# Patient Record
Sex: MALE | Race: WHITE | NOT HISPANIC OR LATINO | Employment: UNEMPLOYED | URBAN - METROPOLITAN AREA
[De-identification: names, ages, dates, MRNs, and addresses within clinical notes are randomized per-mention and may not be internally consistent; named-entity substitution may affect disease eponyms.]

---

## 2018-10-16 ENCOUNTER — HOSPITAL ENCOUNTER (EMERGENCY)
Facility: CLINIC | Age: 38
Discharge: HOME | End: 2018-10-16
Attending: EMERGENCY MEDICINE | Admitting: EMERGENCY MEDICINE
Payer: MEDICAID

## 2018-10-16 VITALS
SYSTOLIC BLOOD PRESSURE: 124 MMHG | BODY MASS INDEX: 27.01 KG/M2 | RESPIRATION RATE: 16 BRPM | HEART RATE: 90 BPM | TEMPERATURE: 97.6 F | WEIGHT: 195 LBS | DIASTOLIC BLOOD PRESSURE: 80 MMHG | OXYGEN SATURATION: 99 %

## 2018-10-16 DIAGNOSIS — F41.9 ANXIETY: ICD-10-CM

## 2018-10-16 PROCEDURE — 93005 ELECTROCARDIOGRAM TRACING: CPT

## 2018-10-16 PROCEDURE — 90791 PSYCH DIAGNOSTIC EVALUATION: CPT

## 2018-10-16 PROCEDURE — 99284 EMERGENCY DEPT VISIT MOD MDM: CPT | Mod: 25 | Performed by: EMERGENCY MEDICINE

## 2018-10-16 PROCEDURE — 93010 ELECTROCARDIOGRAM REPORT: CPT | Mod: Z6 | Performed by: EMERGENCY MEDICINE

## 2018-10-16 PROCEDURE — 99285 EMERGENCY DEPT VISIT HI MDM: CPT | Mod: 25

## 2018-10-16 RX ORDER — ZOLPIDEM TARTRATE 10 MG/1
10 TABLET ORAL
COMMUNITY
Start: 2018-08-28 | End: 2019-04-23

## 2018-10-16 RX ORDER — DULOXETIN HYDROCHLORIDE 20 MG/1
20 CAPSULE, DELAYED RELEASE ORAL 2 TIMES DAILY
COMMUNITY
Start: 2018-06-15 | End: 2019-04-23

## 2018-10-16 RX ORDER — QUETIAPINE FUMARATE 100 MG/1
100 TABLET, FILM COATED ORAL AT BEDTIME
COMMUNITY
Start: 2018-06-15 | End: 2019-04-23

## 2018-10-16 NOTE — ED TRIAGE NOTES
Pt states he's homeless. He's been staying with a friend and they had a falling out now pt states he doesn't have any place to stay. Pt states if he lies still and closes his eyes, his dizziness is gone.

## 2018-10-16 NOTE — ED PROVIDER NOTES
History     Chief Complaint   Patient presents with     Dizziness     headache, dizziness, anxiety. BG 82, NSR,      HPI  Edgar Williamson is a 38 year old male who presents for anxiety and dizziness.  History obtained through the patient, EMS, and review of the chart.  The patient says that he is very anxious, he was staying with a woman he met through Alcoholics Anonymous but she kicked him out today.  He says he took his sleeping medications while he was watching the friend's son and the friend's son could not wake him up and so EMS was called and he was brought to the hospital where he was cleared and discharged.  He says he does not know what to do and that she has some of his things such as his medication.  He says he feels very anxious and this is manifested by racing thoughts and dizziness.  He says he has not taken anything for this today.  He denies any chest pain, difficulty breathing, abdominal pain, fever, chills, headache (he did have a headache earlier today that he describes as a mild but says it is gone now, currently rates his pain is 0 out of 10), nausea, vomiting, diarrhea, dysuria, rash.  EMS report his blood glucose was 82.    Review of his outside records shows he was treated in the emergency department at [facility] last night and stayed all night there for presumed overdose of diazepam and gabapentin.  Per the records from [facility] the patient had a presumed overdose of diazepam and gabapentin as there were allegedly missing bottles of this from this other person's house.  He was watched in the [facility] emergency department for multiple hours and was very sleepy and hard to arouse but did not require any further intervention.. He was discharged this morning in good health.  He denies any of this and says he just took his normal sleeping medication of zolpidem.  At that facility had a normal electrolyte panel, normal LFTs, negative salicylate level, and negative  acetaminophen level.    Problem List:    Patient Active Problem List    Diagnosis Date Noted     Generalized anxiety disorder 09/28/2015     Priority: Medium     Diagnosis updated by automated process. Provider to review and confirm.       Moderate major depression (H) 01/17/2011     Priority: Medium     CARDIOVASCULAR SCREENING; LDL GOAL LESS THAN 160 10/31/2010     Priority: Medium        Past Medical History:    No past medical history on file.    Past Surgical History:    No past surgical history on file.    Family History:    Family History   Problem Relation Age of Onset     Cancer Mother      Alcohol/Drug Father      Psychotic Disorder Father      anxiety     Asthma Maternal Grandmother      Psychotic Disorder Maternal Grandmother      anxiety     HEART DISEASE Maternal Grandfather      Circulatory Maternal Grandfather      Psychotic Disorder Maternal Grandfather      anxiety, depression       Social History:  Marital Status:  Single [1]  Social History   Substance Use Topics     Smoking status: Current Every Day Smoker     Packs/day: 1.00     Types: Cigarettes     Smokeless tobacco: Current User     Types: Chew     Alcohol use No        Medications:      ALPRAZolam (XANAX PO)   DULoxetine (CYMBALTA) 20 MG EC capsule   QUEtiapine (SEROQUEL) 100 MG tablet   zolpidem (AMBIEN) 10 MG tablet         Review of Systems  Pertinent positives and negatives listed in the HPI, all other systems reviewed and are negative.    Physical Exam   BP: 117/80  Pulse: 71  Temp: 97.6  F (36.4  C)  Resp: 16  Weight: 88.5 kg (195 lb)  SpO2: 100 %      Physical Exam   Constitutional: He is oriented to person, place, and time. He appears well-developed and well-nourished. No distress.   HENT:   Head: Normocephalic and atraumatic.   Right Ear: External ear normal.   Left Ear: External ear normal.   Nose: Nose normal.   Eyes: Conjunctivae are normal. No scleral icterus.   Neck: Normal range of motion.   Cardiovascular: Normal rate and  regular rhythm.    Pulmonary/Chest: Effort normal. No stridor. No respiratory distress.   Abdominal: Soft. He exhibits no distension. There is no tenderness. There is no rebound and no guarding.   Neurological: He is alert and oriented to person, place, and time. No cranial nerve deficit. He exhibits normal muscle tone. Coordination and gait normal. GCS eye subscore is 4. GCS verbal subscore is 5. GCS motor subscore is 6.   No dysmetria.  No dysdiadochokinesis.   Skin: Skin is warm and dry. He is not diaphoretic.   Psychiatric: He has a normal mood and affect. His behavior is normal.   Nursing note and vitals reviewed.      ED Course     ED Course     Procedures               EKG Interpretation:      Interpreted by Jose Bernard  Time reviewed:1630   Symptoms at time of EKG: Sleepy   Rhythm: normal sinus   Rate: normal  Axis: NORMAL  Ectopy: none  Conduction: normal  ST Segments/ T Waves: No ST-T wave changes  Q Waves: none  Comparison to prior: No old EKG available    Clinical Impression: normal EKG     Critical Care time:  none               No results found for this or any previous visit (from the past 24 hour(s)).    Medications - No data to display    Assessments & Plan (with Medical Decision Making)   38-year-old male presents for anxiety.  He is very sleepy on examination but is able to answer questions appropriately and stay awake and be conversant.  Temperature is 36.4 C, blood pressure 117/80, heart 71, SPO2 is 100% on room air.  He is complaining of dizziness but he has no subjective findings on examination.  He is able to ambulate without difficulty and has no findings to suggest a central cause of vertigo.  Given the recent workup at [facility], unlikely acetaminophen or illicit overdose here.  No indication for repeat blood work.  EKG is sinus rhythm without prolonged QT, widened QRS, or tall R wave in aVR.  He is watched in the emergency department for several hours and a direct consult was  obtained.  They have helped arrange follow-up with the patient in psychology and with a counselor in 2 days.  On recheck the patient is comfortable and tolerating oral intake.  He has eaten a meal here.  He is asking for refills of his medications and I have offered to refill his duloxetine and quetiapine but he became upset with this asking for the alprazolam and zolpidem which I discussed my concerns with and encouraged him to seek chemical dependency.  He did not agree with this plan.  He was discharged from the hospital, however unfortunately he did not have any place to go or any way to get there.  We offered resources for homeless shelters and the charge nurse spoke on the phone with multiple different shelters in [place] and [place] concerning the patient in trying to arrange for him to have a place to stay tonight.  She was able to arrange for this and transfer of the patient down by taxi.    I have reviewed the nursing notes.    I have reviewed the findings, diagnosis, plan and need for follow up with the patient.       Discharge Medication List as of 10/16/2018  6:41 PM          Final diagnoses:   Anxiety       10/16/2018   [facility] EMERGENCY DEPARTMENT     Jose Bernard MD  10/16/18 8226

## 2018-10-16 NOTE — DISCHARGE INSTRUCTIONS
Follow-up with the psychologist as arranged.  Return to the emergency department if you have fevers, repeated vomiting, or other concerns.  Otherwise follow-up in clinic.

## 2018-10-16 NOTE — ED AVS SNAPSHOT
[facility] Emergency Department    [address] [address]    Phone:  [phone]    Fax:  [phone]                                       Edgar Williamson   MRN: [id number]    Department:  [facility] Emergency Department   Date of Visit:  10/16/2018           Patient Information     Date Of Birth          1980        Your diagnoses for this visit were:     Anxiety        You were seen by Jose Bernard MD.        Discharge Instructions       Follow-up with the psychologist as arranged.  Return to the emergency department if you have fevers, repeated vomiting, or other concerns.  Otherwise follow-up in clinic.    24 Hour Appointment Hotline       To make an appointment at any [facility] call [phone] ([phone]). If you don't have a family doctor or clinic, we will help you find one. [facility] are conveniently located to serve the needs of you and your family.             Review of your medicines      Our records show that you are taking the medicines listed below. If these are incorrect, please call your family doctor or clinic.        Dose / Directions Last dose taken    DULoxetine 20 MG EC capsule   Commonly known as:  CYMBALTA   Dose:  20 mg        Take 20 mg by mouth 2 times daily   Refills:  0        QUEtiapine 100 MG tablet   Commonly known as:  SEROquel   Dose:  100 mg        Take 100 mg by mouth At Bedtime   Refills:  0        XANAX PO   Dose:  0.5 mg        Take 0.5 mg by mouth 3 times daily   Refills:  0        zolpidem 10 MG tablet   Commonly known as:  AMBIEN   Dose:  10 mg        Take 10 mg by mouth nightly as needed   Refills:  0                Procedures and tests performed during your visit     EKG 12 lead      Orders Needing Specimen Collection     None      Pending Results     No orders found from 10/14/2018 to 10/17/2018.            Pending Culture Results     No orders found from 10/14/2018 to 10/17/2018.            Pending  "Results Instructions     If you had any lab results that were not finalized at the time of your Discharge, you can call the ED Lab Result RN at [phone]. You will be contacted by this team for any positive Lab results or changes in treatment. The nurses are available 7 days a week from 10A to 6:30P.  You can leave a message 24 hours per day and they will return your call.        Test Results From Your Hospital Stay               Thank you for choosing [facility]       Thank you for choosing [facility] for your care. Our goal is always to provide you with excellent care. Hearing back from our patients is one way we can continue to improve our services. Please take a few minutes to complete the written survey that you may receive in the mail after you visit with us. Thank you!        [employer] Information     [employer] lets you send messages to your doctor, view your test results, renew your prescriptions, schedule appointments and more. To sign up, go to [email or web address] . Click on \"Log in\" on the left side of the screen, which will take you to the Welcome page. Then click on \"Sign up Now\" on the right side of the page.     You will be asked to enter the access code listed below, as well as some personal information. Please follow the directions to create your username and password.     Your access code is: [email or web address]  Expires: 2019  6:41 PM     Your access code will  in 90 days. If you need help or a new code, please call your [facility] clinic or [phone].        Care EveryWhere ID     This is your Care EveryWhere ID. This could be used by other organizations to access your [facility] medical records  [id number]        Equal Access to Services     PIPE JANG : August Sanches, dennis sadler, padmini hopper. So [facility] [phone].    ATENCIÓN: Si habla español, tiene a martin disposición servicios gratuitos de asistencia " júnior Alvarezdenise al [phone].    We comply with applicable federal civil rights laws and [place] laws. We do not discriminate on the basis of race, color, national origin, age, disability, sex, sexual orientation, or gender identity.            After Visit Summary       This is your record. Keep this with you and show to your community pharmacist(s) and doctor(s) at your next visit.

## 2018-10-16 NOTE — ED AVS SNAPSHOT
[facility] Emergency Department    [address] [address]    Phone:  [phone]    Fax:  [phone]                                       Edgar Williamson   MRN: [id number]    Department:  [facility] Emergency Department   Date of Visit:  10/16/2018           After Visit Summary Signature Page     I have received my discharge instructions, and my questions have been answered. I have discussed any challenges I see with this plan with the nurse or doctor.    ..........................................................................................................................................  Patient/Patient Representative Signature      ..........................................................................................................................................  Patient Representative Print Name and Relationship to Patient    ..................................................               ................................................  Date                                   Time    ..........................................................................................................................................  Reviewed by Signature/Title    ...................................................              ..............................................  Date                                               Time          22EPIC Rev 08/18

## 2018-10-17 ENCOUNTER — HOSPITAL ENCOUNTER (EMERGENCY)
Facility: CLINIC | Age: 38
Discharge: STILL ADMITTED OR ADMINISTRATIVE | End: 2018-10-17
Payer: MEDICAID

## 2018-10-17 NOTE — ED NOTES
Attempted to discharge pt with information to homeless shelters and  chemical dependency info.  Pt refused to sign discharge paperwork and began to yell at staff and pace in room.  Directed pt to waiting room where he cont. To yell and move bags of his belongings to an area of the waiting room.  Pt pacing and agitated.  MD aware of situation.  Security present.  Police called for further assistance.

## 2019-04-23 ENCOUNTER — OFFICE VISIT (OUTPATIENT)
Dept: FAMILY MEDICINE | Facility: CLINIC | Age: 39
End: 2019-04-23
Payer: COMMERCIAL

## 2019-04-23 ENCOUNTER — ANCILLARY PROCEDURE (OUTPATIENT)
Dept: GENERAL RADIOLOGY | Facility: CLINIC | Age: 39
End: 2019-04-23
Attending: FAMILY MEDICINE
Payer: COMMERCIAL

## 2019-04-23 VITALS
HEIGHT: 72 IN | SYSTOLIC BLOOD PRESSURE: 107 MMHG | HEART RATE: 62 BPM | OXYGEN SATURATION: 99 % | WEIGHT: 198 LBS | TEMPERATURE: 97.2 F | BODY MASS INDEX: 26.82 KG/M2 | DIASTOLIC BLOOD PRESSURE: 72 MMHG

## 2019-04-23 DIAGNOSIS — M47.816 PRIMARY OSTEOARTHRITIS OF LUMBAR SPINE: ICD-10-CM

## 2019-04-23 DIAGNOSIS — D32.0 BENIGN MENINGIOMA OF BRAIN (H): ICD-10-CM

## 2019-04-23 DIAGNOSIS — S39.012A STRAIN OF LUMBAR REGION, INITIAL ENCOUNTER: ICD-10-CM

## 2019-04-23 DIAGNOSIS — F41.1 GENERALIZED ANXIETY DISORDER: ICD-10-CM

## 2019-04-23 DIAGNOSIS — Z72.0 TOBACCO ABUSE: ICD-10-CM

## 2019-04-23 DIAGNOSIS — S39.012A STRAIN OF LUMBAR REGION, INITIAL ENCOUNTER: Primary | ICD-10-CM

## 2019-04-23 PROCEDURE — 72100 X-RAY EXAM L-S SPINE 2/3 VWS: CPT

## 2019-04-23 PROCEDURE — 99214 OFFICE O/P EST MOD 30 MIN: CPT | Performed by: FAMILY MEDICINE

## 2019-04-23 RX ORDER — GABAPENTIN 100 MG/1
CAPSULE ORAL
Qty: 60 CAPSULE | Refills: 6 | Status: SHIPPED | OUTPATIENT
Start: 2019-04-23 | End: 2019-05-16 | Stop reason: ALTCHOICE

## 2019-04-23 RX ORDER — DICLOFENAC SODIUM 75 MG/1
75 TABLET, DELAYED RELEASE ORAL 2 TIMES DAILY PRN
Qty: 60 TABLET | Refills: 3 | Status: SHIPPED | OUTPATIENT
Start: 2019-04-23 | End: 2019-11-04 | Stop reason: SINTOL

## 2019-04-23 RX ORDER — ALPRAZOLAM 0.5 MG
1 TABLET ORAL 3 TIMES DAILY PRN
Qty: 30 TABLET | Refills: 0 | COMMUNITY
Start: 2019-04-23 | End: 2020-03-26 | Stop reason: DRUGHIGH

## 2019-04-23 RX ORDER — ZOLPIDEM TARTRATE 10 MG/1
10 TABLET ORAL
Qty: 30 TABLET | Refills: 0 | Status: SHIPPED | OUTPATIENT
Start: 2019-04-23 | End: 2020-03-26

## 2019-04-23 ASSESSMENT — PAIN SCALES - GENERAL: PAINLEVEL: MODERATE PAIN (4)

## 2019-04-23 ASSESSMENT — MIFFLIN-ST. JEOR: SCORE: 1841.12

## 2019-04-23 NOTE — PROGRESS NOTES
SUBJECTIVE:   Edgar Williamson is a 39 year old male who presents to clinic today for the following   health issues:  Patient with history of generalized anxeity disorder, history of insomnia.  He does see psychiatry when he obtained Ambien and Xanax.    He does come with complaints of low back pain which been on and off for years.  He reports he does see a chiropractor  twice a month.  He reported the pain mostly in the lower lumbar region more stiffness.    He denies lower extremity numbness or tingling, denies any weakness.  Denies loss of urine or stool.    Patient reports he does work as a  where he stands and leans forward most of the days.    He reports that over 20 years ago he had x-rays and he been told he had an early sign of arthritis   patient is a smoker he reports he is in the process of quitting.    Back Pain       Duration: Ongoing ( 20 years )        Specific cause: work-related    Description:   Location of pain: low back center  Character of pain: dull ache, stabbing and constant  Pain radiation:none  New numbness or weakness in legs, not attributed to pain:  no     Intensity: Currently 4/10, At its worst 7/10    History:   Pain interferes with job: YES  History of back problems: yes  Any previous MRI or X-rays: None  Sees a specialist for back pain:  chiropractor   Therapies tried without relief: Ibuprofen, cold and heat    Alleviating factors:   Improved by: muscle relaxants      Precipitating factors:  Worsened by: Lying Flat, Coughing and sneezing          Accompanying Signs & Symptoms:  Risk of Fracture:  None  Risk of Cauda Equina:  None  Risk of Infection:  None  Risk of Cancer:  None  Risk of Ankylosing Spondylitis:  Onset at age <35, male, AND morning back stiffness. no       Additional history: as documented    Reviewed  and updated as needed this visit by clinical staff  Tobacco  Allergies  Meds  Med Hx  Surg Hx  Fam Hx  Soc Hx        Reviewed and updated as needed  this visit by Provider         Patient Active Problem List   Diagnosis     CARDIOVASCULAR SCREENING; LDL GOAL LESS THAN 160     Moderate major depression (H)     Generalized anxiety disorder     Benign meningioma of brain (H)     Primary osteoarthritis of lumbar spine     Strain of lumbar region, initial encounter     History reviewed. No pertinent surgical history.    Social History     Tobacco Use     Smoking status: Current Every Day Smoker     Packs/day: 1.00     Types: Cigarettes     Smokeless tobacco: Current User     Types: Chew   Substance Use Topics     Alcohol use: No     Family History   Problem Relation Age of Onset     Cancer Mother      Alcohol/Drug Father      Psychotic Disorder Father         anxiety     Asthma Maternal Grandmother      Psychotic Disorder Maternal Grandmother         anxiety     Heart Disease Maternal Grandfather      Circulatory Maternal Grandfather      Psychotic Disorder Maternal Grandfather         anxiety, depression         Current Outpatient Medications   Medication Sig Dispense Refill     ALPRAZolam (XANAX) 0.5 MG tablet Take 1 tablet (0.5 mg) by mouth 3 times daily 30 tablet 0     diclofenac (VOLTAREN) 75 MG EC tablet Take 1 tablet (75 mg) by mouth 2 times daily as needed for moderate pain 60 tablet 3     gabapentin (NEURONTIN) 100 MG capsule One tab bid as needed 60 capsule 6     zolpidem (AMBIEN) 10 MG tablet Take 1 tablet (10 mg) by mouth nightly as needed 30 tablet 0     Allergies   Allergen Reactions     Diphenhydramine Other (See Comments)     Restless Legs/Feet  Other reaction(s): Restless Legs/Feet  Restless legs  Other reaction(s): Restless Legs/Feet       Methadone Other (See Comments)     Had terrible itching and redness and was pulled off methadone     Mirtazapine Other (See Comments)     Restless Legs/Feet  Other reaction(s): Restless Legs/Feet  Restless legs.  Other reaction(s): Restless Legs/Feet       Seasonal Allergies      Trazodone Other (See Comments) and  Unknown     Behavioral Disturbances  Other reaction(s): Behavioral Disturbances  Behavorial disturbance.  Other reaction(s): Behavioral Disturbances       No lab results found.     ROS:  Constitutional, HEENT, cardiovascular, pulmonary, gi and gu systems are negative, except as otherwise noted.    OBJECTIVE:     /72 (BP Location: Left arm, Patient Position: Chair, Cuff Size: Adult Large)   Pulse 62   Temp 97.2  F (36.2  C) (Oral)   Ht 1.829 m (6')   Wt 89.8 kg (198 lb)   SpO2 99%   BMI 26.85 kg/m    Body mass index is 26.85 kg/m .  GENERAL APPEARANCE: healthy, alert and no distress  RESP: lungs clear to auscultation - no rales, rhonchi or wheezes  CV: regular rates and rhythm  ABDOMEN: soft, nontender, without hepatosplenomegaly or masses and bowel sounds normal  SKIN: no suspicious lesions or rashes  NEURO: Normal strength and tone, mentation intact, DTR symmetrically normal in lower extremities and gait normal including heel/toe/tandem walking  Comprehensive back pain exam:  Tenderness of mid , lower lumbar region, Range of motion not limited by pain, Lower extremity strength functional and equal on both sides, Lower extremity reflexes within normal limits bilaterally, Lower extremity sensation normal and equal on both sides and Straight leg raise negative bilaterally  PSYCH: mentation appears normal    Diagnostic Test Results:  Xray - of Lumbar    ASSESSMENT/PLAN:     1. Strain of lumbar region, initial encounter  Continue with Chiropractor.   Continue active  - XR Lumbar Spine 2/3 Views; Future  - gabapentin (NEURONTIN) 100 MG capsule; One tab bid as needed  Dispense: 60 capsule; Refill: 6  - diclofenac (VOLTAREN) 75 MG EC tablet; Take 1 tablet (75 mg) by mouth 2 times daily as needed for moderate pain  Dispense: 60 tablet; Refill: 3    2. Benign meningioma of brain (H)  See neurology.    3. Generalized anxiety disorder  See Psychiatry.  - ALPRAZolam (XANAX) 0.5 MG tablet; Take 1 tablet (0.5 mg) by  mouth 3 times daily  Dispense: 30 tablet; Refill: 0  - zolpidem (AMBIEN) 10 MG tablet; Take 1 tablet (10 mg) by mouth nightly as needed  Dispense: 30 tablet; Refill: 0    4. Primary osteoarthritis of lumbar spine    - XR Lumbar Spine 2/3 Views; Future  - gabapentin (NEURONTIN) 100 MG capsule; One tab bid as needed  Dispense: 60 capsule; Refill: 6  - diclofenac (VOLTAREN) 75 MG EC tablet; Take 1 tablet (75 mg) by mouth 2 times daily as needed for moderate pain  Dispense: 60 tablet; Refill: 3    5. Tobacco abuse  Advised to quite, pt. Is cutting done  Currently, using Nicotine Lozenge.    Follow up in the next 3- 6 months for complete.    Charlee Hayes MD  [facility]

## 2019-05-14 ENCOUNTER — TELEPHONE (OUTPATIENT)
Dept: FAMILY MEDICINE | Facility: CLINIC | Age: 39
End: 2019-05-14

## 2019-05-14 DIAGNOSIS — M47.816 PRIMARY OSTEOARTHRITIS OF LUMBAR SPINE: Primary | ICD-10-CM

## 2019-05-14 NOTE — TELEPHONE ENCOUNTER
Patient called and stated he would like to increase his gabapentin (NEURONTIN) 100 MG capsule, He stated he was on a higher dose prior. Pharm [id number] - [place], [place] - [address]    Please call to discuss further  Meredith Ward  Team 3 Coordinator

## 2019-05-14 NOTE — TELEPHONE ENCOUNTER
Patient was last seen 4/23/19 by Dr. Hayes, see plan:                Instructions         Return in about 3 months (around 7/23/2019) for Physical Exam.       Attempted to call patient, only number listed is not in service.   Please get good number if he calls back.    Margaret Joshua RN  [facility]

## 2019-05-16 RX ORDER — GABAPENTIN 300 MG/1
300 CAPSULE ORAL 2 TIMES DAILY PRN
Qty: 60 CAPSULE | Refills: 6 | Status: SHIPPED | OUTPATIENT
Start: 2019-05-16 | End: 2019-09-06

## 2019-05-16 NOTE — TELEPHONE ENCOUNTER
Called number provided in second message and voicemail was for a gentleman named Giovanni. Still unable to clarify with patient at this time.     Routed to provider. Please see original message. Per chart review patient was prescribed gabapentin 300 mg capsules 1 cap every night for 1-3 days, then 1 cap BID for 1-3 days, then 1 cap TID in 2015.    Ijeoma Raymond RN

## 2019-05-16 NOTE — TELEPHONE ENCOUNTER
Attempt # 1  Called patient at home number.[phone]  Was call answered?  Answering machine states Giovanni - nurse left message am looking for Edgar Williamson and [phone]     Deepti Carrillo RN  [facility]

## 2019-05-16 NOTE — TELEPHONE ENCOUNTER
I called patient and advised him of new Gabapentin Rx sent.    Patient verbalized understanding of and agreement with plan.    Margaret Joshua RN  [facility]

## 2019-05-17 ENCOUNTER — TELEPHONE (OUTPATIENT)
Dept: FAMILY MEDICINE | Facility: CLINIC | Age: 39
End: 2019-05-17

## 2019-05-17 NOTE — TELEPHONE ENCOUNTER
Reason for Call:  Other prescription    Detailed comments: Patient requests new rx. Pt discussed provider that there may be an increase in dose for gabapentin prescription. We received a script for 100 mg TID. Thank you in advance for taking the time to review this information     Phone Number Patient can be reached at: Other phone number:  [facility] pharmacy     Best Time:     Can we leave a detailed message on this number? Not Applicable    Call taken on 5/17/2019 at 8:58 AM by Pili Marti

## 2019-05-17 NOTE — TELEPHONE ENCOUNTER
I called pharmacy, this is taken care of and patient has picked up the 300 mg dosing prescribed yesterday.    Margaret Joshua RN  [facility]

## 2019-06-11 ENCOUNTER — TELEPHONE (OUTPATIENT)
Dept: FAMILY MEDICINE | Facility: CLINIC | Age: 39
End: 2019-06-11

## 2019-06-11 NOTE — LETTER
[address] [address]  [phone]        June 11, 2019    Regarding:  Edgar Williamson  [address] [place] [id number]              To Whom It May Concern;  Patient is currently being treated for a back position and should not be climbing.  He therefore should not be in a top bunk.            Sincerely,        Charlee Hayes MD

## 2019-07-08 ENCOUNTER — TELEPHONE (OUTPATIENT)
Dept: FAMILY MEDICINE | Facility: CLINIC | Age: 39
End: 2019-07-08

## 2019-07-08 DIAGNOSIS — Z72.0 TOBACCO ABUSE: Primary | ICD-10-CM

## 2019-07-08 RX ORDER — NICOTINE 21 MG/24HR
1 PATCH, TRANSDERMAL 24 HOURS TRANSDERMAL EVERY 24 HOURS
Qty: 14 PATCH | Refills: 0 | Status: SHIPPED | OUTPATIENT
Start: 2019-07-08 | End: 2019-08-01 | Stop reason: SINTOL

## 2019-07-08 NOTE — TELEPHONE ENCOUNTER
Reason for Call:  Other prescription    Detailed comments: Patient is requesting new rx nicotine patches.    Phone Number Patient can be reached at: Other phone number:  [facility] [phone]    Best Time:     Can we leave a detailed message on this number? Not Applicable    Call taken on 7/8/2019 at 8:42 AM by Pili Marti

## 2019-07-08 NOTE — TELEPHONE ENCOUNTER
Called patient at [phone] (home).  The phone number has been disconnected.  Routed to  for letter.    Azra Gonzalez RN [facility] Triage.

## 2019-08-01 ENCOUNTER — OFFICE VISIT (OUTPATIENT)
Dept: FAMILY MEDICINE | Facility: CLINIC | Age: 39
End: 2019-08-01
Payer: COMMERCIAL

## 2019-08-01 VITALS
BODY MASS INDEX: 25.5 KG/M2 | TEMPERATURE: 97.7 F | SYSTOLIC BLOOD PRESSURE: 113 MMHG | DIASTOLIC BLOOD PRESSURE: 67 MMHG | WEIGHT: 188 LBS | HEART RATE: 64 BPM

## 2019-08-01 DIAGNOSIS — F17.200 SMOKER: Primary | ICD-10-CM

## 2019-08-01 DIAGNOSIS — M54.50 CHRONIC MIDLINE LOW BACK PAIN WITHOUT SCIATICA: ICD-10-CM

## 2019-08-01 DIAGNOSIS — G89.29 CHRONIC MIDLINE LOW BACK PAIN WITHOUT SCIATICA: ICD-10-CM

## 2019-08-01 PROCEDURE — 99213 OFFICE O/P EST LOW 20 MIN: CPT | Performed by: FAMILY MEDICINE

## 2019-08-01 ASSESSMENT — ANXIETY QUESTIONNAIRES
6. BECOMING EASILY ANNOYED OR IRRITABLE: MORE THAN HALF THE DAYS
5. BEING SO RESTLESS THAT IT IS HARD TO SIT STILL: MORE THAN HALF THE DAYS
IF YOU CHECKED OFF ANY PROBLEMS ON THIS QUESTIONNAIRE, HOW DIFFICULT HAVE THESE PROBLEMS MADE IT FOR YOU TO DO YOUR WORK, TAKE CARE OF THINGS AT HOME, OR GET ALONG WITH OTHER PEOPLE: VERY DIFFICULT
2. NOT BEING ABLE TO STOP OR CONTROL WORRYING: MORE THAN HALF THE DAYS
1. FEELING NERVOUS, ANXIOUS, OR ON EDGE: MORE THAN HALF THE DAYS
GAD7 TOTAL SCORE: 13
7. FEELING AFRAID AS IF SOMETHING AWFUL MIGHT HAPPEN: SEVERAL DAYS
3. WORRYING TOO MUCH ABOUT DIFFERENT THINGS: MORE THAN HALF THE DAYS

## 2019-08-01 ASSESSMENT — PATIENT HEALTH QUESTIONNAIRE - PHQ9
5. POOR APPETITE OR OVEREATING: MORE THAN HALF THE DAYS
SUM OF ALL RESPONSES TO PHQ QUESTIONS 1-9: 10

## 2019-08-01 NOTE — PROGRESS NOTES
Subjective     Edgar Williamson is a 39 year old male who presents to clinic today for the following health issues:    HPI     Referral to a pain clinic for his back pain    Requesting an Rx for Nicotine Longes    Medication Followup of Voltaren, Gabapentin    Taking Medication as prescribed: yes    Side Effects:  None    Medication Helping Symptoms:  Voltaren - NO                                                         Gabapentin - Yes     Patient used to abuse alcohol   He quit 3.5 years ago     He has aching back for years   He was told he has osteoarthritis       O: /67 (BP Location: Left arm, Patient Position: Sitting, Cuff Size: Adult Regular)   Pulse 64   Temp 97.7  F (36.5  C) (Oral)   Wt 85.3 kg (188 lb)   BMI 25.50 kg/m        I reviewed his xray with him and he does not seem ot have arthritis   He states his pain is around the spine, but he has no pain to percussion over the spine     SLR is normal bilaterally   Gait is normal   Posture is fair   Weight overall is good     Muscle tone seems fair       ICD-10-CM    1. Smoker F17.200 nicotine (NICORETTE) 4 MG lozenge   2. Chronic midline low back pain without sciatica M54.5 PAIN MANAGEMENT REFERRAL    G89.29      I believe his back pain is mostly muscular and feel a good back program would help him the most.  Patient would like to see if the pain clinic can offer him alternatives to taking pain medications

## 2019-08-02 ENCOUNTER — TELEPHONE (OUTPATIENT)
Dept: PALLIATIVE MEDICINE | Facility: CLINIC | Age: 39
End: 2019-08-02

## 2019-08-02 ASSESSMENT — ANXIETY QUESTIONNAIRES: GAD7 TOTAL SCORE: 13

## 2019-08-02 NOTE — TELEPHONE ENCOUNTER
Phoned pt to schedule New Eval, number disconnected, sent letter.      Dorothy EASON    [facility]

## 2019-08-02 NOTE — LETTER
August 2, 2019    Edgar Williamson  [address] [address]             You have been referred to [facility]. We have been unable to contact you by telephone to schedule your appointment. Please call our clinic at [phone] to schedule this appointment.       Sincerely,        [facility]

## 2019-09-05 ENCOUNTER — TELEPHONE (OUTPATIENT)
Dept: FAMILY MEDICINE | Facility: CLINIC | Age: 39
End: 2019-09-05

## 2019-09-05 DIAGNOSIS — M47.816 PRIMARY OSTEOARTHRITIS OF LUMBAR SPINE: ICD-10-CM

## 2019-09-05 DIAGNOSIS — F17.200 SMOKER: ICD-10-CM

## 2019-09-05 NOTE — TELEPHONE ENCOUNTER
He should have refills available at [address] for the gabapentin, appears the nicotine gum was sent to [facility] pharmacy.    I called [facility] who reports the gabapentin Rx is at the [address] [facility].   I will need contact that pharmacy to discuss patient possibly getting early refill of gabapentin.    On hold over 5 minutes.  Other calls to be done so gave up at 6 1/2 minutes, will try again later.  Margaret Joshua RN  [facility]

## 2019-09-05 NOTE — TELEPHONE ENCOUNTER
"I called [facility] again.    On hold for 8 minutes while I worked on refills.    Gave up.   Will try again later.    I also called the  pharmacy, they have refills of the nicotine lozenges which could be filled now (it \"ran\" through insurance okay) or could be transferred out.    Attempted to call patient at home number, left message on voicemail; patient was instructed to return call to [facility] RN directly on the RN call back line at [phone]     Plan to advise him to call [facility] [address] [facility] or have [address] transfer his gabapentin Rx back and let him know about the nicotine lozenges.  Margaret Joshua, RN  [facility]          "
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
## 2019-09-06 RX ORDER — GABAPENTIN 300 MG/1
300 CAPSULE ORAL 2 TIMES DAILY PRN
Qty: 60 CAPSULE | Refills: 6 | Status: SHIPPED | OUTPATIENT
Start: 2019-09-06 | End: 2019-10-15

## 2019-09-06 NOTE — TELEPHONE ENCOUNTER
I have signed the orders for his refills of gabapentin and nicotine lozenges.     (Appears on  the patient is  his medications once a month regularly.  Does not look like he is getting them from any other sources and that it consistently has been refilled at that time frame without getting them early is normal.)

## 2019-09-06 NOTE — TELEPHONE ENCOUNTER
Routed to PCP to see if OK for early refills.    See message below regarding med and police.    Azra Gonzalez RN [facility] Triage.

## 2019-10-03 PROBLEM — R41.82 ALTERED MENTAL STATUS, UNSPECIFIED: Status: ACTIVE | Noted: 2017-03-02

## 2019-10-03 PROBLEM — I87.2 STASIS DERMATITIS OF BOTH LEGS: Status: ACTIVE | Noted: 2017-12-22

## 2019-10-03 PROBLEM — S01.111A: Status: ACTIVE | Noted: 2017-03-02

## 2019-10-03 PROBLEM — H02.052 TRICHIASIS OF RIGHT LOWER EYELID: Status: ACTIVE | Noted: 2017-08-03

## 2019-10-03 PROBLEM — F10.929 ALCOHOL INTOXICATION, WITH UNSPECIFIED COMPLICATION (H): Status: ACTIVE | Noted: 2017-03-02

## 2019-10-15 ENCOUNTER — TELEPHONE (OUTPATIENT)
Dept: FAMILY MEDICINE | Facility: CLINIC | Age: 39
End: 2019-10-15

## 2019-10-15 DIAGNOSIS — M47.816 PRIMARY OSTEOARTHRITIS OF LUMBAR SPINE: ICD-10-CM

## 2019-10-15 RX ORDER — GABAPENTIN 300 MG/1
300 CAPSULE ORAL 3 TIMES DAILY
Qty: 90 CAPSULE | Refills: 0 | Status: SHIPPED | OUTPATIENT
Start: 2019-10-15 | End: 2019-11-12

## 2019-10-15 NOTE — TELEPHONE ENCOUNTER
Called patient.  He is currently taking Gabapentin 300 mg BID.  He stated that his back pain is still a 6/10 and is wondering if he can increase his dosage.    Routed to provider to advise.  Azra Gonzalez RN,BSN  [facility]

## 2019-10-15 NOTE — TELEPHONE ENCOUNTER
Reason for Call:  Other / Med question    Detailed comments: Patient called and stated he would like to receive a response as soon as possible.    Phone Number Patient can be reached at: Home number on file [phone] (home)    Best Time: ASAP    Can we leave a detailed message on this number? YES    Call taken on 10/15/2019 at 3:53 PM by Xena Linder

## 2019-10-15 NOTE — TELEPHONE ENCOUNTER
Reason for Call:  Other prescription    Detailed comments: Patient would like to discuss gabapentin and possibly increasing the medication. [facility]      Phone Number Patient can be reached at: Home number on file [phone] (home)    Best Time:     Can we leave a detailed message on this number? YES    Call taken on 10/15/2019 at 11:13 AM by Pili Marti

## 2019-10-15 NOTE — TELEPHONE ENCOUNTER
Patient never followed through on the pain referral and he needs to contact them.    I will increase his gabapentin to tid   No refills beyond this without a follow up scheduled  site checked and appropriate

## 2019-10-15 NOTE — TELEPHONE ENCOUNTER
Notified patient of the message below and faxed script to pharmacy.  Azra Gonzalez RN,BSN  [facility]

## 2019-10-16 NOTE — TELEPHONE ENCOUNTER
Script was printed yesterday  Routed to TC to please resend.    Azra Gonzalez RN,BSN  [facility]

## 2019-10-16 NOTE — TELEPHONE ENCOUNTER
Patient is at pharmacy. He states pharmacy did not receive script. Patient states he asked that script be sent to [facility] at [address] [place] [address]. Patient very upset and disconnected call.    Thank you,  Deanna TANG  [facility]  Team Shira Coordinator

## 2019-11-04 ENCOUNTER — OFFICE VISIT (OUTPATIENT)
Dept: PALLIATIVE MEDICINE | Facility: CLINIC | Age: 39
End: 2019-11-04
Payer: COMMERCIAL

## 2019-11-04 VITALS
SYSTOLIC BLOOD PRESSURE: 129 MMHG | BODY MASS INDEX: 25.23 KG/M2 | DIASTOLIC BLOOD PRESSURE: 82 MMHG | WEIGHT: 186 LBS | HEART RATE: 98 BPM | OXYGEN SATURATION: 98 %

## 2019-11-04 DIAGNOSIS — R46.89 VERBALLY ABUSIVE BEHAVIOR: ICD-10-CM

## 2019-11-04 DIAGNOSIS — Z91.89 HISTORY OF DRUG OVERDOSE: ICD-10-CM

## 2019-11-04 DIAGNOSIS — G89.29 CHRONIC BILATERAL LOW BACK PAIN WITHOUT SCIATICA: Primary | ICD-10-CM

## 2019-11-04 DIAGNOSIS — F19.10 POLYSUBSTANCE ABUSE (H): ICD-10-CM

## 2019-11-04 DIAGNOSIS — M54.50 CHRONIC BILATERAL LOW BACK PAIN WITHOUT SCIATICA: Primary | ICD-10-CM

## 2019-11-04 PROCEDURE — 99207 ZZC DOWN CODE DUE TO SUBSEQUENT EXAM: CPT | Performed by: NURSE PRACTITIONER

## 2019-11-04 PROCEDURE — 99214 OFFICE O/P EST MOD 30 MIN: CPT | Performed by: NURSE PRACTITIONER

## 2019-11-04 ASSESSMENT — PAIN SCALES - GENERAL: PAINLEVEL: SEVERE PAIN (6)

## 2019-11-04 NOTE — Clinical Note
Dr Jose, Please see my note regarding the patient's behavior in clinic.  I would recommend referral to addiction medicine if he continues to seek pain treatment.DARELL Darden, NP-[facility]
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
## 2019-11-12 DIAGNOSIS — M47.816 PRIMARY OSTEOARTHRITIS OF LUMBAR SPINE: ICD-10-CM

## 2019-11-12 RX ORDER — GABAPENTIN 300 MG/1
300 CAPSULE ORAL 3 TIMES DAILY
Qty: 90 CAPSULE | Refills: 0 | Status: SHIPPED | OUTPATIENT
Start: 2019-11-12 | End: 2019-11-21

## 2019-11-12 NOTE — TELEPHONE ENCOUNTER
Reason for Call:  Medication or medication refill:    Do you use a [facility]?  Name of the pharmacy and phone number for the current request:  [id number] - [place], [place] - [address]    Name of the medication requested: gabapentin (NEURONTIN) 300 MG capsule    Other request: patient states that he was referred to the pain clinic for refills of his gabapentin. He had that appointment and said it was a really bad experience to the point where he ended up walking out of the appointment. He is going to call his insurance to find out what other pain clinic he can go to that is outside of [place], but he is hoping that in the meantime Dr. Hayes can refill this medication for him.    Can we leave a detailed message on this number? YES    Phone number patient can be reached at: Home number on file [phone] (home)    Best Time: anytime    Call taken on 11/12/2019 at 9:06 AM by Anuj Lopez

## 2019-11-21 ENCOUNTER — OFFICE VISIT (OUTPATIENT)
Dept: FAMILY MEDICINE | Facility: CLINIC | Age: 39
End: 2019-11-21
Payer: COMMERCIAL

## 2019-11-21 ENCOUNTER — TELEPHONE (OUTPATIENT)
Dept: ADDICTION MEDICINE | Facility: CLINIC | Age: 39
End: 2019-11-21

## 2019-11-21 VITALS
TEMPERATURE: 97.6 F | BODY MASS INDEX: 24.95 KG/M2 | DIASTOLIC BLOOD PRESSURE: 66 MMHG | SYSTOLIC BLOOD PRESSURE: 107 MMHG | OXYGEN SATURATION: 98 % | HEART RATE: 96 BPM | WEIGHT: 184 LBS

## 2019-11-21 DIAGNOSIS — M47.816 PRIMARY OSTEOARTHRITIS OF LUMBAR SPINE: ICD-10-CM

## 2019-11-21 DIAGNOSIS — F32.1 MODERATE MAJOR DEPRESSION (H): Primary | ICD-10-CM

## 2019-11-21 PROCEDURE — 99213 OFFICE O/P EST LOW 20 MIN: CPT | Performed by: FAMILY MEDICINE

## 2019-11-21 RX ORDER — IBUPROFEN 800 MG/1
800 TABLET, FILM COATED ORAL EVERY 8 HOURS PRN
Qty: 180 TABLET | Refills: 6 | Status: SHIPPED | OUTPATIENT
Start: 2019-11-21 | End: 2020-12-24

## 2019-11-21 RX ORDER — GABAPENTIN 300 MG/1
300 CAPSULE ORAL 3 TIMES DAILY
Qty: 180 CAPSULE | Refills: 3 | Status: SHIPPED | OUTPATIENT
Start: 2019-11-21 | End: 2020-04-29

## 2019-11-21 RX ORDER — IBUPROFEN 200 MG
600 TABLET ORAL
COMMUNITY
Start: 2019-09-18 | End: 2019-12-23

## 2019-11-21 RX ORDER — IBUPROFEN 600 MG/1
TABLET, FILM COATED ORAL
Refills: 0 | COMMUNITY
Start: 2019-08-02 | End: 2019-12-23

## 2019-11-21 RX ORDER — IBUPROFEN 600 MG/1
TABLET, FILM COATED ORAL
Refills: 0 | Status: CANCELLED | OUTPATIENT
Start: 2019-11-21

## 2019-11-21 ASSESSMENT — PAIN SCALES - GENERAL: PAINLEVEL: SEVERE PAIN (6)

## 2019-11-21 NOTE — PROGRESS NOTES
Subjective     Edgar Williamson is a 39 year old male who presents to clinic today for the following health issues:    HPI   Chronic/Recurring Back Pain Follow Up  Patient does have chronic low back pain, secondary to lumbar osteoarthritis.  He has been having this for many years now.  His back gets very stiff especially when he stands or tries to walk.  He reports pain worse when he stands or walk.  He reports in the past he had to quit works because of his back pain and stiffness in his back his back.  He is on gabapentin, taking ibuprofen as needed.    In the past she was seen by pain management however his visit did not go well.    He reports sometimes get numbness and tingling in his lower extremity.      Where is your back pain located? (Select all that apply) low back middle    How would you describe your back pain?  dull ache and sharp    Where does your back pain spread? nowhere    Since your last clinic visit for back pain, how has your pain changed? unchanged    Does your back pain interfere with your job? YES    Since your last visit, have you tried any new treatment? No      How many servings of fruits and vegetables do you eat daily?  2-3    On average, how many sweetened beverages do you drink each day (soda, juice, sweet tea, etc)?   5  How many days per week do you miss taking your medication? 1    What makes it hard for you to take your medications?  remembering to take      Patient Active Problem List   Diagnosis     CARDIOVASCULAR SCREENING; LDL GOAL LESS THAN 160     Moderate major depression (H)     Generalized anxiety disorder     Benign meningioma of brain (H)     Primary osteoarthritis of lumbar spine     Strain of lumbar region, initial encounter     Trichiasis of right lower eyelid     Stasis dermatitis of both legs     Seasonal allergies     Poisoning by benzodiazepines, undetermined, initial encounter     Opioid abuse, in remission (H)     Nodule of right lung     Lower urinary tract  infectious disease     Leukocytosis     Kidney stone     Insomnia     Homeless single person     History of heroin abuse (H)     Eyelid laceration, right, initial encounter     Altered mental status, unspecified     Alcohol intoxication, with unspecified complication (H)     Alcohol abuse     Controlled substance agreement signed     History reviewed. No pertinent surgical history.    Social History     Tobacco Use     Smoking status: Current Every Day Smoker     Packs/day: 1.00     Types: Cigarettes     Smokeless tobacco: Current User     Types: Chew   Substance Use Topics     Alcohol use: No     Family History   Problem Relation Age of Onset     Cancer Mother      Alcohol/Drug Father      Psychotic Disorder Father         anxiety     Asthma Maternal Grandmother      Psychotic Disorder Maternal Grandmother         anxiety     Heart Disease Maternal Grandfather      Circulatory Maternal Grandfather      Psychotic Disorder Maternal Grandfather         anxiety, depression         Current Outpatient Medications   Medication Sig Dispense Refill     ALPRAZolam (XANAX) 0.5 MG tablet Take 1 tablet (0.5 mg) by mouth 3 times daily 30 tablet 0     gabapentin (NEURONTIN) 300 MG capsule Take 1 capsule (300 mg) by mouth 3 times daily 180 capsule 3     ibuprofen (ADVIL/MOTRIN) 200 MG tablet Take 600 mg by mouth       ibuprofen (ADVIL/MOTRIN) 600 MG tablet TAKE 1 TABLET (600 MG) BY MOUTH EVERY SIX HOURS AS NEEDED FOR PAIN.  0     ibuprofen (ADVIL/MOTRIN) 800 MG tablet Take 1 tablet (800 mg) by mouth every 8 hours as needed for moderate pain 180 tablet 6     nicotine (NICORETTE) 4 MG lozenge Place 1 lozenge (4 mg) inside cheek as needed for smoking cessation 168 lozenge 3     zolpidem (AMBIEN) 10 MG tablet Take 1 tablet (10 mg) by mouth nightly as needed 30 tablet 0     Allergies   Allergen Reactions     Diphenhydramine Other (See Comments)     Restless Legs/Feet  Other reaction(s): Restless Legs/Feet  Restless legs  Other  reaction(s): Restless Legs/Feet       Methadone Other (See Comments)     Had terrible itching and redness and was pulled off methadone     Mirtazapine Other (See Comments)     Restless Legs/Feet  Other reaction(s): Restless Legs/Feet  Restless legs.  Other reaction(s): Restless Legs/Feet       Seasonal Allergies      Trazodone Other (See Comments) and Unknown     Behavioral Disturbances  Other reaction(s): Behavioral Disturbances  Behavorial disturbance.  Other reaction(s): Behavioral Disturbances       No lab results found.     Reviewed and updated as needed this visit by Provider         Review of Systems   ROS COMP: Constitutional, HEENT, cardiovascular, pulmonary, gi and gu systems are negative, except as otherwise noted.      Objective    There were no vitals taken for this visit.  There is no height or weight on file to calculate BMI.  Physical Exam   GENERAL: healthy, alert and no distress  MS: no gross musculoskeletal defects noted, no edema  Comprehensive back pain exam:  Tenderness of lower lumbar area, mid lumbar, Range of motion not limited by pain, Lower extremity strength functional and equal on both sides, Lower extremity reflexes within normal limits bilaterally, Lower extremity sensation normal and equal on both sides and Straight leg raise negative bilaterally  PSYCH: mentation appears normal, affect normal/bright    Diagnostic Test Results:  Labs reviewed in Epic        Assessment & Plan       ICD-10-CM    1. Moderate major depression (H) F32.1    2. Primary osteoarthritis of lumbar spine M47.816 ADDICTION MEDICINE REFERRAL     gabapentin (NEURONTIN) 300 MG capsule     ibuprofen (ADVIL/MOTRIN) 800 MG tablet   I did renew his prescription.  He is to continue with gabapentin, ibuprofen as needed every 8 hours.  In addition, he was referred to addiction medicine for possible to start on Suboxone.     Tobacco Cessation:   reports that he has been smoking cigarettes. He has been smoking about 1.00 pack  per day. His smokeless tobacco use includes chew.  Tobacco Cessation Action Plan: Self help information given to patient        There are no Patient Instructions on file for this visit.    Return in about 6 months (around 5/21/2020) for Physical Exam.    Charlee Hayes MD  [facility]

## 2019-11-25 NOTE — TELEPHONE ENCOUNTER
Please schedule appointment for patient with   Addiction Medicine Provider   For possible suboxone

## 2019-11-25 NOTE — TELEPHONE ENCOUNTER
Pt is scheduled with Linda Benoit on 11/29/19 @ 9:30 @ [facility] . Closing encounter as no further follow up is needed.     Anna Calderon  [facility]

## 2019-12-13 ENCOUNTER — OFFICE VISIT (OUTPATIENT)
Dept: ADDICTION MEDICINE | Facility: CLINIC | Age: 39
End: 2019-12-13
Payer: COMMERCIAL

## 2019-12-13 VITALS
TEMPERATURE: 97.9 F | WEIGHT: 196 LBS | OXYGEN SATURATION: 100 % | RESPIRATION RATE: 14 BRPM | BODY MASS INDEX: 26.58 KG/M2 | HEART RATE: 80 BPM

## 2019-12-13 DIAGNOSIS — G47.09 INITIAL INSOMNIA: ICD-10-CM

## 2019-12-13 DIAGNOSIS — F10.21 ALCOHOL USE DISORDER, SEVERE, IN SUSTAINED REMISSION (H): ICD-10-CM

## 2019-12-13 DIAGNOSIS — F17.200 TOBACCO USE DISORDER: ICD-10-CM

## 2019-12-13 DIAGNOSIS — F11.20 OPIOID USE DISORDER, SEVERE, DEPENDENCE (H): Primary | ICD-10-CM

## 2019-12-13 DIAGNOSIS — Z79.899 HIGH RISK MEDICATION USE: ICD-10-CM

## 2019-12-13 DIAGNOSIS — F41.1 GENERALIZED ANXIETY DISORDER: ICD-10-CM

## 2019-12-13 DIAGNOSIS — F32.1 MODERATE MAJOR DEPRESSION (H): ICD-10-CM

## 2019-12-13 LAB
AMPHETAMINES UR QL: ABNORMAL NG/ML
BARBITURATES UR QL SCN: NOT DETECTED NG/ML
BENZODIAZ UR QL SCN: NOT DETECTED NG/ML
BUPRENORPHINE UR QL: NOT DETECTED NG/ML
CANNABINOIDS UR QL: NOT DETECTED NG/ML
COCAINE UR QL SCN: NOT DETECTED NG/ML
D-METHAMPHET UR QL: ABNORMAL NG/ML
METHADONE UR QL SCN: NOT DETECTED NG/ML
OPIATES UR QL SCN: NOT DETECTED NG/ML
OXYCODONE UR QL SCN: NOT DETECTED NG/ML
PCP UR QL SCN: NOT DETECTED NG/ML
PROPOXYPH UR QL: NOT DETECTED NG/ML
TRICYCLICS UR QL SCN: NOT DETECTED NG/ML

## 2019-12-13 PROCEDURE — 80306 DRUG TEST PRSMV INSTRMNT: CPT | Performed by: NURSE PRACTITIONER

## 2019-12-13 PROCEDURE — 99205 OFFICE O/P NEW HI 60 MIN: CPT | Performed by: NURSE PRACTITIONER

## 2019-12-13 RX ORDER — BUPRENORPHINE AND NALOXONE 4; 1 MG/1; MG/1
FILM, SOLUBLE BUCCAL; SUBLINGUAL
Qty: 4 FILM | Refills: 0 | Status: SHIPPED | OUTPATIENT
Start: 2019-12-13 | End: 2019-12-23

## 2019-12-13 RX ORDER — ESCITALOPRAM OXALATE 20 MG/1
30 TABLET ORAL DAILY
COMMUNITY
End: 2020-06-12

## 2019-12-13 NOTE — PROGRESS NOTES
"  SUBJECTIVE                                                    CC: Patient presents with:  \"want to start suboxone\"      Primary care provider: Charlee Hayes MD   Psychiatric provider or therapist: [facility] and psychiatry services provided by Aurea Rodrigues CNP - [facility].     [place] Board of Pharmacy Data Base Reviewed:    Yes; Reviewed today and no concerns for diversionary activity.  Most recent data includes:  12/06/2019 Gabapentin 300 Mg Capsule  #90.00  30 Dr. Hayes  12/03/2019 Alprazolam 1 Mg Tablet  #90.00  30 Aurea Rodrigues CNP  11/25/2019 Zolpidem Tartrate 10 Mg Tablet  #30.00 30 Aurea Rodrigues CNP  11/14/2019 Gabapentin 300 Mg Capsule  #90.00  30 Dr. Hayes  11/04/2019 Alprazolam 1 Mg Tablet  #90.00  30 Aurea Rodrigues CNP  10/28/2019 Zolpidem Tartrate 10 Mg Tablet  #30.00 30 Aurea Rodrigues CNP  10/16/2019 Gabapentin 300 Mg Capsule  #90.00  30 Dr. Knight  10/07/2019 Alprazolam 1 Mg Tablet  #90.00  30 Aurea Rodrigues CNP  10/01/2019 Zolpidem Tartrate 10 Mg Tablet  #30.00 30 Aurea Rodrigues CNP    HPI:    Edgar Williamson is a 39 year old male with a history of opioid use disorder, alcohol use disorder, major depression, generalized anxiety disorder, initial insomnia, chronic neck and low back pain secondary to osteoarthritis of lumbar spine who presents for initial visit for addiction consultation and management referred by Dr. Hayes.  Has been seen in pain management with Dr. Verma and Cristina Carwtright CNP before.  Takes gabapentin 300mg TID for pain and Ibuprofen.  Patient reports that PT made pain worse in the past but pain reduce by regular chiropractic visits.      He reports alcohol use in past was significant for failed attempts to cut down, cravings, drinking more than intended for longer than intended, drinking in high risk environments, continued use despite persistent marital problems, tolerance and " "withdrawal symptoms requiring hospitalization in 2015.  He has not drank in 5 years and attended treatment at [facility] in 2015 after detox at [facility].  Denies withdrawal seizures but reports significant tremors, sweats, and delirium tremens while at [facility].  He denies cravings for alcohol currently but in the past reports that he used opioid pain pills to stop drinking after his wife gave him an ultimatum when she was pregnant.  Patient reports he was able to stop drinking when he was taking pain pills in 2012 but as soon as he would stop using opioids he would crave alcohol.      Started abusing pain pills off the street in 2011 to reduce alcohol use and manage chronic back pain.  At peak use was taking up to 60 mg oxyContin daily for about a year with one episode of trying heroin (smoking) then started suboxone in 2012 through [facility].  Denies history of IVDU.  He was on suboxone maintenance 16-4mg daily for 4 months then stopped taking after he was prescribed opioids for kidney stone pain and had a return of alcohol cravings/drinking once the prescription pain medications ran out.  He reports taking more than intended for longer than intended, failed attempts to reduce use, significant time spent obtaining and recovering from opioids, relationships issues with continued use, tolerance, and withdrawal.  Returned to problematic alcohol use daily until treatment in 11/2015 at [facility] after detox stay.  He denies current cravings for alcohol and opioids but states that his chronic back pain makes it difficult for him to function and he doesn't want to return to illicit drug or alcohol use to manage pain.  He reports his back pain at best is 6/10 and at its worst is 9/10.  Movement helps to reduce the pain and sleep is problematic due to pain at night and difficulty initiating sleep due to pain and anxiety.      He reports historical hallucinogen and cocaine use in 2009 and earlier.  \"My wife and I used " "to party quite a bit before we did the family thing.\"  UDS reviewed and patient denies methamphetamine or amphetamine use.  He reports taking unknown cold/flu medications last evening.  Discussed routine UDS and that we will recheck at next visit and send confirmation test at that time if still positive.      Patient seen regularly by his psychiatric nurse practitioner - Aurea Vasquez who manages his Lexapro 30 mg daily (started 7 months ago), Alprazolam 1 mg TID prn (not present in UDS - states he takes 2-3 times daily), Zolpidem 10 mg at HS (states he wants to get off this which was encouraged).  Patient was notified of increased risk of overdose taking Suboxone with the above medications and thus lower suboxone doses are warranted until he is able to reduce benzodiazepine and ambien use.      CD History:     SUBSTANCE(S)  OF CHOICE - alcohol       Date of last use - 11/02/2015  History of use  - See HPI  Withdrawal symptoms - Delirium tremens in 2012 while hospitalized at [facility]   IV drug use - denies  Previous MAT (Suboxone/Vivitrol/Methadone) - Suboxone maintenance through [facility] for 4 months; was taking 16-4mg daily.   Previous detox/treatment - treatment in 2015 at [facility]   Longest period of sobriety - 4 years   Medical complications from substance use (ie. infection, organ damage, seizures) - denies, other than DTs   History of overdose - denies  Narcan currently available - none on hand, ordered today and instructed to keep with him     Other Substance Use:  ALCOHOL - sober since 11/2015  OPIATES   - Heroin tired once in 2011, peak use was using 60 mg OxyContin daily withdrawal symptoms for about three days and then started suboxone and last use was 2011 illicitly. Last prescribed in 2012.   BENZODIAZEPINES - no history of overuse   AMPHETAMINES/METHAMPHETAMINES - none  MARIJUANA  - None  COCAINE - last use 2009, occasional use  HALLUCINOGENS  - LSD and mushrooms occasional, and last use as a " "teenager  ANABOLIC STEROIDS - denies   OTHER  (Gambling, shopping) - denies    NICOTINE-cigarettes 5 daily and then e-cig throughout the day   Desire to quit - trying to quit cigarettes   Previous quit attempts - quit for 20 days at age 19   Barriers to quitting - stress, withdrawal symptoms (insomnia, anxiety)  Hx of medications for tobacco cessation - uses nicotine lozenges through PCP    Hepatitis C Status    Hepatitis C antibody test done 08/02/2019- Nonreactive ([facility])     PAST PSYCHIATRIC HISTORY    Past diagnoses - Major depressive disorder, generalized anxiety disorder, initial insomnia  Hospitalizations/commitment - denies  Suicide Attempts - denies  Psychotherapy/ECT/TMS - yes currently individual therapy walk-in clinic   Medication trials - mirtazapine - restlessness; trazodone - restless legs    Per note from Cristina Cartwright CNP (pain clinic) note dated 11/04/2019 -  \"Psychiatric:  mentation appears normal., affect and mood normal, became confrontational and verbally aggressive when told he was not a candidate for opiate prescribing.\"     MEDICAL HISTORY:  Problem list, allergies, and histories reviewed & adjusted, as indicated.  Additional history: as documented     Patient Active Problem List    Diagnosis Date Noted     Primary osteoarthritis of lumbar spine 04/23/2019     Priority: Medium     Strain of lumbar region, initial encounter 04/23/2019     Priority: Medium     Benign meningioma of brain (H)      Priority: Medium     Stasis dermatitis of both legs 12/22/2017     Priority: Medium     Trichiasis of right lower eyelid 08/03/2017     Priority: Medium     Eyelid laceration, right, initial encounter 03/02/2017     Priority: Medium     Altered mental status, unspecified 03/02/2017     Priority: Medium     Alcohol intoxication, with unspecified complication (H) 03/02/2017     Priority: Medium     Homeless single person 06/20/2016     Priority: Medium     Poisoning by benzodiazepines, " undetermined, initial encounter 02/22/2016     Priority: Medium     Generalized anxiety disorder 09/28/2015     Priority: Medium     Diagnosis updated by automated process. Provider to review and confirm.       Alcohol abuse 09/14/2015     Priority: Medium     Lower urinary tract infectious disease 11/27/2014     Priority: Medium     Leukocytosis 11/27/2014     Priority: Medium     Nodule of right lung 11/26/2014     Priority: Medium     Overview:   Repeat CT chest recommended in 6 months, 12 months and 24 months       Kidney stone 11/26/2014     Priority: Medium     Opioid abuse, in remission (H) 03/07/2014     Priority: Medium     History of heroin abuse (H) 03/07/2014     Priority: Medium     Controlled substance agreement signed 08/14/2013     Priority: Medium     Overview:   Was getting alprazolam from an outside provider. Will not prescribe controlled substances for pt.        Seasonal allergies 03/12/2013     Priority: Medium     Insomnia 03/12/2013     Priority: Medium     Moderate major depression (H) 01/17/2011     Priority: Medium     CARDIOVASCULAR SCREENING; LDL GOAL LESS THAN 160 10/31/2010     Priority: Medium       Past Medical History:   Diagnosis Date     Anxiety      Benign meningioma of brain (H)      Depressive disorder      Moderate major depression (H) 1/17/2011     Primary osteoarthritis of lumbar spine 4/23/2019       No past surgical history on file.      Family History   Problem Relation Age of Onset     Cancer Mother      Alcohol/Drug Father      Psychotic Disorder Father         anxiety     Asthma Maternal Grandmother      Psychotic Disorder Maternal Grandmother         anxiety     Heart Disease Maternal Grandfather      Circulatory Maternal Grandfather      Psychotic Disorder Maternal Grandfather         anxiety, depression       Social History     Social History Narrative    Updated : 12/13/2019    Living Situation:  Rents an apartment in [place], lives alone or with 10 yo son when he  visits    Marital status:     Children: two children, shared custody and pays child support    Employment/financial status: currently not working due to pain - working on GED and may return to work as  once pain is stable     Current recovery program (meetings, sponsorship): None currently         Exercise/activity: Walks frequently, no regular exercise plan    Diet/food security: Stable, able to access food    Transportation: Own vehicle        Legal history: Denies       ALLERGIES & CURRENT MEDICATIONS:  Allergies   Allergen Reactions     Diphenhydramine Other (See Comments)     Restless Legs/Feet  Other reaction(s): Restless Legs/Feet  Restless legs  Other reaction(s): Restless Legs/Feet       Methadone Other (See Comments)     Had terrible itching and redness and was pulled off methadone     Mirtazapine Other (See Comments)     Restless Legs/Feet  Other reaction(s): Restless Legs/Feet  Restless legs.  Other reaction(s): Restless Legs/Feet       Seasonal Allergies      Trazodone Other (See Comments) and Unknown     Behavioral Disturbances  Other reaction(s): Behavioral Disturbances  Behavorial disturbance.  Other reaction(s): Behavioral Disturbances         Current Outpatient Medications   Medication Sig Dispense Refill     ALPRAZolam (XANAX) 0.5 MG tablet Take 1 mg by mouth 3 times daily as needed  30 tablet 0     escitalopram (LEXAPRO) 20 MG tablet Take 30 mg by mouth daily       gabapentin (NEURONTIN) 300 MG capsule Take 1 capsule (300 mg) by mouth 3 times daily 180 capsule 3     ibuprofen (ADVIL/MOTRIN) 200 MG tablet Take 600 mg by mouth       ibuprofen (ADVIL/MOTRIN) 600 MG tablet TAKE 1 TABLET (600 MG) BY MOUTH EVERY SIX HOURS AS NEEDED FOR PAIN.  0     ibuprofen (ADVIL/MOTRIN) 800 MG tablet Take 1 tablet (800 mg) by mouth every 8 hours as needed for moderate pain 180 tablet 6     naloxone (NARCAN) 1 mg/mL for intranasal kit (2 syringes with 2 mucosal atomizer device) In opioid overdose put  cone in nostril and push 1/2 of contents into each nostril.  Repeat every 3 min if no response until help arrives. 2 Syringe 3     nicotine (NICORETTE) 4 MG lozenge Place 1 lozenge (4 mg) inside cheek as needed for smoking cessation 168 lozenge 3     zolpidem (AMBIEN) 10 MG tablet Take 1 tablet (10 mg) by mouth nightly as needed 30 tablet 0       REVIEW OF SYSTEMS:  General: Denies acute withdrawal symptoms.  No recent infections or fever  Eyes:  No vision concerns.  No double vision.    Resp: No coughing, wheezing or shortness of breath  CV: No chest pains or palpitations  GI: No nausea, vomiting, abdominal pain, diarrhea.  No constipation  : No urinary frequency or dysuria    Musculoskeletal: No significant muscle or joint pains other than as above.  No edema  Neurologic: No numbness, tingling, weakness, problems with balance or coordination  Psychiatric: No acute concerns other than as above. See mental status exam for more information.   Skin: No rashes or areas of acute infection    OBJECTIVE                                                      LABS:  Labs reviewed.  Pertinent data include:   Urine tox results positive for amphetamines and methamphetamine today.  Will recheck at next visit and send for confirmation at next visit if still positive.     Results for orders placed or performed in visit on 12/13/19   Urine Drugs of Abuse Screen Panel 13     Status: Abnormal   Result Value Ref Range    Cannabinoids ([id number]-[id number]) Not Detected NDET^Not Detected ng/mL    Phencyclidine (Phencyclidine) Not Detected NDET^Not Detected ng/mL    Cocaine (Benzoylecgonine) Not Detected NDET^Not Detected ng/mL    Methamphetamine (d-Methamphetamine) Detected, Abnormal Result (A) NDET^Not Detected ng/mL    Opiates (Morphine) Not Detected NDET^Not Detected ng/mL    Amphetamine (d-Amphetamine) Detected, Abnormal Result (A) NDET^Not Detected ng/mL    Benzodiazepines (Nordiazepam) Not Detected NDET^Not Detected ng/mL     Tricyclic Antidepressants (Desipramine) Not Detected NDET^Not Detected ng/mL    Methadone (Methadone) Not Detected NDET^Not Detected ng/mL    Barbiturates (Butalbital) Not Detected NDET^Not Detected ng/mL    Oxycodone (Oxycodone) Not Detected NDET^Not Detected ng/mL    Propoxyphene (Norpropoxyphene) Not Detected NDET^Not Detected ng/mL    Buprenorphine (Buprenorphine) Not Detected NDET^Not Detected ng/mL     PHYSICAL EXAM:  Pulse 80   Temp 97.9  F (36.6  C)   Resp 14   Wt 88.9 kg (196 lb)   SpO2 100%   BMI 26.58 kg/m      GENERAL APPEARANCE: alert, comfortable appearing  EYES: Eyes grossly normal to inspection  HENT: TM's normal, mouth without ulcers or lesions, nose no rhinorrhea  NECK: no adenopathy, thyromegaly or masses  RESP: lungs clear to auscultation - no rales, rhonchi or wheezes and no resp distress  CV: regular rates and rhythm, normal S1 S2,no murmur   ABDOMEN: soft, nontender, without hepatosplenomegaly or masses  MS: extremities normal- no gross deformities noted, gait normal, peripheral pulses normal and no edema  SKIN: no rashes, no jaundice, no obvious masses.   NEURO: Normal strength and tone, sensory exam grossly normal, no tremor    MENTAL STATUS EXAM:  Appearance/Behavior:No appearant distress  Speech: Normal  Mood/Affect: normal affect  Insight: Fair    ASSESSMENT/PLAN                                                      (F11.20) Opioid use disorder, severe, dependence (H) with chronic pain  (primary encounter diagnosis)  Comment: Per Epic - insurance does not cover 2-0.5mg films thus ordered changed to 4-1mg films.  Last opioid use reported as years ago.  UDS negative for opioids as expected.    Plan: buprenorphine HCl-naloxone HCl (SUBOXONE) 4-1 MG per film; take 1/2 film (2-0.5mg) once daily for 1 week and then return for follow up.  Discussed notifying his psychiatric provider that he is also starting buprenorphine to be sure she is aware of all medications he is taking.     (F10.21)  Alcohol use disorder, severe, in sustained remission (H)  Comment: Sober 5 years - hx DTs, no seizure hx   Plan: Continue to encourage abstinence.     (F41.1) Generalized anxiety disorder  Comment: Denies panic symptoms, unclear why patient is maintained on alprazolam 1 mg TID.  Denies ever misusing this in the past.  Discussed risk of combining with medications like suboxone, alcohol, and ambien.   Plan: Encouraged reduced alprazolam use.  Will discuss signing an JARET for Aurea Vasquez at next visit as patient's UDS negative for benzodiazepines despite taking alprazolam 1 mg 2-3 times daily and informing her of patient taking suboxone.      (F32.1) Moderate major depression (H)  Comment: Reports mood stable and anxiety improved with lexapro since started about 7 months ago.   Plan: Continue lexapro 30 mg daily managed by Aurea Vasquez CNP.     (G47.09) Initial insomnia   Comment: Taking ambien 10 mg nightly prn - would like to eventually come off.  This was encouraged.  Managed by psychiatric provider, Aurea Fircrest, CNP  Plan: Encourage reduction in ambien use and work on sleep hygiene and non-pharmacologic anxiety management techniques such as meditation or guided imagery.     (F17.200) Tobacco use disorder  Comment: reducing cigarette use - down to 5 daily with e-cig  Plan: Continue to use nicotine lozenges managed by PCP to reduce cigarette use.      (Z79.899) High risk medication use  Comment: Suboxone 2-0.5mg daily  Plan: Routine UDS, monitor for cravings and any return to use       Follow-Up: 1 week     ENCOUNTER FOR LONG TERM USE OF HIGH RISK MEDICATION   High Risk Drug Monitoring?  YES   Drug being monitored: Suboxone 2-0.5mg daily   Reason for drug: Opioid Use Disorder   What is being monitored?: Dosage, Cravings, Triggers and side effects      Patient counseling completed today:  Addiction Services expectations were reviewed and a copy was provided to patient at the completion of today's  visit.  The expectations addressed include; routine urine drug screens, frequency of office visits, cancellations/no-shows, and clinic contact information.  The patient was notified that our clinic has 72 business hours to complete any forms and a minimum of 24 business hours to address any medication refill requests.  Questions regarding these expectations were answered and the patient verbalizes an understanding and acceptance of the above expectations.     Discussed options for treatment of opioid use disorder with medications including Suboxone/sublocade, and Methadone.  The risks, benefits, side effects, appointment logistics, and future pain management considerations with all forms of these medications were reviewed at length.  Risk of overdose following a period of abstinence due to decrease tolerance was discussed including risk of death. Risk of overdose if using Suboxone with other substances particuarly alcohol or benzodiazepines was reviewed.      Counseled the patient on the importance of having a recovery program in addition to medication assisted treatment (MAT).  Components of a recovery program include having some type of sober network, avoiding isolating, willingness to change, avoiding triggers, and managing cravings.  Recommended to abstain from alcohol, reduce benzodiazepine use, THC, opioids, and other drugs of abuse.  Increased risk of relapse for opioids with use of these substances was discussed including risk of death.        Linda Benoit, CNP  [facility] - Addiction Medicine  [facility]  [phone]

## 2019-12-16 ENCOUNTER — OFFICE VISIT (OUTPATIENT)
Dept: FAMILY MEDICINE | Facility: CLINIC | Age: 39
End: 2019-12-16
Payer: COMMERCIAL

## 2019-12-16 VITALS
HEART RATE: 68 BPM | TEMPERATURE: 97.3 F | OXYGEN SATURATION: 99 % | WEIGHT: 193 LBS | DIASTOLIC BLOOD PRESSURE: 69 MMHG | SYSTOLIC BLOOD PRESSURE: 126 MMHG | BODY MASS INDEX: 26.18 KG/M2

## 2019-12-16 DIAGNOSIS — Z11.59 NEED FOR HEPATITIS C SCREENING TEST: Primary | ICD-10-CM

## 2019-12-16 LAB
ALBUMIN SERPL-MCNC: 3.5 G/DL (ref 3.4–5)
ALP SERPL-CCNC: 104 U/L (ref 40–150)
ALT SERPL W P-5'-P-CCNC: 268 U/L (ref 0–70)
ANION GAP SERPL CALCULATED.3IONS-SCNC: 3 MMOL/L (ref 3–14)
AST SERPL W P-5'-P-CCNC: 97 U/L (ref 0–45)
BILIRUB SERPL-MCNC: 0.7 MG/DL (ref 0.2–1.3)
BUN SERPL-MCNC: 18 MG/DL (ref 7–30)
CALCIUM SERPL-MCNC: 9 MG/DL (ref 8.5–10.1)
CHLORIDE SERPL-SCNC: 108 MMOL/L (ref 94–109)
CO2 SERPL-SCNC: 30 MMOL/L (ref 20–32)
CREAT SERPL-MCNC: 0.9 MG/DL (ref 0.66–1.25)
GFR SERPL CREATININE-BSD FRML MDRD: >90 ML/MIN/{1.73_M2}
GLUCOSE SERPL-MCNC: 65 MG/DL (ref 70–99)
POTASSIUM SERPL-SCNC: 4.6 MMOL/L (ref 3.4–5.3)
PROT SERPL-MCNC: 7.1 G/DL (ref 6.8–8.8)
SODIUM SERPL-SCNC: 141 MMOL/L (ref 133–144)

## 2019-12-16 PROCEDURE — 36415 COLL VENOUS BLD VENIPUNCTURE: CPT | Performed by: FAMILY MEDICINE

## 2019-12-16 PROCEDURE — 86803 HEPATITIS C AB TEST: CPT | Performed by: FAMILY MEDICINE

## 2019-12-16 PROCEDURE — 99213 OFFICE O/P EST LOW 20 MIN: CPT | Performed by: FAMILY MEDICINE

## 2019-12-16 PROCEDURE — 80053 COMPREHEN METABOLIC PANEL: CPT | Performed by: FAMILY MEDICINE

## 2019-12-16 NOTE — PROGRESS NOTES
Subjective     Edgar Williamson is a 39 year old male who presents to clinic today for the following health issues:    HPI   Pt received letter stating he has hep c. Pt is requesting lab work.  Patient comes in today with concern of possible wall hepatitis C  positive blood test, patient reports he went to donate plasma and 1 of his screening test came back positive for hepatitis C.  Otherwise, he denies any other symptom denies any abdominal pain, has no nausea, no vomiting, no change in his weight.  He denies using IV drugs.    Patient did have Hep C screening August 2019 which was nonreactive.    Patient Active Problem List   Diagnosis     CARDIOVASCULAR SCREENING; LDL GOAL LESS THAN 160     Moderate major depression (H)     Generalized anxiety disorder     Benign meningioma of brain (H)     Primary osteoarthritis of lumbar spine     Strain of lumbar region, initial encounter     Trichiasis of right lower eyelid     Stasis dermatitis of both legs     Seasonal allergies     Poisoning by benzodiazepines, undetermined, initial encounter     Opioid abuse, in remission (H)     Nodule of right lung     Lower urinary tract infectious disease     Leukocytosis     Kidney stone     Insomnia     Homeless single person     History of heroin abuse (H)     Eyelid laceration, right, initial encounter     Altered mental status, unspecified     Alcohol intoxication, with unspecified complication (H)     Alcohol abuse     Controlled substance agreement signed     No past surgical history on file.    Social History     Tobacco Use     Smoking status: Current Every Day Smoker     Packs/day: 0.25     Types: Cigarettes     Smokeless tobacco: Current User     Types: Chew   Substance Use Topics     Alcohol use: No     Family History   Problem Relation Age of Onset     Cancer Mother      Alcohol/Drug Father      Psychotic Disorder Father         anxiety     Asthma Maternal Grandmother      Psychotic Disorder Maternal Grandmother          anxiety     Heart Disease Maternal Grandfather      Circulatory Maternal Grandfather      Psychotic Disorder Maternal Grandfather         anxiety, depression         Current Outpatient Medications   Medication Sig Dispense Refill     ALPRAZolam (XANAX) 0.5 MG tablet Take 1 mg by mouth 3 times daily as needed  30 tablet 0     buprenorphine HCl-naloxone HCl (SUBOXONE) 4-1 MG per film Place 1/2 film under the tongue once daily for 1 week. 4 Film 0     escitalopram (LEXAPRO) 20 MG tablet Take 30 mg by mouth daily       gabapentin (NEURONTIN) 300 MG capsule Take 1 capsule (300 mg) by mouth 3 times daily 180 capsule 3     ibuprofen (ADVIL/MOTRIN) 200 MG tablet Take 600 mg by mouth       ibuprofen (ADVIL/MOTRIN) 600 MG tablet TAKE 1 TABLET (600 MG) BY MOUTH EVERY SIX HOURS AS NEEDED FOR PAIN.  0     ibuprofen (ADVIL/MOTRIN) 800 MG tablet Take 1 tablet (800 mg) by mouth every 8 hours as needed for moderate pain 180 tablet 6     naloxone (NARCAN) 1 mg/mL for intranasal kit (2 syringes with 2 mucosal atomizer device) In opioid overdose put cone in nostril and push 1/2 of contents into each nostril.  Repeat every 3 min if no response until help arrives. 2 Syringe 3     nicotine (NICORETTE) 4 MG lozenge Place 1 lozenge (4 mg) inside cheek as needed for smoking cessation 168 lozenge 3     zolpidem (AMBIEN) 10 MG tablet Take 1 tablet (10 mg) by mouth nightly as needed 30 tablet 0     Allergies   Allergen Reactions     Diphenhydramine Other (See Comments)     Restless Legs/Feet  Other reaction(s): Restless Legs/Feet  Restless legs  Other reaction(s): Restless Legs/Feet       Methadone Other (See Comments)     Had terrible itching and redness and was pulled off methadone     Mirtazapine Other (See Comments)     Restless Legs/Feet  Other reaction(s): Restless Legs/Feet  Restless legs.  Other reaction(s): Restless Legs/Feet       Seasonal Allergies      Trazodone Other (See Comments) and Unknown     Behavioral Disturbances  Other  reaction(s): Behavioral Disturbances  Behavorial disturbance.  - restless legs       Reviewed and updated as needed this visit by Provider         Review of Systems   ROS COMP: Constitutional, HEENT, cardiovascular, pulmonary, gi and gu systems are negative, except as otherwise noted.      Objective    /69 (BP Location: Right arm, Patient Position: Chair, Cuff Size: Adult Regular)   Pulse 68   Temp 97.3  F (36.3  C) (Oral)   Wt 87.5 kg (193 lb)   SpO2 99%   BMI 26.18 kg/m    Body mass index is 26.18 kg/m .  Physical Exam   GENERAL: healthy, alert and no distress  MS: no gross musculoskeletal defects noted, no edema    Diagnostic Test Results:  Labs reviewed in Epic  Orders Placed This Encounter   Procedures     Comprehensive metabolic panel     **Hepatitis C Screen Reflex to RNA FUTURE anytime     The Hepatitis C Screen testing will be used for patients born between 1945 and 1965 following the CDC recommendations. HEP C screening testing can also be ordered for any patient for which it is clinically recommended.           Assessment & Plan     1. Need for hepatitis C screening test  We will repeat hepatitis C screening, with liver enzyme test.  These come back negative , then patient could be likely has a false positive test.  Then he will repeat his test in 3 to 6 months.  - Comprehensive metabolic panel  - **Hepatitis C Screen Reflex to RNA FUTURE anytime     BMI:   Estimated body mass index is 26.18 kg/m  as calculated from the following:    Height as of 4/23/19: 1.829 m (6').    Weight as of this encounter: 87.5 kg (193 lb).   Weight management plan: Discussed healthy diet and exercise guidelines        See Patient Instructions    Return in about 3 months (around 3/16/2020) for Physical Exam.    Charlee Hayes MD  [facility]

## 2019-12-17 ENCOUNTER — TELEPHONE (OUTPATIENT)
Dept: FAMILY MEDICINE | Facility: CLINIC | Age: 39
End: 2019-12-17

## 2019-12-17 NOTE — TELEPHONE ENCOUNTER
Attempt #   Called patient at home number.  Was call answered?  Yes, relayed  Message from PCP to patient, Patient verbalized understanding and agreement with plan and then asked if other lab results will be in by tomorrow, also states has not taken the Suboxone today yet so will stop taking competely until receives a call from clinic informing otherwise.             Deepti Carrillo RN  [facility]

## 2019-12-17 NOTE — TELEPHONE ENCOUNTER
Please inform pt of results below  [facility]  Team 3 Coordinator     Please inform pt. Of his lab result, so far  His liver enzyme elevated, ( one year ago was normal )  Could be related to his recent use of Suboxone, or something else.    ( still waiting for his Hep C test )  Have him stop his Suboxone, until get his Hep C result  If hep C is negative ( was negative on 08/2019 ), then will repeat liver enzyme  For now please have him completely stop his Suboxone.    Thanks  Dr Hayes

## 2019-12-19 ENCOUNTER — TELEPHONE (OUTPATIENT)
Dept: FAMILY MEDICINE | Facility: CLINIC | Age: 39
End: 2019-12-19

## 2019-12-19 DIAGNOSIS — R74.8 ELEVATED LIVER ENZYMES: Primary | ICD-10-CM

## 2019-12-19 LAB
HCV AB SERPL QL IA: REACTIVE
HCV RNA SERPL NAA+PROBE-ACNC: ABNORMAL [IU]/ML
HCV RNA SERPL NAA+PROBE-LOG IU: ABNORMAL LOG IU/ML

## 2019-12-19 NOTE — PROGRESS NOTES
SUBJECTIVE                                                    CC: Patient presents with:  Drug Problem    BUPRENORPHINE FOLLOW UP: current dose suboxone 2-0.5mg daily.     Edgar Williamson is a 39 year old male who presents to clinic today for follow up of Buprenorphine.      Date of last visit:  12/13/2019  No-shows/Late cancels: None recent     Primary Care Provider: Charlee Hayes MD   [place] Board of Pharmacy Data Base Reviewed:    Yes;  reviewed today and no concerns for diversionary activity.  Most recent data includes:  12/13/2019 Suboxone 2 Mg-0.5 Mg Sl Film   #7.00 7 [place]hoeven  12/06/2019 Gabapentin 300 Mg Capsule   #90.00  30 Mo Taha  12/03/2019 Alprazolam 1 Mg Tablet   #90.00 30 Aurea Vasquez    Brief History:    Initial visit with me on 12/13/2019 when UDS was positive for amphetamines and methamphetamine.   History of opioid and alcohol use disorders, major depression, generalized anxiety, initial insomnia, and chronic pain secondary to osteoarthritis of lumbar spine.  No alcohol use since 2015.  Completed treatment at [facility] in 11/2015 after detox stay at [facility].  No hx seizures or DTs.  Abusing opioid pain pills since 2011.  Once he stopped drinking in 2015 he started using more opioid pain pills and self-medicating back pain using up to 60 mg oxycontin daily (smoking) and tried heroin once.  No hx IVDU.  Remote history of hallucinogen and cocaine use in 2009 and earlier.  Longest period of sobriety 4 years.  Hx of suboxone maintenance through [facility] for 4 months and was taking 16-4mg daily.     Psychiatric provider - Aurea Vasquez CP who manages his Lexapro 30 mg daily, Alprazolam 1 mg TID prn (not present in UDS - states he takes 2-3 times daily), Zolpidem 10 mg at HS (states he wants to get off this which was encouraged).     Hepatitis C Status -  08/02/2019 - Nonreactive (Care Everywhere [facility])     NICOTINE-cigarettes 5 daily and then e-cig throughout the day         "      Desire to quit - trying to quit cigarettes and using nicotine lozenges from PCP    Date of last use:   Alcohol - 11/02/2015  Opiates - Last prescribed in 2012.   Cocaine - last use 2009    HPI:    12/20/2019    Consent to communicate signed for Aurea Vasquez CNP who manages his Ambien, alprazolam 1mg TID prn and lexapro 30 mg.  She is no longer wanting to manage his medications due to addition of suboxone.  Taking alprazolam regularly TID per patient but this is not present in his urine as our lab test doesn't include alprazolam metabolite - will add on next UDS.  Walk in counseling still nearly weekly.  States he walked out of his psych appointment due to being upset and did not schedule a follow up visit.  He is still taking the ambien 10mg nightly for sleep. He was taking ambien 5 mg nightly and eventually wants to taper off.  Discussed how taper off and a different medication would be the plan if he were to have his medications managed with me.     He recently found out exposure to Hep C through plasma center letter and will obtain labs today (hepatic panel, hep c quant) ordered by his PCP due to missing his lab appointment earlier in the day.  No suboxone since Monday 12/16/2019 due to concern for liver damage with suboxone.  Has 3 films of suboxone left.  Patient wants to transfer care to writer for psychiatric medications.  Discussed how he would need to sign a consent to communicate in order for me to resume taking over his medications at their current doses.  Also discussed that alprazolam 1 mg TID would be tapered if managed through me and that this is not an appropriate long-term medication for generalized anxiety disorder.  Last alprazolam yesterday at 4PM.   Started a new job last week as a  which he reports is going well. Significant stress related to finding out he was exposed to Hepatitis C.  Denies SI, HI and reports he knows \"things will settle down.\"       Status since " last visit:      Since last visit patient has been: overall stable      Intensity:     There has been: no craving    Suboxone Dose: not taking - see above  Progression of Symptoms:     Cues to use and relapse triggers: none    Recovery program has been: ignored.   Accompanying Signs & Symptoms:    Side Effects: none.    Sobriety:     Status: no use since last visit.      Drug Screen: obtained.   Precipitating factors:    Triggers have been: non-existent.   Alleviating factors:    Contact with sponsor has been: no sponsor.     Family and support system has been: helpful.   Other Therapies Tried :     Patient has been going to recovery meetings: none    Patient has been participating in professional counseling/therapy: walk-in therapy      Social History     Social History Narrative    Updated : 12/13/2019    Living Situation:  Rents an apartment in \[place]", lives alone or with 10 yo son when he visits    Marital status:     Children: two children, shared custody and pays child support    Employment/financial status: currently not working due to pain - working on GED and may return to work as  once pain is stable     Current recovery program (meetings, sponsorship): None currently         Exercise/activity: Walks frequently, no regular exercise plan    Diet/food security: Stable, able to access food    Transportation: Own vehicle        Legal history: Denies     Social History:   Reviewed - See HPI for changes since last visit.       Problem list and histories reviewed & adjusted, as indicated.  Additional history: as documented    Patient Active Problem List    Diagnosis Date Noted     Primary osteoarthritis of lumbar spine 04/23/2019     Priority: Medium     Strain of lumbar region, initial encounter 04/23/2019     Priority: Medium     Benign meningioma of brain (H)      Priority: Medium     Stasis dermatitis of both legs 12/22/2017     Priority: Medium     Trichiasis of right lower eyelid 08/03/2017      Priority: Medium     Eyelid laceration, right, initial encounter 03/02/2017     Priority: Medium     Altered mental status, unspecified 03/02/2017     Priority: Medium     Alcohol intoxication, with unspecified complication (H) 03/02/2017     Priority: Medium     Homeless single person 06/20/2016     Priority: Medium     Poisoning by benzodiazepines, undetermined, initial encounter 02/22/2016     Priority: Medium     Generalized anxiety disorder 09/28/2015     Priority: Medium     Diagnosis updated by automated process. Provider to review and confirm.       Alcohol abuse 09/14/2015     Priority: Medium     Lower urinary tract infectious disease 11/27/2014     Priority: Medium     Leukocytosis 11/27/2014     Priority: Medium     Nodule of right lung 11/26/2014     Priority: Medium     Overview:   Repeat CT chest recommended in 6 months, 12 months and 24 months       Kidney stone 11/26/2014     Priority: Medium     Opioid abuse, in remission (H) 03/07/2014     Priority: Medium     History of heroin abuse (H) 03/07/2014     Priority: Medium     Controlled substance agreement signed 08/14/2013     Priority: Medium     Overview:   Was getting alprazolam from an outside provider. Will not prescribe controlled substances for pt.        Seasonal allergies 03/12/2013     Priority: Medium     Insomnia 03/12/2013     Priority: Medium     Moderate major depression (H) 01/17/2011     Priority: Medium     CARDIOVASCULAR SCREENING; LDL GOAL LESS THAN 160 10/31/2010     Priority: Medium       Current Medications:  ALPRAZolam (XANAX) 0.5 MG tablet, Take 1 mg by mouth 3 times daily as needed   buprenorphine HCl-naloxone HCl (SUBOXONE) 4-1 MG per film, Place 1/2 film under the tongue once daily for 1 week.  escitalopram (LEXAPRO) 20 MG tablet, Take 30 mg by mouth daily  gabapentin (NEURONTIN) 300 MG capsule, Take 1 capsule (300 mg) by mouth 3 times daily  ibuprofen (ADVIL/MOTRIN) 200 MG tablet, Take 600 mg by mouth  ibuprofen  (ADVIL/MOTRIN) 600 MG tablet, TAKE 1 TABLET (600 MG) BY MOUTH EVERY SIX HOURS AS NEEDED FOR PAIN.  ibuprofen (ADVIL/MOTRIN) 800 MG tablet, Take 1 tablet (800 mg) by mouth every 8 hours as needed for moderate pain  naloxone (NARCAN) 1 mg/mL for intranasal kit (2 syringes with 2 mucosal atomizer device), In opioid overdose put cone in nostril and push 1/2 of contents into each nostril.  Repeat every 3 min if no response until help arrives.  nicotine (NICORETTE) 4 MG lozenge, Place 1 lozenge (4 mg) inside cheek as needed for smoking cessation  zolpidem (AMBIEN) 10 MG tablet, Take 1 tablet (10 mg) by mouth nightly as needed    No current facility-administered medications on file prior to visit.       Allergies   Allergen Reactions     Diphenhydramine Other (See Comments)     Restless Legs/Feet  Other reaction(s): Restless Legs/Feet  Restless legs  Other reaction(s): Restless Legs/Feet       Methadone Other (See Comments)     Had terrible itching and redness and was pulled off methadone     Mirtazapine Other (See Comments)     Restless Legs/Feet  Other reaction(s): Restless Legs/Feet  Restless legs.  Other reaction(s): Restless Legs/Feet       Seasonal Allergies      Trazodone Other (See Comments) and Unknown     Behavioral Disturbances  Other reaction(s): Behavioral Disturbances  Behavorial disturbance.  - restless legs         REVIEW OF SYSTEMS:  General:  No acute withdrawal symptoms.  No recent infections or fever  Eyes:  No vision concerns.  No double vision.    Resp: No coughing, wheezing or shortness of breath  CV: No chest pains or palpitations  GI: No nausea, vomiting, abdominal pain, diarrhea.  No constipation  : No urinary frequency or dysuria    Musculoskeletal: No significant muscle or joint pains other than as above.  No edema  Neurologic: No numbness, tingling, weakness, problems with balance or coordination  Psychiatric: No acute concerns other than as above.   Skin: No rashes or areas of acute  infection      OBJECTIVE                                                    LABS:  Labs reviewed.  Urine tox results positive for nothing today.     Results for orders placed or performed in visit on 12/20/19   Urine Drugs of Abuse Screen Panel 13     Status: None   Result Value Ref Range    Cannabinoids (70-lid-2-carboxy-9-THC) Not Detected NDET^Not Detected ng/mL    Phencyclidine (Phencyclidine) Not Detected NDET^Not Detected ng/mL    Cocaine (Benzoylecgonine) Not Detected NDET^Not Detected ng/mL    Methamphetamine (d-Methamphetamine) Not Detected NDET^Not Detected ng/mL    Opiates (Morphine) Not Detected NDET^Not Detected ng/mL    Amphetamine (d-Amphetamine) Not Detected NDET^Not Detected ng/mL    Benzodiazepines (Nordiazepam) Not Detected NDET^Not Detected ng/mL    Tricyclic Antidepressants (Desipramine) Not Detected NDET^Not Detected ng/mL    Methadone (Methadone) Not Detected NDET^Not Detected ng/mL    Barbiturates (Butalbital) Not Detected NDET^Not Detected ng/mL    Oxycodone (Oxycodone) Not Detected NDET^Not Detected ng/mL    Propoxyphene (Norpropoxyphene) Not Detected NDET^Not Detected ng/mL    Buprenorphine (Buprenorphine) Not Detected NDET^Not Detected ng/mL       PHYSICAL EXAM:  /60   Pulse 85   Temp 97.9  F (36.6  C) (Oral)   Resp 16   Ht 1.829 m (6')   Wt 84.8 kg (187 lb)   SpO2 98%   BMI 25.36 kg/m      GENERAL APPEARANCE:  alert, comfortable appearing  EYES:Eyes grossly normal to inspection  NEURO:  Gait normal.  No tremor. Coordination intact.     MENTAL STATUS EXAM:  Appearance:  awake, alert  Attitude:  cooperative and evasive  Eye Contact:  good  Mood:  anxious  Affect:  appropriate and in normal range and mood congruent  Speech:  clear, coherent rate /rhythm are normal  Psychomotor Behavior:  no evidence of tardive dyskinesia, dystonia, or tics  Throught Process:  logical, linear and goal oriented  Associations:  no loose associations  Thought Content:  no evidence of suicidal  ideation or homicidal ideation, no evidence of psychotic thought, no auditory hallucinations present and no visual hallucinations present  Insight:  fair  Judgement:  fair  Oriented to:  time, person, and place  Attention Span and Concentration:  intact  Recent and Remote Memory:  intact  Language fund of knowledge are adequate      ASSESSMENT/PLAN                                                         (F11.20) Opioid use disorder, severe, dependence (H) with chronic pain  (primary encounter diagnosis)  Comment: Last opioid use reported as years ago.  UDS negative for opioids as expected.  Stopped buprenorphine Monday 12/15/2019 due to possible Hepatitis C as directed by Dr. Hayes.  Hep C labs drawn today. #3 films left per patient.   Plan: Hold buprenorphine-naloxone for now.  Will call patient and discuss option for resuming suboxone on Monday 12/23/2019      (F10.21) Alcohol use disorder, severe, in sustained remission (H)  Comment: Sober 5 years - hx DTs, no seizure hx   Plan: Continue to encourage abstinence.      (F41.1) Generalized anxiety disorder  Comment: Wants to transfer psychiatric medications to writer from Aurea Vasquez CNP  Plan: Consent to communicate signed by patient.  Aware that if managing medications with me that alprazolam will be tapered down with plan to transition to another non-benzodiazepine.     (F32.1) Moderate major depression (H)  Comment: Reports mood stable and anxiety improved with lexapro since started about 7 months ago. Wants to transfer psychiatric medications to writer from Aurea Vasquez CNP  Plan: Consent to communicate signed by patient.     (G47.09) Initial insomnia   Comment: Taking ambien 10 mg nightly, has taken less in the past.  Reviewed that this is not a good long-term sleep option.   Plan: Encourage reduction in ambien use and work on sleep hygiene and non-pharmacologic anxiety management techniques such as meditation or guided imagery.  Patient aware that  if medications are managed by writer that plan will be to taper off ambien.          (Z79.899) High risk medication use  Comment: Suboxone 2-0.5mg daily  Plan: Routine UDS, monitor for cravings and any return to use     ENCOUNTER FOR LONG TERM USE OF HIGH RISK MEDICATION   High Risk Drug Monitoring?  YES   Drug being monitored: Buprenorphine   Reason for drug: Opioid use disorder   What is being monitored?: Dosage, Cravings, Trigger, side effects, and continued abstinence.      Follow-up: 21/31/2019 per patient request due to holiday schedule and I will call patient on Monday 12/23/2019 to check in about restarting suboxone.     Patient counseling completed today:  Counseled the patient on the importance of having a recovery program in addition to Suboxone maintenance.  Also discussed having a sober support network, not isolating, being open, honest, and willing to change, avoiding triggers and managing cravings. In addition, he should abstain from alcohol, benzodiazepines, THC, opioids and other drugs of abuse. Risk of overdose following a period of abstinence due to decrease tolerance was discussed including risk of death. Risk of overdose if using Suboxone with other substances particuarly benzodiazepines, alcohol, or other CNS depressants was reviewed.      Linda Benoit, CNP  [facility] - Addiction Medicine  [facility]  [phone]

## 2019-12-19 NOTE — TELEPHONE ENCOUNTER
Received the following message from Dr Hayes:    Please inform pt. Of his Hep C result   Was reactive, but could not do Viral loads   Need to repeat tests, in addtion, repeat his liver enzyme   Please make lab appt for pt. To come in and repeat tests ( no need for fasting )   thanks

## 2019-12-19 NOTE — TELEPHONE ENCOUNTER
Called and informed patient of message below and assisted with making a lab appointment for tomorrow. He verbalized understanding.     Ijeoma Raymond RN

## 2019-12-20 ENCOUNTER — OFFICE VISIT (OUTPATIENT)
Dept: ADDICTION MEDICINE | Facility: CLINIC | Age: 39
End: 2019-12-20
Payer: COMMERCIAL

## 2019-12-20 ENCOUNTER — APPOINTMENT (OUTPATIENT)
Dept: LAB | Facility: CLINIC | Age: 39
End: 2019-12-20
Payer: COMMERCIAL

## 2019-12-20 VITALS
BODY MASS INDEX: 25.33 KG/M2 | RESPIRATION RATE: 16 BRPM | TEMPERATURE: 97.9 F | SYSTOLIC BLOOD PRESSURE: 110 MMHG | OXYGEN SATURATION: 98 % | HEIGHT: 72 IN | DIASTOLIC BLOOD PRESSURE: 60 MMHG | WEIGHT: 187 LBS | HEART RATE: 85 BPM

## 2019-12-20 DIAGNOSIS — R74.8 ELEVATED LIVER ENZYMES: ICD-10-CM

## 2019-12-20 DIAGNOSIS — F11.20 OPIOID USE DISORDER, SEVERE, DEPENDENCE (H): ICD-10-CM

## 2019-12-20 DIAGNOSIS — F10.21 ALCOHOL USE DISORDER, SEVERE, IN SUSTAINED REMISSION (H): Primary | ICD-10-CM

## 2019-12-20 LAB
ALBUMIN SERPL-MCNC: 3.5 G/DL (ref 3.4–5)
ALP SERPL-CCNC: 92 U/L (ref 40–150)
ALT SERPL W P-5'-P-CCNC: 119 U/L (ref 0–70)
AMPHETAMINES UR QL: NOT DETECTED NG/ML
AST SERPL W P-5'-P-CCNC: 30 U/L (ref 0–45)
BARBITURATES UR QL SCN: NOT DETECTED NG/ML
BENZODIAZ UR QL SCN: NOT DETECTED NG/ML
BILIRUB DIRECT SERPL-MCNC: 0.4 MG/DL (ref 0–0.2)
BILIRUB SERPL-MCNC: 0.6 MG/DL (ref 0.2–1.3)
BUPRENORPHINE UR QL: NOT DETECTED NG/ML
CANNABINOIDS UR QL: NOT DETECTED NG/ML
COCAINE UR QL SCN: NOT DETECTED NG/ML
D-METHAMPHET UR QL: NOT DETECTED NG/ML
METHADONE UR QL SCN: NOT DETECTED NG/ML
OPIATES UR QL SCN: NOT DETECTED NG/ML
OXYCODONE UR QL SCN: NOT DETECTED NG/ML
PCP UR QL SCN: NOT DETECTED NG/ML
PROPOXYPH UR QL: NOT DETECTED NG/ML
PROT SERPL-MCNC: 7.6 G/DL (ref 6.8–8.8)
TRICYCLICS UR QL SCN: NOT DETECTED NG/ML

## 2019-12-20 PROCEDURE — 80306 DRUG TEST PRSMV INSTRMNT: CPT | Performed by: NURSE PRACTITIONER

## 2019-12-20 PROCEDURE — 80076 HEPATIC FUNCTION PANEL: CPT | Performed by: NURSE PRACTITIONER

## 2019-12-20 PROCEDURE — 99214 OFFICE O/P EST MOD 30 MIN: CPT | Performed by: NURSE PRACTITIONER

## 2019-12-20 PROCEDURE — 87522 HEPATITIS C REVRS TRNSCRPJ: CPT | Performed by: NURSE PRACTITIONER

## 2019-12-20 PROCEDURE — 86803 HEPATITIS C AB TEST: CPT | Performed by: NURSE PRACTITIONER

## 2019-12-20 PROCEDURE — 36415 COLL VENOUS BLD VENIPUNCTURE: CPT | Performed by: NURSE PRACTITIONER

## 2019-12-20 ASSESSMENT — MIFFLIN-ST. JEOR: SCORE: 1791.23

## 2019-12-20 NOTE — PATIENT INSTRUCTIONS
- Hold of on taking suboxone until 12/23/2019 and we will talk on the phone.     - I will reach out to Aurea about taking over the medications.     - I will see you in clinic in a week with a plan for your future psychiatric medications.     - Call us if you are having any issues before your next appointment.

## 2019-12-22 LAB — HCV AB SERPL QL IA: REACTIVE

## 2019-12-23 ENCOUNTER — TELEPHONE (OUTPATIENT)
Dept: ADDICTION MEDICINE | Facility: CLINIC | Age: 39
End: 2019-12-23

## 2019-12-23 ENCOUNTER — TELEPHONE (OUTPATIENT)
Dept: FAMILY MEDICINE | Facility: CLINIC | Age: 39
End: 2019-12-23

## 2019-12-23 DIAGNOSIS — R74.8 ELEVATED LIVER ENZYMES: Primary | ICD-10-CM

## 2019-12-23 DIAGNOSIS — B18.2 CHRONIC HEPATITIS C WITHOUT HEPATIC COMA (H): ICD-10-CM

## 2019-12-23 DIAGNOSIS — F11.20 OPIOID USE DISORDER, SEVERE, DEPENDENCE (H): ICD-10-CM

## 2019-12-23 LAB
HCV RNA SERPL NAA+PROBE-ACNC: 218 [IU]/ML
HCV RNA SERPL NAA+PROBE-LOG IU: 2.3 LOG IU/ML

## 2019-12-23 NOTE — TELEPHONE ENCOUNTER
----- Message from Charlee Hayes MD sent at 12/23/2019  8:04 AM CST -----  Please inform patient with his repeat lab results.    He is active for hepatitis C.  His viral loads,  detected to have viral load for hepatitis C.  His liver enzyme is better.    Because of his active, hepatitis C infections,  have patient continue holding his Suboxone.  In addition, he was referred to see hepatologist, for his hepatitis C infection.  Please asked patient if he is up-to-date with his current hepatitis B and hepatitis A immunization.  thanks

## 2019-12-23 NOTE — TELEPHONE ENCOUNTER
Called and notified patient of the message below.  Phone number given to patient to schedule appointment with hepatologist.  He stated that he will call and get this scheduled,.    Patient does not know if he ever received his immunizations.    Routed to PCP.    Azra Gonzalez RN,BSN  [facility]

## 2019-12-23 NOTE — TELEPHONE ENCOUNTER
Reason for Call:  Call Back    Detailed comments: Patient had called stating that he is waiting for a call from Linda regarding his medication and his test results.  Patient had said linda told him she would call today around 10:30 and he still has not heard anything from her.      Phone Number Patient can be reached at: Cell number on file:    Telephone Information:   Mobile [phone]       Best Time: Anytime    Can we leave a detailed message on this number? YES    Call taken on 12/23/2019 at 11:23 AM by Shweta Lopez

## 2019-12-23 NOTE — TELEPHONE ENCOUNTER
11:25AM: Spoke to provider-- given direction to inform patient that she still hasn't heard from  yet and that patient can resume suboxone in interim if he desires.     11:35AM: Spoke to patient-- informed of this. States he talked to 's nurse this morning about his results and has restarted his suboxone already. Patient states that there was discussion about increasing dosing at the last visit and he was wondering about this option.     11:38 AM: Discussed this with provider. Per provider, patient can stay on current dose of suboxone (2mg daily)-- can come in sooner to discuss dose change if he would like. Ok given from provider to call in bridge to get patient to follow up on 12/31.    12:07 PM: spoke to patient and informed him-- states he will not be able to come in earlier. Informed patient that Rx would be sent to the pharmacy. Patient not out currently, so will send to provider to sign.       Cathleen Burger RN  12/23/19  11:36 AM

## 2019-12-24 ENCOUNTER — DOCUMENTATION ONLY (OUTPATIENT)
Dept: ADDICTION MEDICINE | Facility: CLINIC | Age: 39
End: 2019-12-24

## 2019-12-24 RX ORDER — BUPRENORPHINE AND NALOXONE 4; 1 MG/1; MG/1
FILM, SOLUBLE BUCCAL; SUBLINGUAL
Qty: 4 FILM | Refills: 0 | Status: SHIPPED | OUTPATIENT
Start: 2019-12-24 | End: 2020-02-19

## 2019-12-24 RX ORDER — BUPRENORPHINE AND NALOXONE 4; 1 MG/1; MG/1
FILM, SOLUBLE BUCCAL; SUBLINGUAL
Qty: 4 FILM | Refills: 0 | Status: SHIPPED | OUTPATIENT
Start: 2019-12-24 | End: 2019-12-24

## 2019-12-24 NOTE — TELEPHONE ENCOUNTER
Suboxone bridge for 2-0.5mg daily e-prescribed at this time.  Will follow up with patient regarding dose increase request at his next office visit.

## 2019-12-24 NOTE — PROGRESS NOTES
"Writer contacted [facility] # [phone] regarding patient's psychiatric medications as he is requesting writer to take them over.  He reports being seen at [facility] the last two years.      Per nursing staff at [facility] Edgar told Aurea Vasquez that he would be getting off suboxone and wanted to continue on Alprazolam 1 mg TID and ambien 10 mg nightly.  He was given medications to last until 12/31/2019 by Aurea Ocampo CNP and that he can be seen by [facility] in the future for management of his psychiatric medications but they will not be continuing his alprazolam or ambien with the suboxone.  Urine confirmatory alprazolam test ordered for 12/31/2019 office visit with .    Consent to communicate faxed to [facility] staff per their request.   "

## 2019-12-26 ENCOUNTER — TELEPHONE (OUTPATIENT)
Dept: ADDICTION MEDICINE | Facility: CLINIC | Age: 39
End: 2019-12-26

## 2019-12-26 NOTE — TELEPHONE ENCOUNTER
Reason for Call:  prescription    Detailed comments: Patient had called saying that Provider was supposed to get into contact with his old provider to get details on medication. Since Linda will be the one prescribing all his medication. Patient also stated that he is almost out and is unsure what to do until linda starts prescribing everything.    Phone Number Patient can be reached at: Cell number on file:    Telephone Information:   Mobile [phone]       Best Time: Anytime    Can we leave a detailed message on this number? YES    Call taken on 12/26/2019 at 10:54 AM by Shweta Lopez

## 2019-12-26 NOTE — TELEPHONE ENCOUNTER
"Huddled with provider-- per Linda, patient should have enough meds to get to 12/31 when she sees him. Provider will not be refilling  ambien or alprazolam early, and refills of those should go through Aurea until 12/31. Provider also requested to reiterate to patient that if he is going to continue suboxone and have Linda take over prescribing his ambien and alprazolam, she will be tapering him off of those medications.     Writer spoke to patient-- he was short with writer and snapped \"Don't you remember our conversation?\" when writer explained he should have meds to last until 12/31. Writer informed patient we hadn't talked today and he explained that he left his meds at a holiday party-- stated they were in his backpack which \"ended up going missing\". Writer explained that he would need to talk to Aurea about those refills as Linda isn't taking over prescribing until the 31st. Writer also informed about Linda tapering him off his meds if she takes over prescribing and he decides to stay on suboxone. Patient stated \"Well I guess I will have to wait for my appointment with the doctor then because there are questions I have about that and this back and forth is causing communication issues\". Patient asked about his appt date/time-- was given to him. Patient stated \"I will see her then\" and hung up.    Will route to provider as NIXON.    Cathleen Burger RN  12/26/19  12:45 PM    "

## 2019-12-27 NOTE — TELEPHONE ENCOUNTER
екатерина  RECORDS RECEIVED FROM: Internal - Elevated liver enzymes + Chronic hepatitis C without hepatic coma, per pt, all recs [facility]   DATE RECEIVED: 01.10.2019   NOTES STATUS DETAILS   OFFICE NOTE from referring provider Internal 12.23.2019 Consult Dr. Hayes   OFFICE NOTES from other specialists N/A    DISCHARGE SUMMARY from hospital N/A    MEDICATION LIST Internal / CE    LIVER BIOSPY (IF APPLICABLE)      PATHOLOGY REPORTS  N/A    IMAGING     ENDOSCOPY (IF AVAILABLE) N/A    COLONOSCOPY (IF AVAILABLE) N/A    ULTRASOUND LIVER N/A    CT OF ABDOMEN N/A    MRI OF LIVER N/A    FIBROSCAN, US ELASTOGRAPHY, FIBROSIS SCAN, MR ELASTOGRAPHY N/A    LABS     HEPATIC PANEL (LIVER PANEL) Internal 12.20.2019   BASIC METABOLIC PANEL Care Everywhere 02.18.2019   COMPLETE METABOLIC PANEL Care Everywhere 06.17.2018   COMPLETE BLOOD COUNT (CBC) Care Everywhere 06.14.2019   INTERNATIONAL NORMALIZED RATIO (INR) N/A    HEPATITIS C ANTIBODY N/A    HEPATITIS C VIRAL LOAD/PCR N/A    HEPATITIS C GENOTYPE N/A    HEPATITIS B SURFACE ANTIGEN N/A    HEPATITIS B SURFACE ANTIBODY N/A    HEPATITIS B DNA QUANT LEVEL N/A    HEPATITIS B CORE ANTIBODY N/A

## 2019-12-30 ENCOUNTER — TELEPHONE (OUTPATIENT)
Dept: ADDICTION MEDICINE | Facility: CLINIC | Age: 39
End: 2019-12-30

## 2019-12-30 ENCOUNTER — TELEPHONE (OUTPATIENT)
Dept: FAMILY MEDICINE | Facility: CLINIC | Age: 39
End: 2019-12-30

## 2019-12-30 DIAGNOSIS — M47.816 PRIMARY OSTEOARTHRITIS OF LUMBAR SPINE: Primary | ICD-10-CM

## 2019-12-30 DIAGNOSIS — S39.012A STRAIN OF LUMBAR REGION, INITIAL ENCOUNTER: ICD-10-CM

## 2019-12-30 NOTE — TELEPHONE ENCOUNTER
Writer left a VM with [facility] psychiatry nurse line that patient has opted not to continue suboxone maintenance through [facility] and instead will be continuing psychiatric services at [facility].      No further medications will be ordered by writer unless patient is seen in clinic.  Closing encounter at this time.

## 2019-12-30 NOTE — TELEPHONE ENCOUNTER
Reason for Call: Request for an order or referral:    Order or referral being requested: PT referral for back pain.    Date needed: as soon as possible    Has the patient been seen by the PCP for this problem? YES    Additional comments: Pt would like to speak with nurse.    Phone number Patient can be reached at:  Home number on file [phone] (home)    Best Time:  ASAP    Can we leave a detailed message on this number?  NO    Call taken on 12/30/2019 at 12:17 PM by Luda More

## 2019-12-30 NOTE — TELEPHONE ENCOUNTER
"Reason for Call:  Other     Detailed comments:  Patient called stating he is discontinuing suboxone maintenance and psychiatry through our clinic. Patient requests Provider call Psychiatrist he will be following up with. Writer attempted to ask for Psychiatrist information, patient stated \"she has all that information\". Writer tried explaining to patient that it would be easier if he gave the writer the information, so that the Provider does not have to search for it. Patient states \"she knows who to call\"    Phone Number Patient can be reached at: Home number on file [phone] (home)    Best Time: Any    Can we leave a detailed message on this number? YES    Call taken on 12/30/2019 at 9:55 AM by Crista Ellis      "

## 2020-01-07 ENCOUNTER — TELEPHONE (OUTPATIENT)
Dept: GASTROENTEROLOGY | Facility: CLINIC | Age: 40
End: 2020-01-07

## 2020-01-07 DIAGNOSIS — B19.20 HEPATITIS C VIRUS INFECTION WITHOUT HEPATIC COMA, UNSPECIFIED CHRONICITY: Primary | ICD-10-CM

## 2020-01-10 ENCOUNTER — PRE VISIT (OUTPATIENT)
Dept: GASTROENTEROLOGY | Facility: CLINIC | Age: 40
End: 2020-01-10

## 2020-01-15 ENCOUNTER — TELEPHONE (OUTPATIENT)
Dept: FAMILY MEDICINE | Facility: CLINIC | Age: 40
End: 2020-01-15

## 2020-01-15 DIAGNOSIS — S39.012A STRAIN OF LUMBAR REGION, INITIAL ENCOUNTER: Primary | ICD-10-CM

## 2020-01-15 RX ORDER — LIDOCAINE 4 G/G
1 PATCH TOPICAL EVERY 24 HOURS
Qty: 30 PATCH | Refills: 4 | Status: SHIPPED | OUTPATIENT
Start: 2020-01-15 | End: 2023-04-05

## 2020-01-15 NOTE — TELEPHONE ENCOUNTER
Reason for Call:  Other prescription  (Lidocaine patches)    Detailed comments: Pt would like a call back to discuss if he is able to get a Rx.    Phone Number Patient can be reached at: Cell number on file:    Telephone Information:   Mobile [phone]       Best Time: Anytime    Can we leave a detailed message on this number? YES    Call taken on 1/15/2020 at 10:27 AM by Luda More

## 2020-01-15 NOTE — TELEPHONE ENCOUNTER
Called patient for more information. He is requesting a prescription for lidocaine patches for his back. Pharmacy cued. Routed to provider for review.     Ijeoma Raymond RN

## 2020-02-14 ENCOUNTER — TELEPHONE (OUTPATIENT)
Dept: FAMILY MEDICINE | Facility: CLINIC | Age: 40
End: 2020-02-14

## 2020-02-14 NOTE — TELEPHONE ENCOUNTER
Preferred Location: [facility]: [phone]      Please be aware that coverage of these services is subject to the terms and limitations of your health insurance plan.  Call member services at your health plan with any benefit or coverage questions.  Any procedures must be performed at a [facility] facility OR coordinated by your clinic's referral office.    Please bring the following with you to your appointment:    (1) Any X-Rays, CTs or MRIs which have been performed.  Contact the facility where they were done to arrange for  prior to your scheduled appointment.    (2) List of current medications   (3) This referral request   (4) Any documents/labs given to you for this referral          Order Questions     Question Answer Comment   Reason for Consult Liver Disease     Other (Specify in Comments)           Associated Diagnoses     Elevated liver enzymes [R74.8]  - Primary       Chronic hepatitis C without hepatic coma (H) [B18.2]

## 2020-02-14 NOTE — TELEPHONE ENCOUNTER
Reason for Call:  Other     Detailed comments: Patient was told to schedule appointment for Hep C treatment. Please advise.    Phone Number Patient can be reached at: Cell number on file:    Telephone Information:   Mobile [phone]       Best Time:     Can we leave a detailed message on this number?     Call taken on 2/14/2020 at 12:01 PM by Pili Marti

## 2020-02-17 ENCOUNTER — TELEPHONE (OUTPATIENT)
Dept: GASTROENTEROLOGY | Facility: CLINIC | Age: 40
End: 2020-02-17

## 2020-02-17 NOTE — TELEPHONE ENCOUNTER
Patient contacted and reminded of upcoming appointment.  Patient confirmed they will be attending.  Patient instructed to bring updated medications list to appointment.    Ayala Carrillo on 2/17/2020 at 3:14 PM

## 2020-02-19 ENCOUNTER — OFFICE VISIT (OUTPATIENT)
Dept: GASTROENTEROLOGY | Facility: CLINIC | Age: 40
End: 2020-02-19
Attending: PHYSICIAN ASSISTANT
Payer: COMMERCIAL

## 2020-02-19 VITALS
WEIGHT: 189.5 LBS | SYSTOLIC BLOOD PRESSURE: 109 MMHG | HEIGHT: 72 IN | TEMPERATURE: 98.1 F | DIASTOLIC BLOOD PRESSURE: 66 MMHG | BODY MASS INDEX: 25.67 KG/M2 | HEART RATE: 82 BPM | OXYGEN SATURATION: 96 %

## 2020-02-19 DIAGNOSIS — B18.2 CHRONIC HEPATITIS C WITHOUT HEPATIC COMA (H): Primary | ICD-10-CM

## 2020-02-19 DIAGNOSIS — B19.20 HEPATITIS C VIRUS INFECTION WITHOUT HEPATIC COMA, UNSPECIFIED CHRONICITY: ICD-10-CM

## 2020-02-19 LAB
ALBUMIN SERPL-MCNC: 3.6 G/DL (ref 3.4–5)
ALP SERPL-CCNC: 63 U/L (ref 40–150)
ALT SERPL W P-5'-P-CCNC: 40 U/L (ref 0–70)
ANION GAP SERPL CALCULATED.3IONS-SCNC: 4 MMOL/L (ref 3–14)
AST SERPL W P-5'-P-CCNC: 20 U/L (ref 0–45)
BILIRUB DIRECT SERPL-MCNC: 0.1 MG/DL (ref 0–0.2)
BILIRUB SERPL-MCNC: 0.2 MG/DL (ref 0.2–1.3)
BUN SERPL-MCNC: 18 MG/DL (ref 7–30)
CALCIUM SERPL-MCNC: 8.9 MG/DL (ref 8.5–10.1)
CHLORIDE SERPL-SCNC: 109 MMOL/L (ref 94–109)
CO2 SERPL-SCNC: 27 MMOL/L (ref 20–32)
CREAT SERPL-MCNC: 0.76 MG/DL (ref 0.66–1.25)
ERYTHROCYTE [DISTWIDTH] IN BLOOD BY AUTOMATED COUNT: 15.2 % (ref 10–15)
GFR SERPL CREATININE-BSD FRML MDRD: >90 ML/MIN/{1.73_M2}
GLUCOSE SERPL-MCNC: 144 MG/DL (ref 70–99)
HCT VFR BLD AUTO: 45.2 % (ref 40–53)
HGB BLD-MCNC: 14.8 G/DL (ref 13.3–17.7)
INR PPP: 1 (ref 0.86–1.14)
MCH RBC QN AUTO: 29.2 PG (ref 26.5–33)
MCHC RBC AUTO-ENTMCNC: 32.7 G/DL (ref 31.5–36.5)
MCV RBC AUTO: 89 FL (ref 78–100)
PLATELET # BLD AUTO: 376 10E9/L (ref 150–450)
POTASSIUM SERPL-SCNC: 4.2 MMOL/L (ref 3.4–5.3)
PROT SERPL-MCNC: 7.4 G/DL (ref 6.8–8.8)
RBC # BLD AUTO: 5.07 10E12/L (ref 4.4–5.9)
SODIUM SERPL-SCNC: 140 MMOL/L (ref 133–144)
WBC # BLD AUTO: 8.7 10E9/L (ref 4–11)

## 2020-02-19 PROCEDURE — 87902 NFCT AGT GNTYP ALYS HEP C: CPT | Performed by: INTERNAL MEDICINE

## 2020-02-19 PROCEDURE — 80048 BASIC METABOLIC PNL TOTAL CA: CPT | Performed by: INTERNAL MEDICINE

## 2020-02-19 PROCEDURE — 80076 HEPATIC FUNCTION PANEL: CPT | Performed by: INTERNAL MEDICINE

## 2020-02-19 PROCEDURE — 87522 HEPATITIS C REVRS TRNSCRPJ: CPT | Performed by: INTERNAL MEDICINE

## 2020-02-19 PROCEDURE — 86704 HEP B CORE ANTIBODY TOTAL: CPT | Performed by: INTERNAL MEDICINE

## 2020-02-19 PROCEDURE — 87340 HEPATITIS B SURFACE AG IA: CPT | Performed by: INTERNAL MEDICINE

## 2020-02-19 PROCEDURE — 85027 COMPLETE CBC AUTOMATED: CPT | Performed by: INTERNAL MEDICINE

## 2020-02-19 PROCEDURE — 36415 COLL VENOUS BLD VENIPUNCTURE: CPT | Performed by: INTERNAL MEDICINE

## 2020-02-19 PROCEDURE — 85610 PROTHROMBIN TIME: CPT | Performed by: INTERNAL MEDICINE

## 2020-02-19 PROCEDURE — 86803 HEPATITIS C AB TEST: CPT | Performed by: INTERNAL MEDICINE

## 2020-02-19 PROCEDURE — 86803 HEPATITIS C AB TEST: CPT | Performed by: PHYSICIAN ASSISTANT

## 2020-02-19 PROCEDURE — 87389 HIV-1 AG W/HIV-1&-2 AB AG IA: CPT | Performed by: INTERNAL MEDICINE

## 2020-02-19 PROCEDURE — 86706 HEP B SURFACE ANTIBODY: CPT | Performed by: INTERNAL MEDICINE

## 2020-02-19 ASSESSMENT — PAIN SCALES - GENERAL: PAINLEVEL: NO PAIN (0)

## 2020-02-19 ASSESSMENT — MIFFLIN-ST. JEOR: SCORE: 1802.57

## 2020-02-19 NOTE — LETTER
2/19/2020       RE: Edgar Williamson  [address]  [place] [address]     Dear Colleague,    Thank you for referring your patient, Edgar Williamson, to the [facility] at [facility]. Please see a copy of my visit note below.    Hepatology Clinic Note  Edgar Williamson   Date of Birth 1980  Date of Service 2/19/2020    REASON FOR CONSULTATION: Hepatitis C  REFERRING PROVIDER: Abigail Deshpande MD          Assessment/plan:   Edgar Williamson is a 39 year old male with history of hepatitis C, unknown genotype and treatment naive. Currently there are no biochemical or physical signs of cirrhosis. He has relatively normal transaminases. His viral RNA level is pending at the time of visit today. We discussed the natural course of Hepatitis C virus and the benefits of treating the disease. We discussed the treatment regimen and medication side effects. Patient does have recent drug use and will be enrolling in chemical dependency treatment within the next week. We discussed risk reduction strategies such as condom use with sexual activity and using clean needles and syringes. He declines supplies at this time and his motivated for sobriety. Patient would be a good candidate for Hepatitis C treatment to prevent worsening fibrosis, prevent extrahepatic manifestations and decrease the likelihood of transmission of the virus.     - Will plan to send prescription for Hepatitis C pending positive HCV RNA level, genotype and fibrosis  - Repeat HCV RNA at the end of treatment and 12-weeks after finishing treatment to determine SVR  - Will confirm enrollment in chemical dependency program  - Follow-up in Hepatology Clinic as needed    Nathan Christensen PA-C   [facility]    -----------------------------------------------------       HPI:   Edgar Williamson is a 39 year old male  presenting for evaluation and treatment of Hepatitis C.      Hepatitis C   -Genotype unknown  -Diagnosed: December 2019  -History: Hx of IVDU  -Prior biopsy: No   -Prior treatments: Naive    Patient states that he was first notified that he had hepatitis C after he received a letter after donating plasma.   He states he likely acquired the virus through history of IV drug use he last used 2 weeks ago.  He will be going into treatment at [facility] within the next week.  Currently his appetite is decent.  He did experience some weight loss when using over the past 6 months.  He does have regular bowel movements.    Per chart review, patient did have a positive hepatitis C antibody in December 2019.  In August 2018, he had a nonreactive hepatitis C antibody.  He does not recall becoming sick or jaundice within the last 6 months.    Patient denies jaundice, lower extremity edema, abdominal distension or confusion.  Patient also denies melena, hematochezia or hematemesis. Patient denies weight loss, fevers, sweats or chills.    PMH: Anxiety, depression, history of nephrolithiasis, osteoarthritis     SMH: lithotripsy    Medications: See below    He is currently smoking 7 cigarettes a day as well as using smokeless tobacco.  He states he is cutting down.  He quit drinking alcohol in 2015 and states that he did have a history of alcohol abuse and at his peak was drinking 1.75 liter of vodka every few days.  He went through chemical dependency treatment at that time.  As stated earlier, he is awaiting placement with lodging through [facility] chemical dependency treatment.  He is currently living at a mental health facility called [facility].  He states that his uncle and aunt had cirrhosis likely due to heavy alcohol use.  No other known history of liver cancer.    Lab work-up thus far:   HCV RNA - 12,443 on 2/29/2020  HAV Ab  HBV SAb0.58  HBV SAg nonreactive  HBV CAb nonreactive  HIV nonreactive       Medical hx Surgical hx   Past Medical History:   Diagnosis Date      Anxiety      Benign meningioma of brain (H)      Depressive disorder      Moderate major depression (H) 1/17/2011     Primary osteoarthritis of lumbar spine 4/23/2019    No past surgical history on file.              Medications:     Current Outpatient Medications   Medication     ALPRAZolam (XANAX) 0.5 MG tablet     escitalopram (LEXAPRO) 20 MG tablet     gabapentin (NEURONTIN) 300 MG capsule     ibuprofen (ADVIL/MOTRIN) 800 MG tablet     Lidocaine (LIDOCARE) 4 % Patch     nicotine (NICORETTE) 4 MG lozenge     zolpidem (AMBIEN) 10 MG tablet     naloxone (NARCAN) 1 mg/mL for intranasal kit (2 syringes with 2 mucosal atomizer device)     No current facility-administered medications for this visit.             Allergies:     Allergies   Allergen Reactions     Diphenhydramine Other (See Comments)     Restless Legs/Feet  Other reaction(s): Restless Legs/Feet  Restless legs  Other reaction(s): Restless Legs/Feet       Methadone Other (See Comments)     Had terrible itching and redness and was pulled off methadone     Mirtazapine Other (See Comments)     Restless Legs/Feet  Other reaction(s): Restless Legs/Feet  Restless legs.  Other reaction(s): Restless Legs/Feet       Seasonal Allergies      Trazodone Other (See Comments) and Unknown     Behavioral Disturbances  Other reaction(s): Behavioral Disturbances  Behavorial disturbance.  - restless legs              Social History:     Social History     Socioeconomic History     Marital status: Single     Spouse name: Not on file     Number of children: Not on file     Years of education: Not on file     Highest education level: Not on file   Occupational History     Not on file   Social Needs     Financial resource strain: Not on file     Food insecurity:     Worry: Not on file     Inability: Not on file     Transportation needs:     Medical: Not on file     Non-medical: Not on file   Tobacco Use     Smoking status: Current Every Day Smoker     Packs/day: 0.25     Types:  Cigarettes     Smokeless tobacco: Current User     Types: Chew   Substance and Sexual Activity     Alcohol use: No     Drug use: Not Currently     Types: Opiates, Cocaine     Sexual activity: Yes     Partners: Female   Lifestyle     Physical activity:     Days per week: Not on file     Minutes per session: Not on file     Stress: Not on file   Relationships     Social connections:     Talks on phone: Not on file     Gets together: Not on file     Attends Nondenominational service: Not on file     Active member of club or organization: Not on file     Attends meetings of clubs or organizations: Not on file     Relationship status: Not on file     Intimate partner violence:     Fear of current or ex partner: Not on file     Emotionally abused: Not on file     Physically abused: Not on file     Forced sexual activity: Not on file   Other Topics Concern     Parent/sibling w/ CABG, MI or angioplasty before 65F 55M? No   Social History Narrative    Updated : 12/13/2019    Living Situation:  Rents an apartment in [place], lives alone or with 10 yo son when he visits    Marital status:     Children: two children, shared custody and pays child support    Employment/financial status: currently not working due to pain - working on [employer] and may return to work as  once pain is stable     Current recovery program (meetings, sponsorship): None currently         Exercise/activity: Walks frequently, no regular exercise plan    Diet/food security: Stable, able to access food    Transportation: Own vehicle        Legal history: Denies            Family History:     Family History   Problem Relation Age of Onset     Cancer Mother      Alcohol/Drug Father      Psychotic Disorder Father         anxiety     Asthma Maternal Grandmother      Psychotic Disorder Maternal Grandmother         anxiety     Heart Disease Maternal Grandfather      Circulatory Maternal Grandfather      Psychotic Disorder Maternal Grandfather         anxiety,  depression            Review of Systems:   GEN: See HPI  HEENT: No change in vision or hearing, mouth sores, dysphagia, lymph nodes  Resp: No shortness of breath, coughing, hx of asthma  CV: No chest pain, palpitations, syncope   GI: See HPI  : No dysuria, history of stones, urine color    Skin: No rash; no pruritus or psoriasis  MS: No arthralgias, myalgias, joint swelling  Neuro: No memory changes, confusion, numbness    Heme: No difficulty clotting, bruising, bleeding  Psych:  No anxiety, depression, agitation          Physical Exam:   VS:  /66   Pulse 82   Temp 98.1  F (36.7  C) (Oral)   Ht 1.829 m (6')   Wt 86 kg (189 lb 8 oz)   SpO2 96%   BMI 25.70 kg/m         Gen: A&Ox3, NAD, well developed  HEENT: non-icteric   CV: RRR, no overt murmurs  Lung: CTA Bilatererally, no wheezing or crackles.   Lym- no palpable lymphadenopathy  Abd: soft, NT, ND, no palpable splenomegaly, liver is not palpable.   Ext: no edema, intact pulses.   Skin: No rash,  no palmar erythema, telangiectasias or jaundice  Neuro: grossly intact, no asterixis   Psych: appropriate mood and affects         Data:   Reviewed in person and significant for:    Results for FAIZA BALLARD (MRN [id number]) as of 2/21/2020 09:33   Ref. Range 2/19/2020 15:37   Sodium Latest Ref Range: 133 - 144 mmol/L 140   Potassium Latest Ref Range: 3.4 - 5.3 mmol/L 4.2   Chloride Latest Ref Range: 94 - 109 mmol/L 109   Carbon Dioxide Latest Ref Range: 20 - 32 mmol/L 27   Urea Nitrogen Latest Ref Range: 7 - 30 mg/dL 18   Creatinine Latest Ref Range: 0.66 - 1.25 mg/dL 0.76   GFR Estimate Latest Ref Range: >60 mL/min/1.73_m2 >90   GFR Estimate If Black Latest Ref Range: >60 mL/min/1.73_m2 >90   Calcium Latest Ref Range: 8.5 - 10.1 mg/dL 8.9   Anion Gap Latest Ref Range: 3 - 14 mmol/L 4   Albumin Latest Ref Range: 3.4 - 5.0 g/dL 3.6   Protein Total Latest Ref Range: 6.8 - 8.8 g/dL 7.4   Bilirubin Total Latest Ref Range: 0.2 - 1.3 mg/dL 0.2   Alkaline  Phosphatase Latest Ref Range: 40 - 150 U/L 63   ALT Latest Ref Range: 0 - 70 U/L 40   AST Latest Ref Range: 0 - 45 U/L 20   Bilirubin Direct Latest Ref Range: 0.0 - 0.2 mg/dL 0.1   Glucose Latest Ref Range: 70 - 99 mg/dL 144 (H)   WBC Latest Ref Range: 4.0 - 11.0 10e9/L 8.7   Hemoglobin Latest Ref Range: 13.3 - 17.7 g/dL 14.8   Hematocrit Latest Ref Range: 40.0 - 53.0 % 45.2   Platelet Count Latest Ref Range: 150 - 450 10e9/L 376   RBC Count Latest Ref Range: 4.4 - 5.9 10e12/L 5.07   MCV Latest Ref Range: 78 - 100 fl 89   MCH Latest Ref Range: 26.5 - 33.0 pg 29.2   MCHC Latest Ref Range: 31.5 - 36.5 g/dL 32.7   RDW Latest Ref Range: 10.0 - 15.0 % 15.2 (H)   INR Latest Ref Range: 0.86 - 1.14  1.00       Again, thank you for allowing me to participate in the care of your patient.      Sincerely,    Nathan Christensen PA-C

## 2020-02-19 NOTE — PROGRESS NOTES
Hepatology Clinic Note  Edgar Williamson   Date of Birth 1980  Date of Service 2/19/2020    REASON FOR CONSULTATION: Hepatitis C  REFERRING PROVIDER: Abigail Deshpande MD          Assessment/plan:   Edgar Williamson is a 39 year old male with history of hepatitis C, unknown genotype and treatment naive. Currently there are no biochemical or physical signs of cirrhosis. He has relatively normal transaminases. His viral RNA level is pending at the time of visit today. We discussed the natural course of Hepatitis C virus and the benefits of treating the disease. We discussed the treatment regimen and medication side effects. Patient does have recent drug use and will be enrolling in chemical dependency treatment within the next week (this has been confirmed he is at [facility] in [place]). We discussed risk reduction strategies such as condom use with sexual activity and using clean needles and syringes. He declines supplies at this time and his motivated for sobriety. Patient would be a good candidate for Hepatitis C treatment to prevent worsening fibrosis, prevent extrahepatic manifestations and decrease the likelihood of transmission of the virus.     - Will plan to send prescription for Hepatitis C pending positive HCV RNA level, genotype and fibrosis  - Repeat HCV RNA at the end of treatment and 12-weeks after finishing treatment to determine SVR  - Will confirm enrollment in chemical dependency program  - Follow-up in Hepatology Clinic as needed    Nathan Christensen PA-C   [facility]    -----------------------------------------------------       HPI:   Edgar Williamson is a 39 year old male  presenting for evaluation and treatment of Hepatitis C.     Hepatitis C   -Genotype unknown  -Diagnosed: December 2019  -History: Hx of IVDU  -Prior biopsy: No   -Prior treatments: Naive    Patient states that he was first notified that he had hepatitis C after he received a letter after  donating plasma.   He states he likely acquired the virus through history of IV drug use he last used 2 weeks ago.  He will be going into treatment at [facility] within the next week.  Currently his appetite is decent.  He did experience some weight loss when using over the past 6 months.  He does have regular bowel movements.    Per chart review, patient did have a positive hepatitis C antibody in December 2019.  In August 2018, he had a nonreactive hepatitis C antibody.  He does not recall becoming sick or jaundice within the last 6 months.    Patient denies jaundice, lower extremity edema, abdominal distension or confusion.  Patient also denies melena, hematochezia or hematemesis. Patient denies weight loss, fevers, sweats or chills.    PMH: Anxiety, depression, history of nephrolithiasis, osteoarthritis     SMH: lithotripsy    Medications: See below    He is currently smoking 7 cigarettes a day as well as using smokeless tobacco.  He states he is cutting down.  He quit drinking alcohol in 2015 and states that he did have a history of alcohol abuse and at his peak was drinking 1.75 liter of vodka every few days.  He went through chemical dependency treatment at that time.  As stated earlier, he is awaiting placement with lodging through [facility] chemical dependency treatment.  He is currently living at a mental health facility called [facility].  He states that his uncle and aunt had cirrhosis likely due to heavy alcohol use.  No other known history of liver cancer.    Lab work-up thus far:   HCV RNA - 12,443 on 2/29/2020  HAV Ab  HBV SAb0.58  HBV SAg nonreactive  HBV CAb nonreactive  HIV nonreactive       Medical hx Surgical hx   Past Medical History:   Diagnosis Date     Anxiety      Benign meningioma of brain (H)      Depressive disorder      Moderate major depression (H) 1/17/2011     Primary osteoarthritis of lumbar spine 4/23/2019    No past surgical history on file.              Medications:      Current Outpatient Medications   Medication     ALPRAZolam (XANAX) 0.5 MG tablet     escitalopram (LEXAPRO) 20 MG tablet     gabapentin (NEURONTIN) 300 MG capsule     ibuprofen (ADVIL/MOTRIN) 800 MG tablet     Lidocaine (LIDOCARE) 4 % Patch     nicotine (NICORETTE) 4 MG lozenge     zolpidem (AMBIEN) 10 MG tablet     naloxone (NARCAN) 1 mg/mL for intranasal kit (2 syringes with 2 mucosal atomizer device)     No current facility-administered medications for this visit.             Allergies:     Allergies   Allergen Reactions     Diphenhydramine Other (See Comments)     Restless Legs/Feet  Other reaction(s): Restless Legs/Feet  Restless legs  Other reaction(s): Restless Legs/Feet       Methadone Other (See Comments)     Had terrible itching and redness and was pulled off methadone     Mirtazapine Other (See Comments)     Restless Legs/Feet  Other reaction(s): Restless Legs/Feet  Restless legs.  Other reaction(s): Restless Legs/Feet       Seasonal Allergies      Trazodone Other (See Comments) and Unknown     Behavioral Disturbances  Other reaction(s): Behavioral Disturbances  Behavorial disturbance.  - restless legs              Social History:     Social History     Socioeconomic History     Marital status: Single     Spouse name: Not on file     Number of children: Not on file     Years of education: Not on file     Highest education level: Not on file   Occupational History     Not on file   Social Needs     Financial resource strain: Not on file     Food insecurity:     Worry: Not on file     Inability: Not on file     Transportation needs:     Medical: Not on file     Non-medical: Not on file   Tobacco Use     Smoking status: Current Every Day Smoker     Packs/day: 0.25     Types: Cigarettes     Smokeless tobacco: Current User     Types: Chew   Substance and Sexual Activity     Alcohol use: No     Drug use: Not Currently     Types: Opiates, Cocaine     Sexual activity: Yes     Partners: Female   Lifestyle      Physical activity:     Days per week: Not on file     Minutes per session: Not on file     Stress: Not on file   Relationships     Social connections:     Talks on phone: Not on file     Gets together: Not on file     Attends Muslim service: Not on file     Active member of club or organization: Not on file     Attends meetings of clubs or organizations: Not on file     Relationship status: Not on file     Intimate partner violence:     Fear of current or ex partner: Not on file     Emotionally abused: Not on file     Physically abused: Not on file     Forced sexual activity: Not on file   Other Topics Concern     Parent/sibling w/ CABG, MI or angioplasty before 65F 55M? No   Social History Narrative    Updated : 12/13/2019    Living Situation:  Rents an apartment in [place] [facility], lives alone or with 10 yo son when he visits    Marital status:     Children: two children, shared custody and pays child support    Employment/financial status: currently not working due to pain - working on GED and may return to work as  once pain is stable     Current recovery program (meetings, sponsorship): None currently         Exercise/activity: Walks frequently, no regular exercise plan    Diet/food security: Stable, able to access food    Transportation: Own vehicle        Legal history: Denies            Family History:     Family History   Problem Relation Age of Onset     Cancer Mother      Alcohol/Drug Father      Psychotic Disorder Father         anxiety     Asthma Maternal Grandmother      Psychotic Disorder Maternal Grandmother         anxiety     Heart Disease Maternal Grandfather      Circulatory Maternal Grandfather      Psychotic Disorder Maternal Grandfather         anxiety, depression            Review of Systems:   GEN: See HPI  HEENT: No change in vision or hearing, mouth sores, dysphagia, lymph nodes  Resp: No shortness of breath, coughing, hx of asthma  CV: No chest pain, palpitations, syncope   GI:  See HPI  : No dysuria, history of stones, urine color    Skin: No rash; no pruritus or psoriasis  MS: No arthralgias, myalgias, joint swelling  Neuro: No memory changes, confusion, numbness    Heme: No difficulty clotting, bruising, bleeding  Psych:  No anxiety, depression, agitation          Physical Exam:   VS:  /66   Pulse 82   Temp 98.1  F (36.7  C) (Oral)   Ht 1.829 m (6')   Wt 86 kg (189 lb 8 oz)   SpO2 96%   BMI 25.70 kg/m        Gen: A&Ox3, NAD, well developed  HEENT: non-icteric   CV: RRR, no overt murmurs  Lung: CTA Bilatererally, no wheezing or crackles.   Lym- no palpable lymphadenopathy  Abd: soft, NT, ND, no palpable splenomegaly, liver is not palpable.   Ext: no edema, intact pulses.   Skin: No rash,  no palmar erythema, telangiectasias or jaundice  Neuro: grossly intact, no asterixis   Psych: appropriate mood and affects         Data:   Reviewed in person and significant for:    Results for FAIZA BALLARD (MRN [id number]) as of 2/21/2020 09:33   Ref. Range 2/19/2020 15:37   Sodium Latest Ref Range: 133 - 144 mmol/L 140   Potassium Latest Ref Range: 3.4 - 5.3 mmol/L 4.2   Chloride Latest Ref Range: 94 - 109 mmol/L 109   Carbon Dioxide Latest Ref Range: 20 - 32 mmol/L 27   Urea Nitrogen Latest Ref Range: 7 - 30 mg/dL 18   Creatinine Latest Ref Range: 0.66 - 1.25 mg/dL 0.76   GFR Estimate Latest Ref Range: >60 mL/min/1.73_m2 >90   GFR Estimate If Black Latest Ref Range: >60 mL/min/1.73_m2 >90   Calcium Latest Ref Range: 8.5 - 10.1 mg/dL 8.9   Anion Gap Latest Ref Range: 3 - 14 mmol/L 4   Albumin Latest Ref Range: 3.4 - 5.0 g/dL 3.6   Protein Total Latest Ref Range: 6.8 - 8.8 g/dL 7.4   Bilirubin Total Latest Ref Range: 0.2 - 1.3 mg/dL 0.2   Alkaline Phosphatase Latest Ref Range: 40 - 150 U/L 63   ALT Latest Ref Range: 0 - 70 U/L 40   AST Latest Ref Range: 0 - 45 U/L 20   Bilirubin Direct Latest Ref Range: 0.0 - 0.2 mg/dL 0.1   Glucose Latest Ref Range: 70 - 99 mg/dL 144 (H)   WBC  Latest Ref Range: 4.0 - 11.0 10e9/L 8.7   Hemoglobin Latest Ref Range: 13.3 - 17.7 g/dL 14.8   Hematocrit Latest Ref Range: 40.0 - 53.0 % 45.2   Platelet Count Latest Ref Range: 150 - 450 10e9/L 376   RBC Count Latest Ref Range: 4.4 - 5.9 10e12/L 5.07   MCV Latest Ref Range: 78 - 100 fl 89   MCH Latest Ref Range: 26.5 - 33.0 pg 29.2   MCHC Latest Ref Range: 31.5 - 36.5 g/dL 32.7   RDW Latest Ref Range: 10.0 - 15.0 % 15.2 (H)   INR Latest Ref Range: 0.86 - 1.14  1.00

## 2020-02-19 NOTE — NURSING NOTE
Chief Complaint   Patient presents with     New Patient     Hep C Virus     Blood pressure 109/66, pulse 82, temperature 98.1  F (36.7  C), temperature source Oral, height 1.829 m (6'), weight 86 kg (189 lb 8 oz), SpO2 96 %.    Tiana Canas, CMA

## 2020-02-20 LAB
HBV CORE AB SERPL QL IA: NONREACTIVE
HBV SURFACE AB SERPL IA-ACNC: 0.58 M[IU]/ML
HBV SURFACE AG SERPL QL IA: NONREACTIVE
HIV 1+2 AB+HIV1 P24 AG SERPL QL IA: NONREACTIVE

## 2020-02-21 LAB
HCV AB SERPL QL IA: REACTIVE
HCV RNA SERPL NAA+PROBE-ACNC: ABNORMAL [IU]/ML
HCV RNA SERPL NAA+PROBE-LOG IU: 4.1 LOG IU/ML

## 2020-02-25 ENCOUNTER — TELEPHONE (OUTPATIENT)
Dept: GASTROENTEROLOGY | Facility: CLINIC | Age: 40
End: 2020-02-25

## 2020-02-25 NOTE — TELEPHONE ENCOUNTER
Attempted to reach patient to discuss treatment program, no answer, message left requesting call back, number provided.

## 2020-02-25 NOTE — TELEPHONE ENCOUNTER
Vicky Orozco, looking to see if he has enrolled in the treatment program at this time, based on the notes it sounds like he was working on this at the time he was seen.    Thanks

## 2020-02-26 ENCOUNTER — PATIENT OUTREACH (OUTPATIENT)
Dept: GASTROENTEROLOGY | Facility: CLINIC | Age: 40
End: 2020-02-26

## 2020-02-26 NOTE — PROGRESS NOTES
Spoke with staff at [facility] who requested a JARET be faxed to discuss pt and confirm pt is currently receiving treatment there. JARET faxed and pt signed. JOSÉ MIGUEL Astudillo from [facility], confirmed that pt is currently there and is receiving chemical dependency treatment. Will notify [facility].

## 2020-02-27 LAB
HCV GENTYP SERPL NAA+PROBE: NORMAL
HCV RNA SERPL NAA+PROBE-ACNC: ABNORMAL [IU]/ML
HCV RNA SERPL NAA+PROBE-LOG IU: 4.1 LOG IU/ML

## 2020-02-27 NOTE — TELEPHONE ENCOUNTER
PA Initiation    Medication: Mavyret  Insurance Company: [employer] - Phone [phone] Fax [phone]  Pharmacy Filling the Rx: [facility] - [place], [place] - [facility] [address]  Filling Pharmacy Phone: [phone]  Filling Pharmacy Fax: [phone]  Start Date: 2/27/2020

## 2020-03-02 NOTE — TELEPHONE ENCOUNTER
Prior Authorization Approval    Authorization Effective Date: 2/27/2020  Authorization Expiration Date: 5/21/2020  Medication: Mavyret  Approved Dose/Quantity: 8 weeks  Reference #: [id number]   Insurance Company: [employer] - Phone [phone] Fax [phone]  Expected CoPay: $0     Which Pharmacy is filling the prescription (Not needed for infusion/clinic administered): [facility] - [place] [address] [address]  Pharmacy Notified: Yes  Patient Notified: Yes

## 2020-03-18 ENCOUNTER — TELEPHONE (OUTPATIENT)
Dept: FAMILY MEDICINE | Facility: CLINIC | Age: 40
End: 2020-03-18

## 2020-03-18 NOTE — TELEPHONE ENCOUNTER
Panel Management Review      Patient has the following on his problem list:     Depression / Dysthymia review    Measure:  Needs PHQ-9 score of 4 or less during index window.  Administer PHQ-9 and if score is 5 or more, send encounter to provider for next steps.    5 - 7 month window range: 12/3-4/1    PHQ-9 SCORE 5/16/2016 1/3/2017 8/1/2019   PHQ-9 Total Score - - -   PHQ-9 Total Score 0 5 10       If PHQ-9 recheck is 5 or more, route to provider for next steps.    Patient is due for:  PHQ9      Summary:    Patient is due/failing the following:   PHQ9    Action needed:   Patient needs to do PHQ9.    Type of outreach:    Phone, spoke to patient.  not a good time. requests call back anytime next week    Questions for provider review:    None                                                                                                                                    Savannah Calderon,        Chart routed to Care Team .

## 2020-03-23 NOTE — TELEPHONE ENCOUNTER
Panel Management Review  Summary:    Type of outreach:    Phone, spoke to patient.  Patient states he sees a psychiatrist and does the PHQ 9 on a regular basis.     Encounter routed to No Action Needed.                                                                                                                               Radha Villeags MA on 3/23/2020 at 11:57 AM

## 2020-03-24 NOTE — PROGRESS NOTES
SUBJECTIVE                                                    CC: Patient presents with:  Drug Problem    Edgar Williamson is a 39 year old male who presents to clinic today for follow up of Buprenorphine.      Date of last visit:  12/30/2019    Primary Care Provider: Charlee Hayes MD   [place] Board of Pharmacy Data Base Reviewed:    Yes; reviewed and no concerns for diversionary activity.  Will continue to monitor Gabapentin rx as he has received from two different providers.  Most recent data includes:  Per Care Everywhere patient received vicodin 5-325 mg tablet #6 today at [facility] pharmacy store #[id number] in [place].   03/16/2020 1 12/30/2019 Alprazolam 1 Mg Tablet #90.00 30 Co [place]  03/16/2020 1 12/30/2019 Zolpidem Tartrate 10 Mg Tablet #30.00 30 Co [place]  03/16/2020 1 11/21/2019 Gabapentin 300 Mg Capsule #90.00 30 Mo [place]aha  02/22/2020 2 02/22/2020 Gabapentin 300 Mg Capsule #30.00 10 Sa Joseph    Brief History:    Initial visit with me on 12/13/2019 when UDS was positive for amphetamines and methamphetamine.   History of opioid and alcohol use disorders, major depression, generalized anxiety, initial insomnia, and chronic pain secondary to osteoarthritis of lumbar spine.  No alcohol use since 2015.  Completed treatment at [facility] in 11/2015 after detox stay at [facility].  No hx seizures or DTs.  Abusing opioid pain pills since 2011.  Once he stopped drinking in 2015 he started using more opioid pain pills and self-medicating back pain using up to 60 mg oxycontin daily (smoking) and tried heroin once.  No hx IVDU.  Remote history of hallucinogen and cocaine use in 2009 and earlier.  Longest period of sobriety 4 years.  Hx of suboxone maintenance through [facility] for 4 months and was taking 16-4mg daily.      Psychiatric provider - Aurea Vasquez CP who manages his Lexapro 30 mg daily, Alprazolam 1 mg TID prn (not present in UDS - states he takes 2-3 times daily), Zolpidem 10 mg at HS (states  "he wants to get off this which was encouraged).      Hepatitis C Status -  08/02/2019 - Nonreactive (Care Everywhere [facility])      NICOTINE-cigarettes 5 daily and then e-cig throughout the day              Desire to quit - trying to quit cigarettes and using nicotine lozenges from PCP      Date of last use:   rx vicodin at 11AM yesterday per patient - states he threw the rest of the prescription in the garbage prior to returning to his sober house    2/8/2020 - heroin, alcohol methamphetamine     HPI:    3/25/2020  He reports using alcohol and methamphetamine and heroin in various amounts for about 2 months prior to admission to [facility].  He didn't have much money so he was using less than he otherwise would have and when asked about specific quantities he remained vague and evasive.  He was at [facility] for 2 days before starting CD treatment.  He graduated [facility] 28-day program and is now at [facility] since 3/18/2020.  He plans to stay at [facility] as long as recommended.  They are working on getting setup with video meetings.  He reports [facility] manages his medications.     Patient states he had a prescription for vicodin for tooth infection he received yesterday from the pharmacy and that he took one tablet then got rid of the rest before he got back to [facility].  He states he threw away the rest of the bottle because he got \"freaked out that I would get hooked on them.\"  Discussed that with the consent to communicate his medications will be discussed with [facility].  He also states that his Hep C medications are getting delivered to [facility] on Friday of this week for his first dose.     He refilled his alprazolam (xanax) 1 mg tablet refill from 12/2019 on 3/16/2020 and should have 3 weeks left of medications.  Discussed that a consent to communicate with his LADC at [facility] would be needed to provide safe and comprehensive care.  He " reports his psych provider at [facility] is no longer there and the wait to get into their other psych provider is > 2 months.  He is requesting writer take over his xanax, lexapro, and ambien.  He is aware that he will not be continued on xanax or ambien long-term and that I am happy to work with him in tapering off both medications.  He reports he is open to tapering off both medications.      Writer spoke with patient's LADC Noris (consent to communicate signed by patient today) and she reports patient has not shared any information about starting suboxone, taking vicodin, or the medications that he brought to the sober [place] yesterday.  The staff at [facility] manage his medications.  During office visit patient stated medications were not to be sent to [facility] as his paperwork from [facility] had indicated.  Noris states patient is supposed to have medication sent through [facility].  Noris believes patient has Alprazolam 1 mg tablets about #70 left at time of admission to [facility] on 3/18/2020 which would mean patient used #20 tablets in 2 days or had diverted them elsewhere.  She also reports patient is alleged to have diverted on tablet since admission to [facility].    Patient status since last visit: he reports improving, inconsistent report from his LADC at [facility]    Cravings: reports cravings for opioids related to back pain    MAT Dose: not currently on suboxone.     Side Effects: n/a     Cues to use and relapse triggers: back pain      Tobacco use - 4 cigarettes per day and has lozenges   Hepatitis C status - POSITIVE and will be starting tx this week     Social History:   Reviewed today.  See HPI for changes since last visit.     Problem list and histories reviewed & adjusted, as indicated.  Additional history: as documented    Patient Active Problem List    Diagnosis Date Noted     Primary osteoarthritis of lumbar spine 04/23/2019     Priority: Medium     Strain of lumbar region,  initial encounter 04/23/2019     Priority: Medium     Benign meningioma of brain (H)      Priority: Medium     Stasis dermatitis of both legs 12/22/2017     Priority: Medium     Trichiasis of right lower eyelid 08/03/2017     Priority: Medium     Eyelid laceration, right, initial encounter 03/02/2017     Priority: Medium     Altered mental status, unspecified 03/02/2017     Priority: Medium     Alcohol intoxication, with unspecified complication (H) 03/02/2017     Priority: Medium     Homeless single person 06/20/2016     Priority: Medium     Poisoning by benzodiazepines, undetermined, initial encounter 02/22/2016     Priority: Medium     Generalized anxiety disorder 09/28/2015     Priority: Medium     Diagnosis updated by automated process. Provider to review and confirm.       Alcohol abuse 09/14/2015     Priority: Medium     Lower urinary tract infectious disease 11/27/2014     Priority: Medium     Leukocytosis 11/27/2014     Priority: Medium     Nodule of right lung 11/26/2014     Priority: Medium     Overview:   Repeat CT chest recommended in 6 months, 12 months and 24 months       Kidney stone 11/26/2014     Priority: Medium     Opioid abuse, in remission (H) 03/07/2014     Priority: Medium     History of heroin abuse (H) 03/07/2014     Priority: Medium     Controlled substance agreement signed 08/14/2013     Priority: Medium     Overview:   Was getting alprazolam from an outside provider. Will not prescribe controlled substances for pt.        Seasonal allergies 03/12/2013     Priority: Medium     Insomnia 03/12/2013     Priority: Medium     Moderate major depression (H) 01/17/2011     Priority: Medium     CARDIOVASCULAR SCREENING; LDL GOAL LESS THAN 160 10/31/2010     Priority: Medium       Current Medications:  ALPRAZolam (XANAX) 0.5 MG tablet, Take 1 mg by mouth 3 times daily as needed   escitalopram (LEXAPRO) 20 MG tablet, Take 30 mg by mouth daily  gabapentin (NEURONTIN) 300 MG capsule, Take 1 capsule  (300 mg) by mouth 3 times daily  glecaprevir-pibrentasvir (MAVYRET) 100-40 MG per tablet, Take 3 tablets by mouth daily  ibuprofen (ADVIL/MOTRIN) 800 MG tablet, Take 1 tablet (800 mg) by mouth every 8 hours as needed for moderate pain  Lidocaine (LIDOCARE) 4 % Patch, Place 1 patch onto the skin every 24 hours To prevent lidocaine toxicity, patient should be patch free for 12 hrs daily.  naloxone (NARCAN) 1 mg/mL for intranasal kit (2 syringes with 2 mucosal atomizer device), In opioid overdose put cone in nostril and push 1/2 of contents into each nostril.  Repeat every 3 min if no response until help arrives. (Patient not taking: Reported on 2/19/2020)  nicotine (NICORETTE) 4 MG lozenge, Place 1 lozenge (4 mg) inside cheek as needed for smoking cessation  zolpidem (AMBIEN) 10 MG tablet, Take 1 tablet (10 mg) by mouth nightly as needed    No current facility-administered medications on file prior to visit.       Allergies   Allergen Reactions     Diphenhydramine Other (See Comments)     Restless Legs/Feet  Other reaction(s): Restless Legs/Feet  Restless legs  Other reaction(s): Restless Legs/Feet       Methadone Other (See Comments)     Had terrible itching and redness and was pulled off methadone     Mirtazapine Other (See Comments)     Restless Legs/Feet  Other reaction(s): Restless Legs/Feet  Restless legs.  Other reaction(s): Restless Legs/Feet       Seasonal Allergies      Trazodone Other (See Comments) and Unknown     Behavioral Disturbances  Other reaction(s): Behavioral Disturbances  Behavorial disturbance.  - restless legs         REVIEW OF SYSTEMS:  General:  No acute withdrawal symptoms.  No recent infections or fever  Eyes:  No vision concerns.  No double vision.    Resp: No coughing, wheezing or shortness of breath  CV: No chest pains or palpitations  GI: No nausea, vomiting, abdominal pain, diarrhea.  No constipation  : No urinary frequency or dysuria    Musculoskeletal: +++chronic back pain; No  significant muscle or joint pains other than as above.  No edema  Neurologic: No numbness, tingling, weakness, problems with balance or coordination  Psychiatric: No acute concerns other than as above.   Skin: No rashes or areas of acute infection      OBJECTIVE                                                    LABS:  Labs reviewed.  Urine tox results POS: opiates and benzodiazepines today.     Results for orders placed or performed in visit on 03/25/20   Urine Drugs of Abuse Screen Panel 13     Status: Abnormal   Result Value Ref Range    Cannabinoids (66-[id number]-9-[id number]-9-THC) Not Detected NDET^Not Detected ng/mL    Phencyclidine (Phencyclidine) Not Detected NDET^Not Detected ng/mL    Cocaine (Benzoylecgonine) Not Detected NDET^Not Detected ng/mL    Methamphetamine (d-Methamphetamine) Not Detected NDET^Not Detected ng/mL    Opiates (Morphine) Detected, Abnormal Result (A) NDET^Not Detected ng/mL    Amphetamine (d-Amphetamine) Not Detected NDET^Not Detected ng/mL    Benzodiazepines (Nordiazepam) Detected, Abnormal Result (A) NDET^Not Detected ng/mL    Tricyclic Antidepressants (Desipramine) Not Detected NDET^Not Detected ng/mL    Methadone (Methadone) Not Detected NDET^Not Detected ng/mL    Barbiturates (Butalbital) Not Detected NDET^Not Detected ng/mL    Oxycodone (Oxycodone) Not Detected NDET^Not Detected ng/mL    Propoxyphene (Norpropoxyphene) Not Detected NDET^Not Detected ng/mL    Buprenorphine (Buprenorphine) Not Detected NDET^Not Detected ng/mL       PHYSICAL EXAM:  /64   Pulse 84   Temp 97.6  F (36.4  C) (Oral)   Resp 16   Ht 1.829 m (6')   Wt 97.8 kg (215 lb 8 oz)   SpO2 97%   BMI 29.23 kg/m      GENERAL APPEARANCE:  alert, comfortable appearing and flushed  EYES:Eyes grossly normal to inspection  NEURO:  Gait normal.  No tremor. Coordination intact.     MENTAL STATUS EXAM:  Appearance:  awake, alert, well groomed and no apparent distress  Attitude:  evasive and guarded  Mood:  good  Affect:   mood congruent and guarded  Psychomotor Behavior:  no evidence of tardive dyskinesia, dystonia, or tics  Thought Content:  no evidence of suicidal ideation or homicidal ideation, no evidence of psychotic thought, no auditory hallucinations present and no visual hallucinations present  Insight:  fair  Judgement:  fair  Oriented to:  time, person, and place      ASSESSMENT/PLAN                                                      (F11.20) Opioid use disorder, severe, dependence (H) with chronic pain (primary encounter diagnosis)  (F10.20) Alcohol use disorder, severe, in a controlled environment (H)  (F19.10) Polysubstance abuse (H)  Comment: Last opioid use reported as years ago.  UDS positive for opioids as expected per patient reported use of vicodin.   Plan: Start buprenorphine-naloxone 2-0.5mg; place 1 film under the tongue once daily #7.  Patient aware that suboxone dose will not be increased until xanax is significantly decreased and/or discontinued.     Opiate confirm quant urine add-on lab today.     Continue to encourage complete abstinence from all illicit substances, alcohol, and prescription opioids.  Encouraged patient to be open and honest about his use, behaviors, and treatment center staff.    Continue CD treatment through [facility] as currently enrolled including continued work with sponsor virtually.      By history - (F41.1) Generalized anxiety disorder  By history - (F32.1) Moderate major depression (H)  Comment: Reports mood stable and anxiety improved with lexapro since started about 10 months ago ago. Wants to transfer psychiatric medications to writer from [facility] as his provider is no longer working there.   Plan: Consent to communicate signed by patient for [facility] and Noris MARTINES at [facility].     Patient will be tapered off xanax due to diversionary activity and inconsistent medication history reports and concern for misuse of his prescription xanax.     Taper  instructions sent to [facility] include take Xanax 1 mg BID for 1 week, then take Xanax 0.5 mg BID for 1 week, then take xanax 0.5 mg daily for 1 week then stop and destroy all remaining medication.     Patient has gabapentin 300 mg he takes TID which will assist in any potential withdrawal symptoms during above taper.       (Z79.899) High risk medication use  Comment: Suboxone 2-0.5mg daily  Plan: Routine UDS, monitor for cravings and any return to use       ENCOUNTER FOR LONG TERM USE OF HIGH RISK MEDICATION   High Risk Drug Monitoring?  YES   Drug being monitored: Buprenorphine   Reason for drug: Opioid use disorder   What is being monitored?: Dosage, Cravings, Trigger, side effects, and continued abstinence.      Follow-up: 1 week via TELEPHONE visit       Patient counseling completed today:  Counseled the patient on the importance of having a recovery program in addition to Suboxone maintenance.  Also discussed having a sober support network, not isolating, being open, honest, and willing to change, avoiding triggers and managing cravings.  In addition, abstinence from alcohol, benzodiazepines, THC, opioids and other drugs of abuse was recommended.      Risk of overdose following a period of abstinence due to decrease tolerance was discussed including risk of death.  Risk of overdose if using Suboxone with other substances particuarly benzodiazepines, alcohol, gabapentin, or other CNS depressants was reviewed.        Linda Benoit CNP  [facility] - Addiction Medicine  [facility]  [phone]

## 2020-03-25 ENCOUNTER — OFFICE VISIT (OUTPATIENT)
Dept: ADDICTION MEDICINE | Facility: CLINIC | Age: 40
End: 2020-03-25
Payer: COMMERCIAL

## 2020-03-25 VITALS
BODY MASS INDEX: 29.19 KG/M2 | WEIGHT: 215.5 LBS | SYSTOLIC BLOOD PRESSURE: 118 MMHG | TEMPERATURE: 97.6 F | HEIGHT: 72 IN | OXYGEN SATURATION: 97 % | RESPIRATION RATE: 16 BRPM | DIASTOLIC BLOOD PRESSURE: 64 MMHG | HEART RATE: 84 BPM

## 2020-03-25 DIAGNOSIS — F32.1 MODERATE MAJOR DEPRESSION (H): ICD-10-CM

## 2020-03-25 DIAGNOSIS — F10.20 ALCOHOL USE DISORDER, SEVERE, IN CONTROLLED ENVIRONMENT (H): ICD-10-CM

## 2020-03-25 DIAGNOSIS — F11.20 OPIOID USE DISORDER, SEVERE, DEPENDENCE (H): Primary | ICD-10-CM

## 2020-03-25 DIAGNOSIS — F41.1 GENERALIZED ANXIETY DISORDER: ICD-10-CM

## 2020-03-25 DIAGNOSIS — F19.10 POLYSUBSTANCE ABUSE (H): ICD-10-CM

## 2020-03-25 DIAGNOSIS — Z79.899 HIGH RISK MEDICATION USE: ICD-10-CM

## 2020-03-25 LAB
AMPHETAMINES UR QL: NOT DETECTED NG/ML
BARBITURATES UR QL SCN: NOT DETECTED NG/ML
BENZODIAZ UR QL SCN: ABNORMAL NG/ML
BUPRENORPHINE UR QL: NOT DETECTED NG/ML
CANNABINOIDS UR QL: NOT DETECTED NG/ML
COCAINE UR QL SCN: NOT DETECTED NG/ML
D-METHAMPHET UR QL: NOT DETECTED NG/ML
METHADONE UR QL SCN: NOT DETECTED NG/ML
OPIATES UR QL SCN: ABNORMAL NG/ML
OXYCODONE UR QL SCN: NOT DETECTED NG/ML
PCP UR QL SCN: NOT DETECTED NG/ML
PROPOXYPH UR QL: NOT DETECTED NG/ML
TRICYCLICS UR QL SCN: NOT DETECTED NG/ML

## 2020-03-25 PROCEDURE — 99000 SPECIMEN HANDLING OFFICE-LAB: CPT | Performed by: NURSE PRACTITIONER

## 2020-03-25 PROCEDURE — 80361 OPIATES 1 OR MORE: CPT | Mod: 90 | Performed by: NURSE PRACTITIONER

## 2020-03-25 PROCEDURE — 99214 OFFICE O/P EST MOD 30 MIN: CPT | Performed by: NURSE PRACTITIONER

## 2020-03-25 PROCEDURE — 80306 DRUG TEST PRSMV INSTRMNT: CPT | Performed by: NURSE PRACTITIONER

## 2020-03-25 RX ORDER — BUPRENORPHINE AND NALOXONE 2; .5 MG/1; MG/1
1 FILM, SOLUBLE BUCCAL; SUBLINGUAL DAILY
Qty: 7 FILM | Refills: 0 | Status: SHIPPED | OUTPATIENT
Start: 2020-03-25 | End: 2020-04-22

## 2020-03-25 RX ORDER — PENICILLIN V POTASSIUM 500 MG/1
TABLET, FILM COATED ORAL
COMMUNITY
Start: 2020-03-24 | End: 2020-05-06

## 2020-03-25 ASSESSMENT — MIFFLIN-ST. JEOR: SCORE: 1920.5

## 2020-03-25 NOTE — PATIENT INSTRUCTIONS
Start taking suboxone 2-0.5 mg under the tongue once daily.     We will start tapering your benzodiazepines when you are due for a refill.

## 2020-03-26 ENCOUNTER — TELEPHONE (OUTPATIENT)
Dept: ADDICTION MEDICINE | Facility: CLINIC | Age: 40
End: 2020-03-26

## 2020-03-26 RX ORDER — ALPRAZOLAM 1 MG
TABLET ORAL
COMMUNITY
Start: 2020-03-16 | End: 2020-04-15

## 2020-03-26 RX ORDER — ZOLPIDEM TARTRATE 10 MG/1
10 TABLET ORAL
Qty: 30 TABLET | Refills: 0 | COMMUNITY
Start: 2020-03-26 | End: 2020-04-15

## 2020-03-26 NOTE — TELEPHONE ENCOUNTER
Writer called Laurita Kearney this morning regarding xanax.  Taper instructions provided via telephone and faxed at this time as well.  I am not managing patient's doxepin.  Patient not forthcoming about medications on several occasions.

## 2020-03-26 NOTE — TELEPHONE ENCOUNTER
Reason for Call:  Clarification    Detailed comments: Rick nuno [place] nurse had called stating she would like a call back. She has some questions about patients medication and the taper.    Tel: [phone]

## 2020-03-26 NOTE — TELEPHONE ENCOUNTER
Comfort from [facility] called again. She wanted to know if Linda Benoit CNP was managing the patient's Doxepin 25 mg 1-2 tabs PRN at .  I told her I don't see that med on his med list and it is not managed by Linda.    They are still looking for feedback on Xanax dosing. I advised them we will be in contact when we get that information.

## 2020-03-26 NOTE — TELEPHONE ENCOUNTER
"I spoke to Lynn at [facility]. She was concerned that patient is on Xanax and Suboxone. I advised her we are aware and working to taper him off Xanax.     He is taking Xanax 1 mg TID. Currently he has 62 tabs. Lynn noted that one of patient's Xanax went missing this weekend. She stated they hand patient's their pill bottles and they are supposed to show them what they took. She accuses Edgar of taking and hiding an extra tab, which he denies. He was put on a behavior plan which states they will discharge him if any more of his medications go missing.     Lynn is asking for specific plans on what to do with patient's Xanax. \"Is the taper starting now?\" She will not dispense anymore Xanax until she hears back from Linda Benoit CNP per their policy.  "

## 2020-03-26 NOTE — TELEPHONE ENCOUNTER
I called Laurita Kearney again. Comfort asked if Linda was willing to take over prescribing all of the patient's meds. I told her she would not do that and they needed to consult the patient's primary care and other providers about Edgar's other meds.  Comfort said she would let Edgar know this.

## 2020-03-27 ENCOUNTER — CARE COORDINATION (OUTPATIENT)
Dept: GASTROENTEROLOGY | Facility: CLINIC | Age: 40
End: 2020-03-27

## 2020-03-27 DIAGNOSIS — B18.2 CHRONIC HEPATITIS C WITHOUT HEPATIC COMA (H): Primary | ICD-10-CM

## 2020-03-27 NOTE — LETTER
March 27, 2020       TO: Edgar Williamson  [address] [facility] [address]       Dear ,      Hepatitis C Treatment    Treatment: Mavyret x 8 weeks    Please have labs drawn as close to the date indicated at the [facility] or [facility].    Start Date: 03/27/20    Week 8-End of Treatment  HCV RNA Quant Lab due: 5/22/2020  Please have this lab drawn on or within a week after this date 5/22/2020. You will need to repeat this lab 3 months after completing treatment to ensure you have cleared the virus. Please see date for the final lab below. You do not need to fast for this lab.     3 Months Post Treatment  HCV RNA Quant Lab due: 8/20/2020  Please have this lab drawn on the specified date of 8/20/2020 or within a week after. This final lab will determine if you have cleared the Hepatitis C virus with Mavyret treatment. Without this final lab, we will be unable to determine if treatment was successful. You do not need to fast for this lab.     Educational information to patient on Hep C treatment;    -Contact the [facility] Hepatology clinic and speak with clinical RN prior to starting any new prescribed or OTC medications.     -Take medications exactly as prescribed, do not change dose or stop taking without consulting your provider.   -Take Medication one time each day with or without food  -If you miss a dose of medication, then take it as soon as you remember on the same day. If not remembered on the same day, then skip the dose and take the next dose at the usual time. Do not take more than the recommended dose. Contact the clinic if you miss a dose.    Please contact the pharmacy 1-2 weeks prior to needing a medication refill.      Side Effects  The most common side effects of Hep C medication treatment can include:  -tiredness  -headache  -nausea  Notify the clinic of any side effects that bother you or that do not go away.   Possible side effects have been discussed.   Patient has  been instructed to clinic for rash, itching or unmanageable nausea.    How to store Hep C Treatment Medications  -Store Medication at room temperature below 86 degrees F  -Keep Medication in it's original container  -Do not use Medication if the seal is broken or missing    General information  It is not known if treatment will prevent you from infecting another person or reinfecting yourself with the hepatitis C virus during treatment. It is best that as soon as you start treatment to buy a new toothbrush, disposable razors (if you use a rotating shaver you do not need to buy a new one) and nail clippers. If you wear dentures, you should soak your dentures in 70-90% Isopropyl solution for 5 minutes one time within the first week of starting treatment. After dentures are done soaking, rinse your dentures off thoroughly with water. If you check your blood sugar at home, please dispose of the fingerstick needle after each use and DO NOT REUSE the insulin needles. These items should not be shared with anyone.        If you have any questions, please contact the main clinic at [phone] or your clinical RN at [phone]. We appreciate you choosing the [facility] for your treatment.     Deneen Rhodes RN Care Coordinator   [facility]  Hepatology Clinic/Specialty Program

## 2020-03-27 NOTE — TELEPHONE ENCOUNTER
Spoke with patient.  Writer informed patient that nursing had informed [facility] nursing at [facility] would not be able to take over prescribing of all of his medications.  Patient states he was informed that Linda spoke with nursing staff regarding his taper.  Writer informed patient that Linda did in fact discuss Xanax taper with [facility].  Patient frustrated, stating he never asked for refills of all of his medications from Linda.  Patient stated he would like to have a conference call with [facility] and [facility] today.  States he wants to adjust his consent to communicate to only be allowed when he is involved on a conference call.  Writer informed patient that we will discuss this with provider and get back to him as soon as we can.    Routing to provider for review.    Cathleen Burger, RN    Nurse Liaison  [facility]    Addiction Medicine Services

## 2020-03-27 NOTE — TELEPHONE ENCOUNTER
Reason for Call:  Other call back- Medications and taper clarification     Detailed comments: Patient would like to talk to Linda IZAGUIRRE Or the nurse regarding the conversation about his medication and the taper. Pt. Is confused and would like to know what was said between the nurses at [facility] and the nurses for Addiction medicine. Please call patient back to discuss taper and doses of medications.     Phone Number Patient can be reached at: Home number on file [phone] (home)    Best Time: Any     Can we leave a detailed message on this number? Not Applicable    Call taken on 3/27/2020 at 8:41 AM by Alea Dodge

## 2020-03-27 NOTE — PROGRESS NOTES
Connected with patient for f/u on Hep C treatment delivery/start status. Patient received their Mavyret medication and are ready to start treatment. Patient will be on Hep C treatment for 8 weeks. Patient reports no recent changes in health, hospitalizations or recent changes in medications. Patient did discuss with a pharmacist. Reviewed the following Hep C POC and education with patient.     Hepatitis C Treatment  Treatment: Mavyret x 8 weeks  Genotype: 3  Stage Fibrosis: unknown (no biochemical or physical signs of cirrhosis)  Previous Treatment Outcome: Naive    Please have labs drawn as close to the date indicated at the [facility] or [facility].    Start Date: 03/27/20    Week 8-End of Treatment  HCV RNA Quant Lab due: 5/22/2020  Please have this lab drawn on or within a week after this date 5/22/2020. You will need to repeat this lab 3 months after completing treatment to ensure you have cleared the virus. Please see date for the final lab below. You do not need to fast for this lab.     3 Months Post Treatment  HCV RNA Quant Lab due: 8/20/2020  Please have this lab drawn on the specified date of 8/20/2020 or within a week after. This final lab will determine if you have cleared the Hepatitis C virus with Mavyret treatment. Without this final lab, we will be unable to determine if treatment was successful. You do not need to fast for this lab.     Educational information to patient on Hep C treatment;     -Contact the [facility] Hepatology clinic and speak with clinical RN prior to starting any new prescribed or OTC medications.   -Take medications exactly as prescribed, do not change dose or stop taking without consulting your provider.   -Take Medication one time each day with or without food  -If you miss a dose of medication, then take it as soon as you remember on the same day. If not remembered on the same day, then skip the dose and take the next dose at the usual time. Do not take more than the recommended  dose. Contact the clinic if you miss a dose.    Please contact the pharmacy 1-2 weeks prior to needing a medication refill.      Side Effects  The most common side effects of Hep C medication treatment can include:  -tiredness  -headache  -nausea  Notify the clinic of any side effects that bother you or that do not go away.   Possible side effects have been discussed.   Patient has been instructed to clinic for rash, itching or unmanageable nausea.    How to store Hep C Treatment Medications  -Store Medication at room temperature below 86 degrees F  -Keep Medication in it's original container  -Do not use Medication if the seal is broken or missing    General information  It is not known if treatment will prevent you from infecting another person or reinfecting yourself with the hepatitis C virus during treatment. It is best that as soon as you start treatment to buy a new toothbrush, disposable razors (if you use a rotating shaver you do not need to buy a new one) and nail clippers. If you wear dentures, you should soak your dentures in 70-90% Isopropyl solution for 5 minutes one time within the first week of starting treatment. After dentures are done soaking, rinse your dentures off thoroughly with water. If you check your blood sugar at home, please dispose of the fingerstick needle after each use and DO NOT REUSE the insulin needles. These items should not be shared with anyone.        If you have any questions, please contact the main clinic at [phone] or your clinical RN at [phone]. We appreciate you choosing the [facility] clinic for your treatment. Patient agrees to Hep C treatment POC and verbalizes understanding. Patient will receive a copy of treatment plan in the mail, address verified with patient. Patient has no further questions or concerns. Hep C care team updated on patient status.      Deneen Rhodes RN Care Coordinator   [facility]  Hepatology Clinic/Specialty Program

## 2020-03-27 NOTE — TELEPHONE ENCOUNTER
Writer attempted to call patient at this time to schedule a conference call between him and [facility] staff to address his questions about medications per his request.  Left VM requesting a call back.     Called his personal cell number on file and no answer.  If patient calls back please TRANSFER the call to me if possible in order to prevent phone tag.

## 2020-03-30 LAB
6MAM SERPL-MCNC: NEGATIVE NG/ML
CODEINE UR CFM-MCNC: NEGATIVE NG/ML
MORPHINE UR CFM-MCNC: NEGATIVE NG/ML

## 2020-03-30 NOTE — TELEPHONE ENCOUNTER
Per Linda Benoit, if patient contacts clinic inform him [facility] staff will need to be available during telephone appt 4/1/20. Medications to be discussed at that time.    Gisel Quiñones RN on 3/30/2020 at 1:51 PM

## 2020-04-01 NOTE — PROGRESS NOTES
Received a voicemail from Comfort Plascencia from pt's treatment center requesting writer call back to discuss pt. Attempted to reach Comfort, home answer, message left requesting call back, number provided.

## 2020-04-06 ENCOUNTER — TELEPHONE (OUTPATIENT)
Dept: ADDICTION MEDICINE | Facility: CLINIC | Age: 40
End: 2020-04-06

## 2020-04-06 NOTE — TELEPHONE ENCOUNTER
Spoke with [facility] and they confirm they did send a prescription for patient's xanax 1 mg BID that he called them on 3/19/2020 requesting.  The rx was not picked up and has since been cancelled by [facility] staff per their report.     They also stated that patient will be seen for an intake by Dr. Cody and that xanax will not be continued, thus patient should continue current taper schedule as written.  Unable to reach patient for an update on above.  Left him a VM stating he will be continued on the taper and can call with any questions.  The remaining extra xanax will be destroyed by [facility] staff.

## 2020-04-06 NOTE — TELEPHONE ENCOUNTER
Returned call from [facility] RN and informed her that patient's extra alprazolam is to be destroyed.  He is only to be given enough to complete his taper schedule.      No further follow up needed.

## 2020-04-06 NOTE — TELEPHONE ENCOUNTER
Writer called patient and he states there have been several miscommunications about his medications and that he is not allowed to work according to [facility] policies to reduce risk of COVID-19 exposure.  He discussed his concerns with [facility]  and he is being kicked out and is transitioning to [facility].     He states he doesn't want to continue suboxone and instead wants to return to [facility] and continue alprazolam 1 mg BID.  He is not interested in further taper despite it being advised by writer.     He verbalized authorization (in addition to existing consent to communicate on file for [facility]) for writer to verify his appointment with them for 4/14/2020 and their most recent xanax prescription sig.  If able to verify that information patient authorized writer to fax orders for xanax to [facility] nursing staff for him to continue taking xanax at dosing consistent with most recent Rx from [place].

## 2020-04-06 NOTE — TELEPHONE ENCOUNTER
Writer was able to verify patient's appointment with [facility] on 4/14/2020 2:40 PM.  They were not able to share his medication information and the nurse was not available to assist with a request to verify his rx on file for xanax 1 mg BID.      Attempted to contact [facility] regarding patient's xanax but no answer.  Left  with writer's number for the nurse.

## 2020-04-06 NOTE — TELEPHONE ENCOUNTER
Comfort VLADEZ from [facility] had called wanting to speak with Linda and would like a call back as soon as possible.  Tel: [phone]

## 2020-04-06 NOTE — TELEPHONE ENCOUNTER
Reason for Call:  Call back    Detailed comments: Patient does not know what to do he states there was a mix up with his taper that he did not know about - patient is now being kicked out of of [facility] he has until 5 pm today to get out.  Patient would like to know if there is anything Linda is able to do to help medication wise because [facility] will not give him his remaining medication when he leaves they are going to destroy it instead.    Patient also stated if the nurse or Linda contact [facility] he would like to be apart of the call.    Phone Number Patient can be reached at: Home number on file [phone] (home)    Best Time: Anytime    Can we leave a detailed message on this number? YES    Call taken on 4/6/2020 at 9:57 AM by Shweta Lopez

## 2020-04-06 NOTE — TELEPHONE ENCOUNTER
Routing to provider for review.    Cathleen Burger, RN    Nurse Liaison  [facility]    Addiction Medicine Services

## 2020-04-06 NOTE — TELEPHONE ENCOUNTER
Patient returned writer's call and was informed that his taper will be continued and that Dr. Cody will not be continuing his xanax.  He was given the opportunity to ask further questions but declined and was instead tangential and verbalizing frustration about [facility].    No further follow up needed at this time.  Writer will not be changing xanax taper or continuing patient on xanax.  He is aware that he must reach out to [facility] and was recommended to start individual therapy to assist in managing anxiety symptoms.

## 2020-04-09 NOTE — PROGRESS NOTES
Called pt to check in since starting Mavyret. Pt denied experiencing any symptoms and reported compliance with medication. Discussed that end of treatment labs are due around 5/22/2020 but pt may be instructed to postpone or skip these labs depending on Covid-19. Will follow up with pt prior to 5/22. Pt verbalized understanding and is agreeable to plan.

## 2020-04-10 ENCOUNTER — TRANSFERRED RECORDS (OUTPATIENT)
Dept: HEALTH INFORMATION MANAGEMENT | Facility: CLINIC | Age: 40
End: 2020-04-10

## 2020-04-15 ENCOUNTER — TELEPHONE (OUTPATIENT)
Dept: ADDICTION MEDICINE | Facility: CLINIC | Age: 40
End: 2020-04-15

## 2020-04-15 ENCOUNTER — VIRTUAL VISIT (OUTPATIENT)
Dept: ADDICTION MEDICINE | Facility: CLINIC | Age: 40
End: 2020-04-15
Payer: COMMERCIAL

## 2020-04-15 DIAGNOSIS — F11.20 OPIOID USE DISORDER, SEVERE, DEPENDENCE (H): Primary | ICD-10-CM

## 2020-04-15 PROCEDURE — 99214 OFFICE O/P EST MOD 30 MIN: CPT | Mod: 95 | Performed by: NURSE PRACTITIONER

## 2020-04-15 RX ORDER — PROPRANOLOL HYDROCHLORIDE 80 MG/1
80 CAPSULE, EXTENDED RELEASE ORAL 2 TIMES DAILY
COMMUNITY
End: 2020-10-16

## 2020-04-15 RX ORDER — BUPRENORPHINE AND NALOXONE 2; .5 MG/1; MG/1
1 FILM, SOLUBLE BUCCAL; SUBLINGUAL DAILY
Qty: 7 FILM | Refills: 0 | Status: SHIPPED | OUTPATIENT
Start: 2020-04-15 | End: 2020-04-22

## 2020-04-15 NOTE — PROGRESS NOTES
UDS reports received from [facility] staff via fax which show UDS results from 4/10/2020 + benzodiazepines which is expected given patient recently completing alprazolam taper.  Negative for all other substances including opiates, oxycodone, PCP, amphetamines, methamphetamine, cocaine, and cannabis.     Will continue to monitor UDS through [facility] UDS results.

## 2020-04-15 NOTE — TELEPHONE ENCOUNTER
Faxed UA from [facility] received and given to Linda Benoit for review.    Gisel Quiñones RN on 4/15/2020 at 2:58 PM

## 2020-04-15 NOTE — TELEPHONE ENCOUNTER
Reason for Call:  Other Message to provider    Detailed comments: FYI - Patient there should be a fax that should have come over from Gina at [facility] regarding patient's UA results (that he took last Friday). States their last name is Vencil.    Phone Number Patient can be reached at: Cell number on file:    Telephone Information:   Mobile [phone]       Best Time: no preferred time    Can we leave a detailed message on this number? YES    Call taken on 4/15/2020 at 2:19 PM by Azra Weems

## 2020-04-15 NOTE — PROGRESS NOTES
"[facility] ADDICTION MEDICINE  VIDEO Progress Note                                                      SUBJECTIVE                                                    Edgar Williamson is a 39 year old male who is being evaluated via a billable VIDEO visit due to COVID-19 pandemic in order to reduce potential unnecessary exposure to the virus.       Telemedicine Visit: The patient's condition can be safely assessed and treated via synchronous audio and visual telemedicine encounter.      The patient has been notified of following:     \"This video visit will be conducted via a call between you and your physician/provider. We have found that certain health care needs can be provided without the need for an in-person physical exam.  This service lets us provide the care you need with a video conversation.  If a prescription is necessary we can send it directly to your pharmacy.  If lab work is needed we can place an order for that and you can then stop by our lab to have the test done at a later time.    If during the course of the call the physician/provider feels a video visit is not appropriate, you will not be charged for this service.\"     Patient has given verbal consent for Video visit? Yes    Patient would like the video invitation sent by: Send to e-mail at: [email or web address]    Ewa Mims MA (MA signature)    Video Start Time: 8:39 AM    Edgar Williamson complains of    Chief Complaint   Patient presents with     Addiction Problem       Date of last visit:  03/25/2020    Primary Care Provider: Charlee Hayes MD   [place] Board of Pharmacy Data Base Reviewed:    Yes; reviewed and no concerns for diversionary activity.  Most recent data includes:  03/25/2020   2     03/25/2020   Suboxone 2 Mg-0.5 Mg Sl Film         7.00  7  Kr Abhijit   [id number]   Gra ([id number])   0/0  2.00 mg  [place]icaid   [place]   03/24/2020   1     03/24/2020   Hydrocodone-Acetamin 5-325 Mg         6.00  2  Ga Bha   [id number]  "  Sup ([id number])   0/0  15.00 MME [place]   03/16/2020   2     12/30/2019   Alprazolam 1 Mg Tablet         90.00  30  Co O'd   [id number]   Gra ([id number])   2/2  6.00 LME [place]   03/16/2020   2     12/30/2019   Zolpidem Tartrate 10 Mg Tablet         30.00  30  Co O'd    [id number]   Gra ([id number])   2/2  0.50 LME [place]   03/16/2020   2     11/21/2019   Gabapentin 300 Mg Capsule         90.00  30  [place]      Brief History:    Initial visit with me on 12/13/2019 when UDS was positive for amphetamines and methamphetamine.   History of opioid and alcohol use disorders, major depression, generalized anxiety, initial insomnia, and chronic pain secondary to osteoarthritis of lumbar spine.  No alcohol use since 2015.  Completed treatment at [facility] in 11/2015 after detox stay at [facility].  No hx seizures or DTs.  Abusing opioid pain pills since 2011.  Once he stopped drinking in 2015 he started using more opioid pain pills and self-medicating back pain using up to 60 mg oxycontin daily (smoking) and tried heroin once.  No hx IVDU.  Remote history of hallucinogen and cocaine use in 2009 and earlier.  Longest period of sobriety 4 years.  Hx of suboxone maintenance through [facility] for 4 months and was taking 16-4mg daily.      Psychiatric provider - Aurea Vasquez CP who manages his Lexapro 30 mg daily, Alprazolam 1 mg TID prn (not present in UDS - states he takes 2-3 times daily), Zolpidem 10 mg at HS (states he wants to get off this which was encouraged).      Hepatitis C Status -  08/02/2019 - Nonreactive (Care Everywhere [facility])      NICOTINE-cigarettes 5 daily and then e-cig throughout the day              Desire to quit - trying to quit cigarettes and using nicotine lozenges from PCP    Date of last use:   2/8/2020 - heroin, alcohol methamphetamine    HPI:    4/15/2020  Patient is at [facility] sober housing since last Monday at [facility] and states he loves it.  He has random UDS at [facility] and at his sober  house.  Discussed needing UDS results from sober house prior to sending in his suboxone prescription given his tendency to misrepresent himself.  Patient verbalized an understanding and will have his , Maurizio Russell call or fax his UDS results to our clinic.  Patient aware that his rx will only be sent once these results are received.  He reports he finished his alprazolam taper and is no longer taking ambien.  He will continue psych care at [facility] with Dr. Cody and has another appointment in about a month.  He reports he is tolerating anxiety alright, better than he has in the past when tapered off.  Sleep okay but getting better since finishing ambien and alprazolam.  He started inderal ER 80 mg BID last night with improvement in anxiety and sleep.       Patient reports he wants to restart suboxone now that he is not on alprazolam and plans to return to work.  He reports his back pain gets worse with more activity and thus wants to resume suboxone.  Describes back pain as aches with occasional radiating pain a few times a week down right buttock.  Lidocaine patch at night which are helpful.      Patient status since last visit: improving    Cravings: increased with pain    MAT Dose: N/A    Side Effects: none.      Cues to use and relapse triggers: None      Tobacco use - smoking about 1 cigarette in the AM and vaping 12 mg nicotine and using throughout the day, using nicotine lozenges as well     Hepatitis C status - chronic hep C currently undergoing     I have reviewed and updated the patient's Past Medical History, Social History, Family History and Medication List.    Patient Active Problem List    Diagnosis Date Noted     Primary osteoarthritis of lumbar spine 04/23/2019     Priority: Medium     Strain of lumbar region, initial encounter 04/23/2019     Priority: Medium     Benign meningioma of brain (H)      Priority: Medium     Stasis dermatitis of both legs 12/22/2017     Priority: Medium      Trichiasis of right lower eyelid 08/03/2017     Priority: Medium     Eyelid laceration, right, initial encounter 03/02/2017     Priority: Medium     Altered mental status, unspecified 03/02/2017     Priority: Medium     Alcohol intoxication, with unspecified complication (H) 03/02/2017     Priority: Medium     Homeless single person 06/20/2016     Priority: Medium     Poisoning by benzodiazepines, undetermined, initial encounter 02/22/2016     Priority: Medium     Generalized anxiety disorder 09/28/2015     Priority: Medium     Diagnosis updated by automated process. Provider to review and confirm.       Alcohol abuse 09/14/2015     Priority: Medium     Lower urinary tract infectious disease 11/27/2014     Priority: Medium     Leukocytosis 11/27/2014     Priority: Medium     Nodule of right lung 11/26/2014     Priority: Medium     Overview:   Repeat CT chest recommended in 6 months, 12 months and 24 months       Kidney stone 11/26/2014     Priority: Medium     Opioid abuse, in remission (H) 03/07/2014     Priority: Medium     History of heroin abuse (H) 03/07/2014     Priority: Medium     Controlled substance agreement signed 08/14/2013     Priority: Medium     Overview:   Was getting alprazolam from an outside provider. Will not prescribe controlled substances for pt.        Seasonal allergies 03/12/2013     Priority: Medium     Insomnia 03/12/2013     Priority: Medium     Moderate major depression (H) 01/17/2011     Priority: Medium     CARDIOVASCULAR SCREENING; LDL GOAL LESS THAN 160 10/31/2010     Priority: Medium       Current Medications:  ALPRAZolam (XANAX) 1 MG tablet, Starting 3/26/2020 Take 1 mg BID for 1 week, then 0.5 mg BID for 1 week then take 0.5 mg daily for one week then stop.  Left over tablets to be destroyed by treatment center staff.   buprenorphine HCl-naloxone HCl (SUBOXONE) 2-0.5 MG per film, Place 1 Film under the tongue daily  escitalopram (LEXAPRO) 20 MG tablet, Take 30 mg by mouth  daily  gabapentin (NEURONTIN) 300 MG capsule, Take 1 capsule (300 mg) by mouth 3 times daily  glecaprevir-pibrentasvir (MAVYRET) 100-40 MG per tablet, Take 3 tablets by mouth daily  ibuprofen (ADVIL/MOTRIN) 800 MG tablet, Take 1 tablet (800 mg) by mouth every 8 hours as needed for moderate pain  Lidocaine (LIDOCARE) 4 % Patch, Place 1 patch onto the skin every 24 hours To prevent lidocaine toxicity, patient should be patch free for 12 hrs daily.  naloxone (NARCAN) 1 mg/mL for intranasal kit (2 syringes with 2 mucosal atomizer device), In opioid overdose put cone in nostril and push 1/2 of contents into each nostril.  Repeat every 3 min if no response until help arrives.  nicotine (NICORETTE) 4 MG lozenge, Place 1 lozenge (4 mg) inside cheek as needed for smoking cessation  penicillin V (VEETID) 500 MG tablet,   zolpidem (AMBIEN) 10 MG tablet, Take 1 tablet (10 mg) by mouth nightly as needed    No current facility-administered medications on file prior to visit.       REVIEW OF SYSTEMS:  General:  No acute withdrawal symptoms.  No recent infections or fever  Eyes/ENT:  No vision concerns.  No double vision.    Resp: No coughing, wheezing or shortness of breath  CV: No chest pains or palpitations  GI: No nausea, vomiting, abdominal pain, diarrhea.  No constipation  : No urinary frequency or dysuria    Musculoskeletal: No significant muscle or joint pains other than as above.  No edema  Neurologic: No numbness, tingling, weakness, problems with balance or coordination  Psychiatric: No acute concerns other than as above.   Skin: No rashes or areas of acute infection    OBJECTIVE                                                    LABS:  Labs reviewed. No UDS collected as patient evaluated over telephone visit.     PHYSICAL EXAM:  GENERAL APPEARANCE:  alert, comfortable appearing  EYES:Eyes grossly normal to inspection  NEURO:  Gait normal.  No tremor. Coordination intact.     MENTAL STATUS EXAM:  Appearance:  awake,  alert, adequately groomed, no apparent distress and neatly groomed  Attitude:  cooperative  Mood:  better  Affect:  appropriate and in normal range and mood congruent  Psychomotor Behavior:  no evidence of tardive dyskinesia, dystonia, or tics  Thought Content:  no evidence of suicidal ideation or homicidal ideation, no evidence of psychotic thought, no auditory hallucinations present and no visual hallucinations present  Insight:  fair  Judgement:  fair  Oriented to:  time, person, and place    ASSESSMENT/PLAN                                                      (F11.20) Opioid use disorder, severe, dependence (H) with chronic pain (primary encounter diagnosis)  (F10.20) Alcohol use disorder, severe, in a controlled environment (H)  (F19.10) Polysubstance abuse (H)  Comment: Last opioid use prior to [facility] admission.  Rx opioids for tooth pain about a month ago.  Limited insight into severity of opioid use.  Opioid abuse driven by chronic back pain from osteoarthritis.  Hx of suboxone maintenance in the past through [place].   Plan: Start buprenorphine-naloxone 2-0.5mg; place 1 film under the tongue once daily #7.  Patient aware that suboxone rx will be sent only after his UDS results are received from his sober , Maurizio Russell.     Continue to encourage complete abstinence from all illicit substances, alcohol, and prescription opioids.  Encouraged patient to be open and honest about his use, behaviors, and treatment center staff.     Continue CD treatment through [facility] treatment with sober living as currently enrolled including continued work with sponsor virtually.     Continue close follow up to ensure treatment adherence and safe use of suboxone as patient has a history of frequently changing his mind about starting suboxone after receiving the medication.  He states now that he is off alprazolam he would like to resume suboxone for pain as back pain has driven his opioid abuse in the  past.      By history - (F41.1) Generalized anxiety disorder  By history - (F32.1) Moderate major depression (H)  Comment: Reports mood and anxiety improved with lexapro and recently started propranolol ER for anxiety.  Completed benzo taper and ambien.   Plan: Consent to communicate signed by patient for [facility] and will continue receiving care with Dr. Cody at [place].   Continue lexapro 30 mg daily & propranolol ER 80 mg BID through [facility].      Continue taking gabapentin 300 mg he takes TID through PCP      (Z79.777) High risk medication use  Comment: Suboxone 2-0.5mg daily  Plan: Routine UDS through [facility] and [facility], monitor for cravings and any return to use       ENCOUNTER FOR LONG TERM USE OF HIGH RISK MEDICATION   High Risk Drug Monitoring?  YES   Drug being monitored: Buprenorphine   Reason for drug: Opioid use disorder   What is being monitored?: Dosage, Cravings, Trigger, side effects, and continued abstinence.      Follow-up: 1 week via VIDEO visit.     Patient counseling completed today:  Counseled the patient on the importance of having a recovery program in addition to Suboxone maintenance.  Also discussed having a sober support network, not isolating, being open, honest, and willing to change, avoiding triggers and managing cravings.      In addition, abstinence from alcohol, benzodiazepines, THC, opioids and other drugs of abuse was recommended.  Risk of overdose following a period of abstinence due to decrease tolerance was discussed including risk of death.  Risk of overdose if using Suboxone with other substances particuarly benzodiazepines, alcohol, gabapentin, or other CNS depressants was reviewed.      Video-Visit Details    Type of service:  Video Visit    Video End Time (time video stopped): 8:54 AM    Originating Location (pt. Location): Other [facility] facility ([facility])    Distant Location (provider location):  [place] ADDICTION MEDICINE [facility]inic    Mode of Communication:  Video Conference via [employer]      As the provider I attest to compliance with applicable laws and regulations related to telemedicine and that the above documentation accurately summarizes the assessments, treatment plan, and patient education completed during this visit.         Linda Benoit CNP  [facility] - Addiction Medicine  [facility]  [phone]

## 2020-04-22 ENCOUNTER — VIRTUAL VISIT (OUTPATIENT)
Dept: ADDICTION MEDICINE | Facility: CLINIC | Age: 40
End: 2020-04-22
Payer: COMMERCIAL

## 2020-04-22 DIAGNOSIS — Z79.899 HIGH RISK MEDICATION USE: ICD-10-CM

## 2020-04-22 DIAGNOSIS — K59.03 DRUG INDUCED CONSTIPATION: ICD-10-CM

## 2020-04-22 DIAGNOSIS — F10.20 ALCOHOL USE DISORDER, SEVERE, IN CONTROLLED ENVIRONMENT (H): ICD-10-CM

## 2020-04-22 DIAGNOSIS — F19.10 POLYSUBSTANCE ABUSE (H): ICD-10-CM

## 2020-04-22 DIAGNOSIS — F11.20 OPIOID USE DISORDER, SEVERE, DEPENDENCE (H): Primary | ICD-10-CM

## 2020-04-22 PROCEDURE — 99214 OFFICE O/P EST MOD 30 MIN: CPT | Mod: 95 | Performed by: NURSE PRACTITIONER

## 2020-04-22 RX ORDER — BUPRENORPHINE AND NALOXONE 2; .5 MG/1; MG/1
1 FILM, SOLUBLE BUCCAL; SUBLINGUAL 2 TIMES DAILY
Qty: 16 FILM | Refills: 0 | Status: SHIPPED | OUTPATIENT
Start: 2020-04-22 | End: 2020-04-29

## 2020-04-22 RX ORDER — POLYETHYLENE GLYCOL 3350 17 G/17G
17 POWDER, FOR SOLUTION ORAL DAILY PRN
Qty: 1 BOTTLE | Refills: 3 | Status: SHIPPED | OUTPATIENT
Start: 2020-04-22 | End: 2020-08-07

## 2020-04-22 NOTE — PROGRESS NOTES
"[facility] ADDICTION MEDICINE  VIDEO Progress Note                                                      SUBJECTIVE                                                    Edgar Williamson is a 39 year old male who is being evaluated via a billable VIDEO visit due to COVID-19 pandemic in order to reduce potential unnecessary exposure to the virus.       Telemedicine Visit: The patient's condition can be safely assessed and treated via synchronous audio and visual telemedicine encounter.      The patient has been notified of following:     \"This video visit will be conducted via a call between you and your physician/provider. We have found that certain health care needs can be provided without the need for an in-person physical exam.  This service lets us provide the care you need with a video conversation.  If a prescription is necessary we can send it directly to your pharmacy.  If lab work is needed we can place an order for that and you can then stop by our lab to have the test done at a later time.    If during the course of the call the physician/provider feels a video visit is not appropriate, you will not be charged for this service.\"     Patient has given verbal consent for Video visit? Yes    Patient would like the video invitation sent by: Send to e-mail at: [email or web address]    Stacy Sandifer, MA (MA signature)    Video Start Time: 11:43 AM    Edgar Williamson complains of    Chief Complaint   Patient presents with     Drug Problem       Date of last visit:  4/15/2020  BUPRENORPHINE FOLLOW UP: current dose suboxone 2-0.5 mg once daily.     Primary Care Provider: Charlee Hayes MD   [place] Board of Pharmacy Data Base Reviewed:    Yes; reviewed and no concerns for diversionary activity.  Most recent data includes:  04/15/2020 3 04/15/2020 Suboxone 2 Mg-0.5 Mg Sl Film  7.00 7 Kr Abhijit  04/14/2020 3 03/24/2020 Gabapentin 300 Mg Capsule  90.00 30 Isaac Carpio    Brief History:    Initial " visit with me on 12/13/2019 when UDS was positive for amphetamines and methamphetamine.  History of opioid and alcohol use disorders, major depression, generalized anxiety, initial insomnia, and chronic pain secondary to osteoarthritis of lumbar spine.  No alcohol use since 2015.  Completed treatment at [facility] in 11/2015 after detox stay at [facility].  No hx seizures or DTs.  Abusing opioid pain pills since 2011.  Once he stopped drinking in 2015 he started using more opioid pain pills and self-medicating back pain using up to 60 mg oxycontin daily (smoking) and tried heroin a few times.  No hx IVDU.  History of hallucinogen and cocaine use in 2009 and earlier.  Longest period of sobriety 4 years.  Hx of suboxone maintenance through [facility] for 4 months and was taking 16-4mg daily.      Psychiatric provider - Dr Cody at [facility] who manages his Lexapro 30 mg daily, propranolol ER 80 mg BID.     Hepatitis C Status -  02/19/2020 reactive and HCV RNA quant 12,443 international unit(s)/mL (currently receiving hep C treatment on week 5 of 8 weeks).  Then may have repeat labs.  At week 12 he will have repeat HCV.  He receives weekly calls asking if he is taking the medication daily.       Date of last use:   2/8/2020 - heroin, alcohol, methamphetamine    HPI:    4/22/2020  Patient status since last visit: improving    Cravings: denies    MAT Dose: adequate    Side Effects: suboxone-induced constipation, still able to pass BM but harder than usual    Cues to use and relapse triggers: None    Patient has been volunteering with [employer].  He reports increased back pain with more movement.  He states suboxone is lasting about half the day and controlling his back pain.  His pain has led to his substance use.  He is allowed to leave to go to work interviews.  He isn't allowed to have visitors or to go on overnights but this hasn't been a problem.      He reports waking less during the  night and is able to fall asleep much easier then before while taking ambien.  He reports being more active during the day which has helped his mood and sleep.  He has a UDS scheduled for tomorrow at [facility].     Tobacco use - one cigarette daily and unchanged amount of vaping daily      I have reviewed and updated the patient's Past Medical History, Social History, Family History and Medication List.    Patient Active Problem List    Diagnosis Date Noted     Primary osteoarthritis of lumbar spine 04/23/2019     Priority: Medium     Strain of lumbar region, initial encounter 04/23/2019     Priority: Medium     Benign meningioma of brain (H)      Priority: Medium     Stasis dermatitis of both legs 12/22/2017     Priority: Medium     Trichiasis of right lower eyelid 08/03/2017     Priority: Medium     Eyelid laceration, right, initial encounter 03/02/2017     Priority: Medium     Altered mental status, unspecified 03/02/2017     Priority: Medium     Alcohol intoxication, with unspecified complication (H) 03/02/2017     Priority: Medium     Homeless single person 06/20/2016     Priority: Medium     Poisoning by benzodiazepines, undetermined, initial encounter 02/22/2016     Priority: Medium     Generalized anxiety disorder 09/28/2015     Priority: Medium     Diagnosis updated by automated process. Provider to review and confirm.       Alcohol abuse 09/14/2015     Priority: Medium     Lower urinary tract infectious disease 11/27/2014     Priority: Medium     Leukocytosis 11/27/2014     Priority: Medium     Nodule of right lung 11/26/2014     Priority: Medium     Overview:   Repeat CT chest recommended in 6 months, 12 months and 24 months       Kidney stone 11/26/2014     Priority: Medium     Opioid abuse, in remission (H) 03/07/2014     Priority: Medium     History of heroin abuse (H) 03/07/2014     Priority: Medium     Controlled substance agreement signed 08/14/2013     Priority: Medium     Overview:   Was getting  alprazolam from an outside provider. Will not prescribe controlled substances for pt.        Seasonal allergies 03/12/2013     Priority: Medium     Insomnia 03/12/2013     Priority: Medium     Moderate major depression (H) 01/17/2011     Priority: Medium     CARDIOVASCULAR SCREENING; LDL GOAL LESS THAN 160 10/31/2010     Priority: Medium       Current Medications:  buprenorphine HCl-naloxone HCl (SUBOXONE) 2-0.5 MG per film, Place 1 Film under the tongue daily  buprenorphine HCl-naloxone HCl (SUBOXONE) 2-0.5 MG per film, Place 1 Film under the tongue daily  escitalopram (LEXAPRO) 20 MG tablet, Take 30 mg by mouth daily  gabapentin (NEURONTIN) 300 MG capsule, Take 1 capsule (300 mg) by mouth 3 times daily  glecaprevir-pibrentasvir (MAVYRET) 100-40 MG per tablet, Take 3 tablets by mouth daily  ibuprofen (ADVIL/MOTRIN) 800 MG tablet, Take 1 tablet (800 mg) by mouth every 8 hours as needed for moderate pain  Lidocaine (LIDOCARE) 4 % Patch, Place 1 patch onto the skin every 24 hours To prevent lidocaine toxicity, patient should be patch free for 12 hrs daily.  naloxone (NARCAN) 1 mg/mL for intranasal kit (2 syringes with 2 mucosal atomizer device), In opioid overdose put cone in nostril and push 1/2 of contents into each nostril.  Repeat every 3 min if no response until help arrives.  nicotine (NICORETTE) 4 MG lozenge, Place 1 lozenge (4 mg) inside cheek as needed for smoking cessation  penicillin V (VEETID) 500 MG tablet,   propranolol ER (INDERAL LA) 80 MG 24 hr capsule, Take 80 mg by mouth 2 times daily    No current facility-administered medications on file prior to visit.         REVIEW OF SYSTEMS:  General:  No acute withdrawal symptoms.  No recent infections or fever  Eyes/ENT:  No vision concerns.  No double vision.    Resp: No coughing, wheezing or shortness of breath  CV: No chest pains or palpitations  GI: No nausea, vomiting, abdominal pain, diarrhea.  +++ constipation  : No urinary frequency or dysuria     Musculoskeletal: No significant muscle or joint pains other than as above.  No edema  Neurologic: No numbness, tingling, weakness, problems with balance or coordination  Psychiatric: No acute concerns other than as above.   Skin: No rashes or areas of acute infection      OBJECTIVE                                                    LABS:  Labs reviewed. No UDS collected as patient evaluated over telephone visit.  Patient to have UDS sent via fax tomorrow after completed at [facility].     PHYSICAL EXAM:  GENERAL APPEARANCE:  alert, comfortable appearing and fatigued  EYES:Eyes grossly normal to inspection  NEURO:  Gait normal.  No tremor. Coordination intact.     MENTAL STATUS EXAM:  Appearance:  awake, alert, cooperative and no apparent distress  Attitude:  cooperative  Mood:  better  Affect:  appropriate and in normal range and mood congruent  Psychomotor Behavior:  no evidence of tardive dyskinesia, dystonia, or tics  Thought Content:  no evidence of suicidal ideation or homicidal ideation, no evidence of psychotic thought, no auditory hallucinations present and no visual hallucinations present  Insight:  limited  Judgement:  fair  Oriented to:  time, person, and place    ASSESSMENT/PLAN                                                      (F11.20) Opioid use disorder, severe, dependence (H) with chronic pain  (primary encounter diagnosis)  Comment: hx of pain pill use and a few episodes of heroin use.  Last UDS still + for benzodiazepines as he had just finished his alprazolam taper.  Hx of frequently changing his mind about medication plan.  Recent gabapentin 300 mg cap fill by Dr. Carpio when was previously getting from his PCP Dr. Hayes.  Will continue to monitor  regularly at visits for signs of diversionary activity.   Plan: Increase buprenorphine HCl-naloxone HCl (SUBOXONE) 2-0.5        MG per film; place 1 film under the tongue twice daily. #16    Patient to take UDS tomorrow at [facility] and will have results  faxed to our clinic for review.      Continue with CD treatment at [facility], volunteering while adhering to social distancing, and looking for employment to continue to structure his time.     (F10.20) Alcohol use disorder, severe, in controlled environment (H)  (F19.10) Polysubstance abuse (H)  Comment: hx stimulant, alcohol and pain pill/heroin use.  Hx long-term rx benzodiazepine use without reported abuse or misuse.    Plan: Continue with CD treatment at [facility] with sober living as well as sponsorship.    Continue with complete abstinence including refraining from prescription or illicit benzodiazepine use.     (K59.03) Drug induced constipation  Comment: suboxone-induced, some discomfort with BM but able to void  Plan: Start taking polyethylene glycol (MIRALAX) 17 GM/SCOOP         Powder; take 17 g daily as needed for constipation.  Take regularly once daily until regular BMs then use daily PRN.     (Z79.899) High risk medication use  Comment: suboxone  Plan: UDS per treatment center and sober house, monitor for cravings and return to use as well as pain control         ENCOUNTER FOR LONG TERM USE OF HIGH RISK MEDICATION   High Risk Drug Monitoring?  YES   Drug being monitored: Buprenorphine   Reason for drug: Opioid use disorder   What is being monitored?: Dosage, Cravings, Trigger, side effects, and continued abstinence.      Follow-up:  2 weeks via video visit.     Patient counseling completed today:  Counseled the patient on the importance of sending his UDS tomorrow for verification of his substance use and to continue to have open communication about any use.         Video-Visit Details    Type of service:  Video Visit    Video End Time (time video stopped): 12:04 PM    Originating Location (pt. Location): Other sober living ([facility])    Distant Location (provider location):  [place] ADDICTION MEDICINE [facility]    Mode of Communication:  Video Conference via [place]      As the  provider I attest to compliance with applicable laws and regulations related to telemedicine and that the above documentation accurately summarizes the assessments, treatment plan, and patient education completed during this visit.         Linda Benoit CNP  [facility] - Addiction Medicine  [facility] - [facility]  [phone]

## 2020-04-27 DIAGNOSIS — F17.200 SMOKER: ICD-10-CM

## 2020-04-27 NOTE — TELEPHONE ENCOUNTER
".   Requested Prescriptions   Pending Prescriptions Disp Refills     nicotine (NICORETTE) 4 MG lozenge 168 lozenge 3     Sig: Place 1 lozenge (4 mg) inside cheek as needed for smoking cessation       Partial Cholinergic Nicotinic Agonist Agents Passed - 4/27/2020  3:28 PM        Passed - Blood pressure under 140/90 in past 12 months     BP Readings from Last 3 Encounters:   03/25/20 118/64   02/19/20 109/66   12/20/19 110/60                 Passed - Recent (12 mo) or future (30 days) visit within the authorizing provider's specialty     Patient has had an office visit with the authorizing provider or a provider within the authorizing providers department within the previous 12 mos or has a future within next 30 days. See \"Patient Info\" tab in inbasket, or \"Choose Columns\" in Meds & Orders section of the refill encounter.              Passed - Medication is active on med list        Passed - Patient is 18 years of age or older           "

## 2020-04-27 NOTE — TELEPHONE ENCOUNTER
Last Written Prescription Date:  9-6-19  Last Fill Quantity: 168,  # refills: 3   Last office visit: 12/16/2019 with prescribing provider:  12-16-19   Future Office Visit:

## 2020-04-28 ENCOUNTER — TELEPHONE (OUTPATIENT)
Dept: ADDICTION MEDICINE | Facility: CLINIC | Age: 40
End: 2020-04-28

## 2020-04-28 DIAGNOSIS — F11.20 OPIOID USE DISORDER, SEVERE, DEPENDENCE (H): ICD-10-CM

## 2020-04-28 NOTE — TELEPHONE ENCOUNTER
Reason for Call:  Lab results    Detailed comments: Patient had called wanting to know if consuelo is able to call Teo @ [phone] regarding his lab results from the place he is at.    Phone Number Patient can be reached at: Home number on file [phone] (home)    Best Time: Anytime    Can we leave a detailed message on this number? YES    Call taken on 4/28/2020 at 10:43 AM by Shweta Lopez

## 2020-04-29 ENCOUNTER — TELEPHONE (OUTPATIENT)
Dept: FAMILY MEDICINE | Facility: CLINIC | Age: 40
End: 2020-04-29

## 2020-04-29 DIAGNOSIS — M47.816 PRIMARY OSTEOARTHRITIS OF LUMBAR SPINE: ICD-10-CM

## 2020-04-29 RX ORDER — BUPRENORPHINE AND NALOXONE 2; .5 MG/1; MG/1
1 FILM, SOLUBLE BUCCAL; SUBLINGUAL 2 TIMES DAILY
Qty: 16 FILM | Refills: 0 | Status: SHIPPED | OUTPATIENT
Start: 2020-04-29 | End: 2020-05-06

## 2020-04-29 RX ORDER — GABAPENTIN 300 MG/1
300 CAPSULE ORAL 4 TIMES DAILY
Qty: 360 CAPSULE | Refills: 3 | Status: SHIPPED | OUTPATIENT
Start: 2020-04-29 | End: 2020-10-29

## 2020-04-29 NOTE — TELEPHONE ENCOUNTER
"Writer spoke with Teo per patient request/with patient consent.      Last UDS on 4/24/2020 negative for all substances other than buprenorphine.  Eto states that patient has been \"passed\" all UDS since admission to [facility].  All UDS are observed by staff per Teo.  Teo also mentioned that he is happy to complete a UDS if requested by writer at any time it is needed.    Spoke with patient and buprenorphine rx sent to pharmacy at this time to last until appt on 5/6/2020 @ 11:30 AM.   Last PU on 4/22/2020 for suboxone 2-0.5 mg BID #16        "

## 2020-04-29 NOTE — TELEPHONE ENCOUNTER
Reason for Call:  Other    Detailed comments: Patient said he spoke , he/ never got a call from Jayla Benoit. Patient requests she contact him with his UA results. Patient can't get medication(s) refilled until the  gets patient's lab results.      Call taken on 4/29/2020 at 8:54 AM by Azra Weems

## 2020-04-29 NOTE — TELEPHONE ENCOUNTER
Reason for Call:  Medication or medication refill:    Do you use a [facility]?  Name of the pharmacy and phone number for the current request:  [facility] in [place] (pended)    Name of the medication requested: gabapentin (NEURONTIN)    Other request: patient is wishing to increase his dose due to back pain. He is currently taking 300mg 3x/day    Can we leave a detailed message on this number? YES    Phone number patient can be reached at: Home number on file [phone] (home)    Best Time: anytime    Call taken on 4/29/2020 at 12:08 PM by Anuj Lopez

## 2020-04-29 NOTE — TELEPHONE ENCOUNTER
Notified patient of the orders below.    Patient stated understanding and agreeable with the plan of care. Azra GonzalezRN,BSN, [facility] Triage.

## 2020-05-05 NOTE — PROGRESS NOTES
"[facility] ADDICTION MEDICINE  VIDEO Progress Note                                                      SUBJECTIVE                                                    Edgar Williamson is a 40 year old male who is being evaluated via a billable VIDEO visit due to COVID-19 pandemic in order to reduce potential unnecessary exposure to the virus.       Telemedicine Visit: The patient's condition can be safely assessed and treated via synchronous audio and visual telemedicine encounter.      The patient has been notified of following:     \"This video visit will be conducted via a call between you and your physician/provider. We have found that certain health care needs can be provided without the need for an in-person physical exam.  This service lets us provide the care you need with a video conversation. If a prescription is necessary we can send it directly to your pharmacy. If lab work is needed we can place an order for that and you can then stop by our lab to have the test done at a later time.    Video visits are billed at different rates depending on your insurance coverage.  Please reach out to your insurance provider with any questions.    If during the course of the call the physician/provider feels a video visit is not appropriate, you will not be charged for this service.\"    Patient has given verbal consent for Video visit? Yes    How would you like to obtain your AVS? E-Mail (inform patient AVS not encrypted) - [email or web address]    Patient would like the video invitation sent by: Text to cell phone: [phone]     Will anyone else be joining your video visit? No      Gamaliel Kong ([place] signature)    Edgar Williamson complains of  Addiction Problem     Date of last visit:  4/28/2020  BUPRENORPHINE FOLLOW UP: current dose suboxone 2-0.5 mg BID.     Primary Care Provider: Charlee Hayes MD   [place] Board of Pharmacy Data Base Reviewed:    Yes; reviewed and no concerns for " diversionary activity.  Most recent data includes:  04/29/2020   3     04/29/2020   Suboxone 2 Mg-0.5 Mg Sl Film         16.00  8  Kr Abhijit   [id number]   Gra (0350)   0/0  4.00 mg  Medicaid   [place]   04/29/2020   3     04/29/2020   Gabapentin 300 Mg Capsule         120.00  30  [place]      Brief History:    Initial visit with me on 12/13/2019 when UDS was positive for amphetamines and methamphetamine.  History of opioid and alcohol use disorders, major depression, generalized anxiety, initial insomnia, and chronic pain secondary to osteoarthritis of lumbar spine.  No alcohol use since 2015.  Completed treatment at [facility] in 11/2015 after detox stay at [facility].  No hx seizures or DTs.  Abusing opioid pain pills since 2011.  Once he stopped drinking in 2015 he started using more opioid pain pills and self-medicating back pain using up to 60 mg oxycontin daily (smoking) and tried heroin a few times.  No hx IVDU.  History of hallucinogen and cocaine use in 2009 and earlier.  Longest period of sobriety 4 years.  Hx of suboxone maintenance through [facility] for 4 months and was taking 16-4mg daily.      Psychiatric provider - Dr Cody at [facility] who manages his Lexapro 30 mg daily, propranolol ER 80 mg BID.      Hepatitis C Status -  02/19/2020 reactive and HCV RNA quant 12,443 international unit(s)/mL (currently receiving hep C treatment on week 5 of 8 weeks).  Then may have repeat labs.  At week 12 he will have repeat HCV.  He receives weekly calls asking if he is taking the medication daily.      Date of last use:   2/8/2020 - heroin, alcohol, methamphetamine    HPI:    5/6/2020  Patient status since last visit: unchanged    Cravings: denies    MAT Dose: adequate    Side Effects: constipation managed with fiber supplement    Cues to use and relapse triggers: unmanaged pain    He reports things are going well at [employer].  His back pain has improved with suboxone dose increase 2 weeks ago at his last visit.  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  Occasional back pain during the day that is manageable.  He reports taking public transit and limited jobs due to COVID have delayed finding a job.  He is hoping to do  work when/as able.  He had a UDS Saturday at the sober house and will likely have them more freuquently as several housemates have been relapsing.  He denies use, cravings or interest in using drugs even with his peers relapsing.  Mood stable and hopeful about his recovery.     Tobacco use - one cigarette daily and unchanged amount of vaping     I have reviewed and updated the patient's Past Medical History, Social History, Family History and Medication List.    Patient Active Problem List    Diagnosis Date Noted     Primary osteoarthritis of lumbar spine 04/23/2019     Priority: Medium     Strain of lumbar region, initial encounter 04/23/2019     Priority: Medium     Benign meningioma of brain (H)      Priority: Medium     Stasis dermatitis of both legs 12/22/2017     Priority: Medium     Trichiasis of right lower eyelid 08/03/2017     Priority: Medium     Eyelid laceration, right, initial encounter 03/02/2017     Priority: Medium     Altered mental status, unspecified 03/02/2017     Priority: Medium     Alcohol intoxication, with unspecified complication (H) 03/02/2017     Priority: Medium     Homeless single person 06/20/2016     Priority: Medium     Poisoning by benzodiazepines, undetermined, initial encounter 02/22/2016     Priority: Medium     Generalized anxiety disorder 09/28/2015     Priority: Medium     Diagnosis updated by automated process. Provider to review and confirm.       Alcohol abuse 09/14/2015     Priority: Medium     Lower urinary tract infectious disease 11/27/2014     Priority: Medium     Leukocytosis 11/27/2014     Priority: Medium     Nodule of right lung 11/26/2014     Priority: Medium      Overview:   Repeat CT chest recommended in 6 months, 12 months and 24 months       Kidney stone 11/26/2014     Priority: Medium     Opioid abuse, in remission (H) 03/07/2014     Priority: Medium     History of heroin abuse (H) 03/07/2014     Priority: Medium     Controlled substance agreement signed 08/14/2013     Priority: Medium     Overview:   Was getting alprazolam from an outside provider. Will not prescribe controlled substances for pt.        Seasonal allergies 03/12/2013     Priority: Medium     Insomnia 03/12/2013     Priority: Medium     Moderate major depression (H) 01/17/2011     Priority: Medium     CARDIOVASCULAR SCREENING; LDL GOAL LESS THAN 160 10/31/2010     Priority: Medium       Current Medications:  buprenorphine HCl-naloxone HCl (SUBOXONE) 2-0.5 MG per film, Place 1 Film under the tongue 2 times daily  escitalopram (LEXAPRO) 20 MG tablet, Take 30 mg by mouth daily  gabapentin (NEURONTIN) 300 MG capsule, Take 1 capsule (300 mg) by mouth 4 times daily  glecaprevir-pibrentasvir (MAVYRET) 100-40 MG per tablet, Take 3 tablets by mouth daily  ibuprofen (ADVIL/MOTRIN) 800 MG tablet, Take 1 tablet (800 mg) by mouth every 8 hours as needed for moderate pain  Lidocaine (LIDOCARE) 4 % Patch, Place 1 patch onto the skin every 24 hours To prevent lidocaine toxicity, patient should be patch free for 12 hrs daily.  naloxone (NARCAN) 1 mg/mL for intranasal kit (2 syringes with 2 mucosal atomizer device), In opioid overdose put cone in nostril and push 1/2 of contents into each nostril.  Repeat every 3 min if no response until help arrives.  nicotine (NICORETTE) 4 MG lozenge, Place 1 lozenge (4 mg) inside cheek as needed for smoking cessation  penicillin V (VEETID) 500 MG tablet,   polyethylene glycol (MIRALAX) 17 GM/SCOOP powder, Take 17 g by mouth daily as needed for constipation  propranolol ER (INDERAL LA) 80 MG 24 hr capsule, Take 80 mg by mouth 2 times daily    No current facility-administered medications  on file prior to visit.       REVIEW OF SYSTEMS:  General:  No acute withdrawal symptoms.  No recent infections or fever  Eyes/ENT:  No vision concerns.  No double vision.    Resp: No coughing, wheezing or shortness of breath  CV: No chest pains or palpitations  GI: No nausea, vomiting, abdominal pain, diarrhea.  No constipation  : No urinary frequency or dysuria    Musculoskeletal: No significant muscle or joint pains other than as above.  No edema  Neurologic: No numbness, tingling, weakness, problems with balance or coordination  Psychiatric: No acute concerns other than as above.   Skin: No rashes or areas of acute infection      OBJECTIVE                                                    LABS:  Labs reviewed. No UDS collected in clinic as patient evaluated over telephone visit.  Writer left message with [facility]  requesting UDS results for Edgar.     PHYSICAL EXAM:  GENERAL: healthy, alert and no distress,  EYES: Eyes grossly normal to inspection, conjunctivae and sclerae normal,  RESP: no audible wheeze, cough, or visible cyanosis.  No visible retractions or increased work of breathing.  Able to speak fully in complete sentences.  NEURO: Cranial nerves grossly intact, mentation intact and speech normal  PSYCH: mentation appears normal, affect normal/bright, judgement and insight intact, normal speech and appearance well-groomed      MENTAL STATUS EXAM:  Appearance:  awake, alert, adequately groomed, cooperative and no apparent distress  Attitude:  cooperative  Mood:  good  Affect:  appropriate and in normal range and mood congruent  Psychomotor Behavior:  no evidence of tardive dyskinesia, dystonia, or tics  Thought Content:  no evidence of suicidal ideation or homicidal ideation, no evidence of psychotic thought, no auditory hallucinations present and no visual hallucinations present  Insight:  good  Judgement:  intact  Oriented to:  time, person, and place    ASSESSMENT/PLAN                                                       (F11.20) Opioid use disorder, severe, dependence (H) with chronic pain  (primary encounter diagnosis)  Comment: hx of pain pill use and a few episodes of heroin use.  UDS regularly at [facility] and [facility] CD program.  Reports better pain control with increase in suboxone at last visit.   Plan: Continue buprenorphine HCl-naloxone HCl (SUBOXONE) 2-0.5        MG per film; place 1 film under the tongue twice daily. #42     Patient UDS results requested from [facility] #[phone] [facility] with patient authorization.  Rx se    Continue with CD treatment at [facility], volunteering while adhering to social distancing.      (F10.20) Alcohol use disorder, severe, in controlled environment (H)  (F19.10) Polysubstance abuse (H)  Comment: hx stimulant, alcohol and pain pill/heroin use.  Hx long-term rx benzodiazepine use without reported abuse or misuse.    Plan: Continue with CD treatment at [facility] with sober living as well as sponsorship.     Continue with complete abstinence including refraining from prescription or illicit benzodiazepine use.      (K59.03) Drug induced constipation  Comment: suboxone-induced, some discomfort with BM but able to void.  Co-pay for miralax so patient did not pickup.  Taking OTD fiber supplement instead which he reports has been effective at managing constipation.   Plan: Continue OTC fiber supplement, adequate fluid intake and activity as tolerated.      (Z79.899) High risk medication use  Comment: suboxone  Plan: UDS per treatment center and [facility], monitor for cravings and return to use as well as pain control       ENCOUNTER FOR LONG TERM USE OF HIGH RISK MEDICATION   High Risk Drug Monitoring?  YES   Drug being monitored: Buprenorphine   Reason for drug: Opioid use disorder   What is being monitored?: Dosage, Cravings, Trigger, side effects, and continued abstinence.      Follow-up: 3 weeks via video visit.     Patient counseling  completed today:  Counseled the patient on the importance of careful lifting and resting when needed to prevent complications with back pain as pain has been trigger for polysubstance use in the past.       Video-Visit Details    Type of service:  Video Visit    Video Start Time: 11:33 AM  Video End Time: 11:55 AM    Originating Location (pt. Location): Other [facility] sober living    Distant Location (provider location):  [facility] PRIMARY [facility]     Mode of Communication:  Video Conference via [employer]        As the provider I attest to compliance with applicable laws and regulations related to telemedicine and that the above documentation accurately summarizes the assessments, treatment plan, and patient education completed during this visit.         Linda Benoit, CNP  [facility] - Addiction Medicine  [facility]  [phone]

## 2020-05-06 ENCOUNTER — VIRTUAL VISIT (OUTPATIENT)
Dept: ADDICTION MEDICINE | Facility: CLINIC | Age: 40
End: 2020-05-06
Payer: COMMERCIAL

## 2020-05-06 DIAGNOSIS — F11.20 OPIOID USE DISORDER, SEVERE, DEPENDENCE (H): Primary | ICD-10-CM

## 2020-05-06 DIAGNOSIS — F10.20 ALCOHOL USE DISORDER, SEVERE, IN CONTROLLED ENVIRONMENT (H): ICD-10-CM

## 2020-05-06 DIAGNOSIS — Z79.899 HIGH RISK MEDICATION USE: ICD-10-CM

## 2020-05-06 DIAGNOSIS — K59.03 DRUG INDUCED CONSTIPATION: ICD-10-CM

## 2020-05-06 DIAGNOSIS — F19.10 POLYSUBSTANCE ABUSE (H): ICD-10-CM

## 2020-05-06 PROCEDURE — 99214 OFFICE O/P EST MOD 30 MIN: CPT | Mod: 95 | Performed by: NURSE PRACTITIONER

## 2020-05-06 RX ORDER — BUPRENORPHINE AND NALOXONE 2; .5 MG/1; MG/1
1 FILM, SOLUBLE BUCCAL; SUBLINGUAL 2 TIMES DAILY
Qty: 42 FILM | Refills: 0 | Status: SHIPPED | OUTPATIENT
Start: 2020-05-06 | End: 2020-05-22

## 2020-05-21 NOTE — PROGRESS NOTES
"[facility] ADDICTION MEDICINE  VIDEO Progress Note                                                      SUBJECTIVE                                                    Edgar Williamson is a 40 year old male who is being evaluated via a billable VIDEO visit due to COVID-19 pandemic in order to reduce potential unnecessary exposure to the virus.       Telemedicine Visit: The patient's condition can be safely assessed and treated via synchronous audio and visual telemedicine encounter.      The patient has been notified of following:     \"This video visit will be conducted via a call between you and your physician/provider. We have found that certain health care needs can be provided without the need for an in-person physical exam.  This service lets us provide the care you need with a video conversation.  If a prescription is necessary we can send it directly to your pharmacy.  If lab work is needed we can place an order for that and you can then stop by our lab to have the test done at a later time.    Video visits are billed at different rates depending on your insurance coverage.  Please reach out to your insurance provider with any questions.    If during the course of the call the physician/provider feels a video visit is not appropriate, you will not be charged for this service.\"    Patient has given verbal consent for Video visit? Yes    How would you like to obtain your AVS? Refused    Patient would like the video invitation sent by: Send to e-mail at: [email or web address]    Will anyone else be joining your video visit? No    Tonia Abdullahi MA   (MA signature)    Edgar Williamson complains of    Chief Complaint   Patient presents with     3 week follow up      Date of last visit:  5/6/2020  BUPRENORPHINE FOLLOW UP: current dose suboxone 2-0.5 mg BID.     Primary Care Provider: Charlee Hayes MD   [place] Board of Pharmacy Data Base Reviewed:    Yes; reviewed and no concerns for " diversionary activity.  Most recent data includes:  05/06/2020 3 05/06/2020 Suboxone 2 Mg-0.5 Mg Sl Film  42.00 21 Kr Abhijit    Brief History:    Initial visit with me on 12/13/2019 when UDS was positive for amphetamines and methamphetamine.  History of opioid and alcohol use disorders, major depression, generalized anxiety, initial insomnia, and chronic pain secondary to osteoarthritis of lumbar spine.  No alcohol use since 2015.  Completed treatment at [facility] in 11/2015 after detox stay at [facility].  No hx seizures or DTs.  Abusing opioid pain pills since 2011.  Once he stopped drinking in 2015 he started using more opioid pain pills and self-medicating back pain using up to 60 mg oxycontin daily (smoking) and tried heroin a few times.  No hx IVDU.  History of hallucinogen and cocaine use in 2009 and earlier.  Longest period of sobriety 4 years.  Hx of suboxone maintenance through [facility] for 4 months and was taking 16-4mg daily.      Psychiatric provider - Dr Cody at [facility] who manages his Lexapro 30 mg daily, propranolol ER 80 mg BID.      Hepatitis C Status -  02/19/2020 reactive and HCV RNA quant 12,443 international unit(s)/mL.  Complete treatment.      Date of last use:   2/8/2020 - heroin, alcohol, methamphetamine    HPI:    5/22/2020  Patient status since last visit: decompensated     Cravings: denies     MAT Dose: adequate; hasn't taken in 3 days due to lost medication    Side Effects: constipation - managed      Cues to use and relapse triggers: None    Patient reports high stress recently due to walking in on  overdosed at his sober house.  He reports he administered narcan and called EMS which resulted in significant stress and trouble focusing at the sober house afterward thus he requested sober house transfer.  He reports back pain increased since lowering this overdosed roommate down to the floor.  He is living in [place] now in another [facility] sober house.  The reported  overdose occurred about 1 week ago and he arrived to new sober [place] in [facility] on 5/19/2020.      Edgar gave verbal authorization for writer to discuss his UDS with Eliana Baird his new sober .  He reports his suboxone was locked in lockbox that the overdosed roommate had possession of and patient not able to access this supply anymore.  He reports his last dose of suboxone was the [facility] [place].  Denies withdrawal symptoms, some increased back pain.  Discussed inconsistency in his report of medication missing since overdose that occurred one week ago but had medication through Tuesday.  UDS results from 5/19/2020 reported by Eliana Baird + buprenorphine and negative all other substances including opiates and benzodiazepines.     Tobacco use - one cigarette daily and unchanged amount of vaping       I have reviewed and updated the patient's Past Medical History, Social History, Family History and Medication List.    Patient Active Problem List    Diagnosis Date Noted     Primary osteoarthritis of lumbar spine 04/23/2019     Priority: Medium     Strain of lumbar region, initial encounter 04/23/2019     Priority: Medium     Benign meningioma of brain (H)      Priority: Medium     Stasis dermatitis of both legs 12/22/2017     Priority: Medium     Trichiasis of right lower eyelid 08/03/2017     Priority: Medium     Eyelid laceration, right, initial encounter 03/02/2017     Priority: Medium     Altered mental status, unspecified 03/02/2017     Priority: Medium     Alcohol intoxication, with unspecified complication (H) 03/02/2017     Priority: Medium     Homeless single person 06/20/2016     Priority: Medium     Poisoning by benzodiazepines, undetermined, initial encounter 02/22/2016     Priority: Medium     Generalized anxiety disorder 09/28/2015     Priority: Medium     Diagnosis updated by automated process. Provider to review and confirm.       Alcohol abuse 09/14/2015      Priority: Medium     Lower urinary tract infectious disease 11/27/2014     Priority: Medium     Leukocytosis 11/27/2014     Priority: Medium     Nodule of right lung 11/26/2014     Priority: Medium     Overview:   Repeat CT chest recommended in 6 months, 12 months and 24 months       Kidney stone 11/26/2014     Priority: Medium     Opioid abuse, in remission (H) 03/07/2014     Priority: Medium     History of heroin abuse (H) 03/07/2014     Priority: Medium     Controlled substance agreement signed 08/14/2013     Priority: Medium     Overview:   Was getting alprazolam from an outside provider. Will not prescribe controlled substances for pt.        Seasonal allergies 03/12/2013     Priority: Medium     Insomnia 03/12/2013     Priority: Medium     Moderate major depression (H) 01/17/2011     Priority: Medium     CARDIOVASCULAR SCREENING; LDL GOAL LESS THAN 160 10/31/2010     Priority: Medium       Current Medications:  buprenorphine HCl-naloxone HCl (SUBOXONE) 2-0.5 MG per film, Place 1 Film under the tongue 2 times daily  escitalopram (LEXAPRO) 20 MG tablet, Take 30 mg by mouth daily  gabapentin (NEURONTIN) 300 MG capsule, Take 1 capsule (300 mg) by mouth 4 times daily  glecaprevir-pibrentasvir (MAVYRET) 100-40 MG per tablet, Take 3 tablets by mouth daily  ibuprofen (ADVIL/MOTRIN) 800 MG tablet, Take 1 tablet (800 mg) by mouth every 8 hours as needed for moderate pain  Lidocaine (LIDOCARE) 4 % Patch, Place 1 patch onto the skin every 24 hours To prevent lidocaine toxicity, patient should be patch free for 12 hrs daily.  naloxone (NARCAN) 1 mg/mL for intranasal kit (2 syringes with 2 mucosal atomizer device), In opioid overdose put cone in nostril and push 1/2 of contents into each nostril.  Repeat every 3 min if no response until help arrives.  nicotine (NICORETTE) 4 MG lozenge, Place 1 lozenge (4 mg) inside cheek as needed for smoking cessation  polyethylene glycol (MIRALAX) 17 GM/SCOOP powder, Take 17 g by mouth  "daily as needed for constipation  propranolol ER (INDERAL LA) 80 MG 24 hr capsule, Take 80 mg by mouth 2 times daily    No current facility-administered medications on file prior to visit.       REVIEW OF SYSTEMS:  General:  No acute withdrawal symptoms.  No recent infections or fever  Eyes/ENT:  No vision concerns.  No double vision.    Resp: No coughing, wheezing or shortness of breath  CV: No chest pains or palpitations  GI: No nausea, vomiting, abdominal pain, diarrhea.  No constipation  : No urinary frequency or dysuria    Musculoskeletal: No significant muscle or joint pains other than as above.  No edema  Neurologic: No numbness, tingling, weakness, problems with balance or coordination  Psychiatric: No acute concerns other than as above.   Skin: No rashes or areas of acute infection      OBJECTIVE                                                    LABS:  Labs reviewed. No UDS collected today as patient evaluated over VIDEO visit.  Per Eliana Baird current , patient UDS from 5/19/2020 positive for buprenorphine and negative for opioids, benzodiazepines and other substances.     PHYSICAL EXAM:  GENERAL: Healthy, alert and no distress  EYES: Eyes grossly normal to inspection.  No discharge or erythema, or obvious scleral/conjunctival abnormalities.  RESP: No audible wheeze, cough, or visible cyanosis.  No visible retractions or increased work of breathing.    SKIN: Visible skin clear. No significant rash, abnormal pigmentation or lesions.  NEURO: Cranial nerves grossly intact.  Mentation and speech appropriate for age.  PSYCH: Mentation appears normal, affect normal/bright, judgement and insight intact, normal speech and appearance well-groomed.      MENTAL STATUS EXAM:  Appearance:  awake, alert, appeared older than stated age, fatigued, cooperative and no apparent distress  Attitude:  cooperative and guarded  Mood:  \"okay\"  Affect:  appropriate and in normal range and mood " congruent  Psychomotor Behavior:  no evidence of tardive dyskinesia, dystonia, or tics  Thought Content:  no evidence of suicidal ideation or homicidal ideation, no evidence of psychotic thought, no auditory hallucinations present and no visual hallucinations present  Insight:  limited  Judgement:  fair  Oriented to:  time, person, and place    ASSESSMENT/PLAN                                                      (F11.20) Opioid use disorder, severe, dependence (H) with chronic pain  (primary encounter diagnosis)  Comment: hx of pain pill use and a few episodes of heroin use.  UDS regularly at [facility] and [facility] CD program.  Reports better pain control with increase in suboxone at last visit.   Plan: Continue buprenorphine HCl-naloxone HCl (SUBOXONE) 2-0.5        MG per film; place 1 film under the tongue TID. #63     Patient UDS results requested from Eliana Baird [facility] manager #[phone] with patient authorization to discuss UDS and situation at previous [facility] with patient lost medications.     Continue with CD treatment at [facility], volunteering while adhering to social distancing.      (F10.20) Alcohol use disorder, severe, in controlled environment (H)  (F19.10) Polysubstance abuse (H)  Comment: hx stimulant, alcohol and pain pill/heroin use.  Hx long-term rx benzodiazepine use without reported abuse or misuse.    Plan: Continue with CD treatment at [facility] with sober living as well as sponsorship.     Continue with complete abstinence including refraining from prescription or illicit benzodiazepine use.      (K59.03) Drug induced constipation  Comment: suboxone-induced, some mild constipation. Taking OTD fiber supplement instead which he reports has been effective at managing constipation.   Plan: Continue OTC fiber supplement, adequate fluid intake and activity as tolerated.      (Z79.899) High risk medication use  Comment: suboxone  Plan: UDS per treatment center and [facility], monitor  for cravings and return to use as well as pain control     ENCOUNTER FOR LONG TERM USE OF HIGH RISK MEDICATION   High Risk Drug Monitoring?  YES   Drug being monitored: Buprenorphine   Reason for drug: Opioid use disorder   What is being monitored?: Dosage, Cravings, Trigger, side effects, and continued abstinence.      Follow-up: 3 weeks via IN PERSON visit.     Patient counseling completed today:  Counseled the patient on the importance of discussing his experience of opioid overdose and narcan administration to peer in order to process this occurrence.  Also discussed importance of keeping his medication locked and in a secure place.        Video-Visit Details    Type of service:  Video Visit    Video Start Time: 2:38 PM  Video End Time: 2:56 PM    Originating Location (pt. Location): [facility]     Distant Location (provider location):  [facility]     Mode of Communication:  Video Conference via [employer] application        As the provider I attest to compliance with applicable laws and regulations related to telemedicine and that the above documentation accurately summarizes the assessments, treatment plan, and patient education completed during this visit.         Linda Benoit CNP  [facility] - Addiction Medicine  [facility]  [phone]

## 2020-05-22 ENCOUNTER — VIRTUAL VISIT (OUTPATIENT)
Dept: ADDICTION MEDICINE | Facility: CLINIC | Age: 40
End: 2020-05-22
Payer: COMMERCIAL

## 2020-05-22 DIAGNOSIS — K59.03 DRUG INDUCED CONSTIPATION: ICD-10-CM

## 2020-05-22 DIAGNOSIS — F11.20 OPIOID USE DISORDER, SEVERE, DEPENDENCE (H): Primary | ICD-10-CM

## 2020-05-22 DIAGNOSIS — F10.20 ALCOHOL USE DISORDER, SEVERE, IN CONTROLLED ENVIRONMENT (H): ICD-10-CM

## 2020-05-22 DIAGNOSIS — Z79.899 HIGH RISK MEDICATION USE: ICD-10-CM

## 2020-05-22 DIAGNOSIS — F19.10 POLYSUBSTANCE ABUSE (H): ICD-10-CM

## 2020-05-22 PROCEDURE — 99214 OFFICE O/P EST MOD 30 MIN: CPT | Mod: 95 | Performed by: NURSE PRACTITIONER

## 2020-05-22 RX ORDER — BUPRENORPHINE AND NALOXONE 2; .5 MG/1; MG/1
1 FILM, SOLUBLE BUCCAL; SUBLINGUAL 3 TIMES DAILY
Qty: 63 FILM | Refills: 0 | Status: SHIPPED | OUTPATIENT
Start: 2020-05-22 | End: 2020-06-12 | Stop reason: ALTCHOICE

## 2020-05-26 ENCOUNTER — TELEPHONE (OUTPATIENT)
Dept: ADDICTION MEDICINE | Facility: CLINIC | Age: 40
End: 2020-05-26

## 2020-05-26 DIAGNOSIS — F11.20 OPIOID USE DISORDER, SEVERE, DEPENDENCE (H): Primary | ICD-10-CM

## 2020-05-26 RX ORDER — BUPRENORPHINE HYDROCHLORIDE, NALOXONE HYDROCHLORIDE 2; .5 MG/1; MG/1
1 FILM, SOLUBLE BUCCAL; SUBLINGUAL 3 TIMES DAILY
Qty: 63 FILM | Refills: 0 | Status: SHIPPED | OUTPATIENT
Start: 2020-05-26 | End: 2020-08-07 | Stop reason: DRUGHIGH

## 2020-05-26 NOTE — TELEPHONE ENCOUNTER
I spoke to the pharmacy. They stated they won't allow a verbal switch to [facility] for Suboxone. They want a new E-script sent over.     RX pended and sent to provider.

## 2020-05-26 NOTE — TELEPHONE ENCOUNTER
Reason for Call:  clarification    Detailed comments: Patient had called stating he is unable to get his medication buprenorphine HCl-naloxone HCl (SUBOXONE) 2-0.5 MG per film unless it says suboxone on it since his insurance only covers brand name    Phone Number Patient can be reached at: Home number on file [phone] (home)    Best Time: Anytime    Can we leave a detailed message on this number? YES    Call taken on 5/26/2020 at 11:14 AM by Shweta Lopez

## 2020-06-10 NOTE — PROGRESS NOTES
SUBJECTIVE                                                    CC: Patient presents with:  Addiction Problem    BUPRENORPHINE FOLLOW UP: current dose suboxone 2-0.5 mg TID.      Edgar Williamson is a 40 year old male who presents to clinic today for follow up of Buprenorphine.      Date of last visit:  5/26/2020    Primary Care Provider: Charlee Hayes MD   [place] Board of Pharmacy Data Base Reviewed:    Yes; reviewed and no concerns for diversionary activity.  Most recent data includes:  05/26/2020 4 05/26/2020 Suboxone 2 Mg-0.5 Mg Sl Film  63.00 21 Kr Abhijit  05/22/2020 3 04/29/2020 Gabapentin 300 Mg Capsule  120.00 30 Mo Ton    Brief History:    Initial visit with me on 12/13/2019 when UDS was positive for amphetamines and methamphetamine.  History of opioid and alcohol use disorders, major depression, generalized anxiety, initial insomnia, and chronic pain secondary to osteoarthritis of lumbar spine.  No alcohol use since 2015.  Completed treatment at [facility] in 11/2015 after detox stay at [facility].  No hx seizures or DTs.  Abusing opioid pain pills since 2011.  Once he stopped drinking in 2015 he started using more opioid pain pills and self-medicating back pain using up to 60 mg oxycontin daily (smoking) and tried heroin a few times.  No hx IVDU.  History of hallucinogen and cocaine use in 2009 and earlier.  Longest period of sobriety 4 years.  Hx of suboxone maintenance through [facility] for 4 months and was taking 16-4mg daily.      Psychiatric provider - Dr Cody at [facility] who manages his Lexapro 30 mg daily, propranolol ER 80 mg BID.      Hepatitis C Status -  02/19/2020 reactive and HCV RNA quant 12,443 international unit(s)/mL.  Completed treatment.     Date of last use:   2/8/2020 - heroin, alcohol, methamphetamine    HPI:    6/12/2020  Patient status since last visit: improving     Cravings: denies     MAT Dose: inadequate for pain control    Side Effects: none    Cues to use and  relapse triggers: None    UDS today negative for buprenorphine which is unusual as patient reports strict 2 mg TID adherence the last several weeks.  He states his last dose was this morning.  Working now and riding his bike to work.  Patient approved contact with Eliana Baird (sober ) regarding patient's most recent UDS at his sober house.  He graduated [facility]  treatment last Thursday.  He plans to stay in [place] at least a year to get more stability with his recovery.     He states his constipation has been managed the last several weeks with fiber and prn miralax.  He is having more midday back pain now that he is working full-time and is requesting increase in suboxone dose.  Discussed in person visit for follow up to verify medication adherence.  His  will also be doing UDS in the next two days which includes screen for buprenorphine.     Tobacco use - vaping daily and smoking 1 cigarette daily      Social History:   Reviewed today.  See HPI for changes since last visit.     Problem list and histories reviewed & adjusted, as indicated.  Additional history: as documented    Patient Active Problem List    Diagnosis Date Noted     Primary osteoarthritis of lumbar spine 04/23/2019     Priority: Medium     Strain of lumbar region, initial encounter 04/23/2019     Priority: Medium     Benign meningioma of brain (H)      Priority: Medium     Stasis dermatitis of both legs 12/22/2017     Priority: Medium     Trichiasis of right lower eyelid 08/03/2017     Priority: Medium     Eyelid laceration, right, initial encounter 03/02/2017     Priority: Medium     Altered mental status, unspecified 03/02/2017     Priority: Medium     Alcohol intoxication, with unspecified complication (H) 03/02/2017     Priority: Medium     Homeless single person 06/20/2016     Priority: Medium     Poisoning by benzodiazepines, undetermined, initial encounter 02/22/2016     Priority: Medium     Generalized anxiety  disorder 09/28/2015     Priority: Medium     Diagnosis updated by automated process. Provider to review and confirm.       Alcohol abuse 09/14/2015     Priority: Medium     Lower urinary tract infectious disease 11/27/2014     Priority: Medium     Leukocytosis 11/27/2014     Priority: Medium     Nodule of right lung 11/26/2014     Priority: Medium     Overview:   Repeat CT chest recommended in 6 months, 12 months and 24 months       Kidney stone 11/26/2014     Priority: Medium     Opioid abuse, in remission (H) 03/07/2014     Priority: Medium     History of heroin abuse (H) 03/07/2014     Priority: Medium     Controlled substance agreement signed 08/14/2013     Priority: Medium     Overview:   Was getting alprazolam from an outside provider. Will not prescribe controlled substances for pt.        Seasonal allergies 03/12/2013     Priority: Medium     Insomnia 03/12/2013     Priority: Medium     Moderate major depression (H) 01/17/2011     Priority: Medium     CARDIOVASCULAR SCREENING; LDL GOAL LESS THAN 160 10/31/2010     Priority: Medium       Current Medications:  buprenorphine HCl-naloxone HCl (SUBOXONE) 2-0.5 MG per film, Place 1 Film under the tongue 3 times daily  escitalopram (LEXAPRO) 20 MG tablet, Take 30 mg by mouth daily  gabapentin (NEURONTIN) 300 MG capsule, Take 1 capsule (300 mg) by mouth 4 times daily  glecaprevir-pibrentasvir (MAVYRET) 100-40 MG per tablet, Take 3 tablets by mouth daily  ibuprofen (ADVIL/MOTRIN) 800 MG tablet, Take 1 tablet (800 mg) by mouth every 8 hours as needed for moderate pain  Lidocaine (LIDOCARE) 4 % Patch, Place 1 patch onto the skin every 24 hours To prevent lidocaine toxicity, patient should be patch free for 12 hrs daily.  naloxone (NARCAN) 4 MG/0.1ML nasal spray, Spray 1 spray (4 mg) into one nostril alternating nostrils as needed for opioid reversal every 2-3 minutes until assistance arrives  nicotine (NICORETTE) 4 MG lozenge, Place 1 lozenge (4 mg) inside cheek as  needed for smoking cessation  polyethylene glycol (MIRALAX) 17 GM/SCOOP powder, Take 17 g by mouth daily as needed for constipation  propranolol ER (INDERAL LA) 80 MG 24 hr capsule, Take 80 mg by mouth 2 times daily  SUBOXONE 2-0.5 MG per film, Place 1 Film under the tongue 3 times daily    No current facility-administered medications on file prior to visit.       Allergies   Allergen Reactions     Diphenhydramine Other (See Comments)     Restless Legs/Feet  Other reaction(s): Restless Legs/Feet  Restless legs  Other reaction(s): Restless Legs/Feet       Methadone Other (See Comments)     Had terrible itching and redness and was pulled off methadone     Mirtazapine Other (See Comments)     Restless Legs/Feet  Other reaction(s): Restless Legs/Feet  Restless legs.  Other reaction(s): Restless Legs/Feet       Seasonal Allergies      Trazodone Other (See Comments) and Unknown     Behavioral Disturbances  Other reaction(s): Behavioral Disturbances  Behavorial disturbance.  - restless legs         REVIEW OF SYSTEMS:  General:  No acute withdrawal symptoms.  No recent infections or fever  Eyes:  No vision concerns.  No double vision.    Resp: No coughing, wheezing or shortness of breath  CV: No chest pains or palpitations  GI: No nausea, vomiting, abdominal pain, diarrhea.  No constipation  : No urinary frequency or dysuria    Musculoskeletal: No significant muscle or joint pains other than as above.  No edema  Neurologic: No numbness, tingling, weakness, problems with balance or coordination  Psychiatric: No acute concerns other than as above.   Skin: No rashes or areas of acute infection      OBJECTIVE                                                    LABS:  Labs reviewed.  Urine tox results NEG: buprenorphine today which is unexpected given his reported strict medication adherence - will recheck at next visit and if continues to be negative will taper patient off suboxone given diversion potential.     Results for  orders placed or performed in visit on 06/12/20   Urine Drugs of Abuse Screen Panel 13     Status: None   Result Value Ref Range    Cannabinoids (44-tpr-7-carboxy-9-THC) Not Detected NDET^Not Detected ng/mL    Phencyclidine (Phencyclidine) Not Detected NDET^Not Detected ng/mL    Cocaine (Benzoylecgonine) Not Detected NDET^Not Detected ng/mL    Methamphetamine (d-Methamphetamine) Not Detected NDET^Not Detected ng/mL    Opiates (Morphine) Not Detected NDET^Not Detected ng/mL    Amphetamine (d-Amphetamine) Not Detected NDET^Not Detected ng/mL    Benzodiazepines (Nordiazepam) Not Detected NDET^Not Detected ng/mL    Tricyclic Antidepressants (Desipramine) Not Detected NDET^Not Detected ng/mL    Methadone (Methadone) Not Detected NDET^Not Detected ng/mL    Barbiturates (Butalbital) Not Detected NDET^Not Detected ng/mL    Oxycodone (Oxycodone) Not Detected NDET^Not Detected ng/mL    Propoxyphene (Norpropoxyphene) Not Detected NDET^Not Detected ng/mL    Buprenorphine (Buprenorphine) Not Detected NDET^Not Detected ng/mL       PHYSICAL EXAM:  /78   Pulse 66   Temp 97.9  F (36.6  C) (Oral)   Resp 14   Wt 97.1 kg (214 lb)   SpO2 99%   BMI 29.02 kg/m      GENERAL APPEARANCE:  alert, comfortable appearing  EYES:Eyes grossly normal to inspection  NEURO:  Gait normal.  No tremor. Coordination intact.     MENTAL STATUS EXAM:  Appearance:  awake, alert, adequately groomed, cooperative and no apparent distress  Attitude:  cooperative  Mood:  good  Affect:  appropriate and in normal range and mood congruent  Psychomotor Behavior:  no evidence of tardive dyskinesia, dystonia, or tics  Thought Content:  no evidence of suicidal ideation or homicidal ideation, no evidence of psychotic thought, no auditory hallucinations present and no visual hallucinations present  Insight:  fair  Judgement:  limited  Oriented to:  time, person, and place      ASSESSMENT/PLAN                                                      (F11.20) Opioid  use disorder, severe, dependence (H) with chronic pain  (primary encounter diagnosis)  Comment: hx of pain pill use and a few episodes of heroin use.  UDS regularly at [facility] and [facility] program.  States pain increased due to increased physical labor.   Plan: Continue buprenorphine HCl-naloxone HCl (SUBOXONE) 4-1MG per film; place 1/2 film under the tongue in the AM, 1 film at noon and 1/2 film in PM. #42  Will continue to monitor UDS to verify buprenorphine in urine as there is concern for potential diversion of suboxone given hx of stopping suboxone shortly after receiving past prescriptions and negative UDS for buprenorphine today which is unusual given his 6 mg daily dose and reported strict adherence.    UDS at next visit and at [facility] to verify buprenorphine present.  If negative again will taper suboxone and offer daily clinic such as Trezevant for methaone or suboxone to manage OUD and pain or naltrexone for OUD.      Patient UDS results requested from Eliana Baird new [facility] #[phone] with patient authorization to discuss UDS with patient verbal consent obtained again today.     June 4th UDS at [facility] was positive for buprenorphine per Eliana (sober ).  She reports Edgar's 6/4/2020 suboxone count was over expected count.  She states he is due for another UDS today or tomorrow.     Continue with sober living in [place],  work, volunteering while adhering to social distancing and staying engaged in the recovery community.       (F10.20) Alcohol use disorder, severe, in controlled environment (H)  (F19.10) Polysubstance abuse (H)  Comment: hx stimulant, alcohol and pain pill/heroin use.  Hx long-term rx benzodiazepine use without reported abuse or misuse.    Plan: Continue with sober living in [place], work schedule, as well as sponsorship and recovery community engagement.     Continue with complete abstinence including refraining from prescription or illicit  benzodiazepine use.      (K59.03) Drug induced constipation  Comment: suboxone-induced, some mild constipation. Taking OTD fiber supplement which he reports has been effective at managing constipation.  Occasionally taking miralax when needed.  Plan: Continue OTC fiber supplement, adequate fluid intake and activity as tolerated.     Take miralax 17 g once daily as needed for constipation.      (Z79.899) High risk medication use  Comment: suboxone  Plan: UDS per treatment center and sober house, monitor for cravings and return to use as well as pain control        ENCOUNTER FOR LONG TERM USE OF HIGH RISK MEDICATION   High Risk Drug Monitoring?  YES   Drug being monitored: Buprenorphine   Reason for drug: Opioid use disorder   What is being monitored?: Dosage, Cravings, Trigger, side effects, and continued abstinence.      Follow-up: 3 weeks as provider not here in 2 weeks and patient only able to come to clinic on Fridays as he works Mon-Thurs all day and half day Fridays    Patient counseling completed today:  Counseled the patient on the importance of being open and honest about his       Linda Benoit CNP  [facility] - Addiction Medicine  [facility]  [phone]

## 2020-06-12 ENCOUNTER — OFFICE VISIT (OUTPATIENT)
Dept: ADDICTION MEDICINE | Facility: CLINIC | Age: 40
End: 2020-06-12
Payer: COMMERCIAL

## 2020-06-12 ENCOUNTER — TELEPHONE (OUTPATIENT)
Dept: ADDICTION MEDICINE | Facility: CLINIC | Age: 40
End: 2020-06-12

## 2020-06-12 VITALS
TEMPERATURE: 97.9 F | WEIGHT: 214 LBS | DIASTOLIC BLOOD PRESSURE: 78 MMHG | HEART RATE: 66 BPM | SYSTOLIC BLOOD PRESSURE: 118 MMHG | RESPIRATION RATE: 14 BRPM | BODY MASS INDEX: 29.02 KG/M2 | OXYGEN SATURATION: 99 %

## 2020-06-12 DIAGNOSIS — F10.20 ALCOHOL USE DISORDER, SEVERE, IN CONTROLLED ENVIRONMENT (H): ICD-10-CM

## 2020-06-12 DIAGNOSIS — Z79.899 HIGH RISK MEDICATION USE: ICD-10-CM

## 2020-06-12 DIAGNOSIS — F11.20 OPIOID USE DISORDER, SEVERE, DEPENDENCE (H): Primary | ICD-10-CM

## 2020-06-12 DIAGNOSIS — F19.10 POLYSUBSTANCE ABUSE (H): ICD-10-CM

## 2020-06-12 DIAGNOSIS — F32.1 MODERATE MAJOR DEPRESSION (H): ICD-10-CM

## 2020-06-12 DIAGNOSIS — K59.03 DRUG INDUCED CONSTIPATION: ICD-10-CM

## 2020-06-12 LAB
AMPHETAMINES UR QL: NOT DETECTED NG/ML
BARBITURATES UR QL SCN: NOT DETECTED NG/ML
BENZODIAZ UR QL SCN: NOT DETECTED NG/ML
BUPRENORPHINE UR QL: NOT DETECTED NG/ML
CANNABINOIDS UR QL: NOT DETECTED NG/ML
COCAINE UR QL SCN: NOT DETECTED NG/ML
D-METHAMPHET UR QL: NOT DETECTED NG/ML
METHADONE UR QL SCN: NOT DETECTED NG/ML
OPIATES UR QL SCN: NOT DETECTED NG/ML
OXYCODONE UR QL SCN: NOT DETECTED NG/ML
PCP UR QL SCN: NOT DETECTED NG/ML
PROPOXYPH UR QL: NOT DETECTED NG/ML
TRICYCLICS UR QL SCN: NOT DETECTED NG/ML

## 2020-06-12 PROCEDURE — 99214 OFFICE O/P EST MOD 30 MIN: CPT | Performed by: NURSE PRACTITIONER

## 2020-06-12 PROCEDURE — 80306 DRUG TEST PRSMV INSTRMNT: CPT | Performed by: NURSE PRACTITIONER

## 2020-06-12 RX ORDER — ESCITALOPRAM OXALATE 10 MG/1
10 TABLET ORAL DAILY
Qty: 30 TABLET | Refills: 0 | Status: SHIPPED | OUTPATIENT
Start: 2020-06-12 | End: 2020-09-11

## 2020-06-12 RX ORDER — BUPRENORPHINE AND NALOXONE 4; 1 MG/1; MG/1
FILM, SOLUBLE BUCCAL; SUBLINGUAL
Qty: 28 FILM | Refills: 0 | COMMUNITY
Start: 2020-06-12 | End: 2020-06-12

## 2020-06-12 RX ORDER — BUPRENORPHINE AND NALOXONE 4; 1 MG/1; MG/1
FILM, SOLUBLE BUCCAL; SUBLINGUAL
Qty: 42 FILM | Refills: 0 | Status: SHIPPED | OUTPATIENT
Start: 2020-06-12 | End: 2020-07-03

## 2020-06-12 RX ORDER — ESCITALOPRAM OXALATE 20 MG/1
20 TABLET ORAL DAILY
Qty: 30 TABLET | Refills: 0 | Status: SHIPPED | OUTPATIENT
Start: 2020-06-12 | End: 2020-09-11

## 2020-06-12 NOTE — TELEPHONE ENCOUNTER
Reason for Call:  Other call back and prescription    Detailed comments: Patient called to let the provider know that he needs this medication buprenorphine HCl-naloxone HCl (SUBOXONE) 4-1 MG per film to be sent to a different pharmacy because the original pharmacy doesn't have the medication on stock. Please send a new script to this pharmacy instead. Called patient back if there is any further questions or concerns regarding this change.     [facility]: [address] [place] [id number]   Ph: [phone]      Phone Number Patient can be reached at: Home number on file [phone] (home)    Best Time: Any     Can we leave a detailed message on this number? YES    Call taken on 6/12/2020 at 3:34 PM by Alea Dodge

## 2020-06-12 NOTE — PATIENT INSTRUCTIONS
- continue lexapro 30 mg daily.     - Increase suboxone 4-1 mg films; take 1/2 film in the AM, 1 film at Noon and 1/2 film in PM.    - Start individual therapy

## 2020-06-12 NOTE — TELEPHONE ENCOUNTER
Suboxone rx called into [facility], per patient request. Rx cancelled at [facility].    Gisel Quiñones RN on 6/12/2020 at 4:13 PM

## 2020-07-02 ENCOUNTER — TELEPHONE (OUTPATIENT)
Dept: ADDICTION MEDICINE | Facility: CLINIC | Age: 40
End: 2020-07-02

## 2020-07-02 NOTE — TELEPHONE ENCOUNTER
Called patient - no answer - left voice message with appointment reminder and that at this time, the appointment is still scheduled to take place at the clinic and that we will contact him if it's been approved to be changed to a phone appointment.    Azra Allen  Patient Representative  [facility] [place]

## 2020-07-02 NOTE — TELEPHONE ENCOUNTER
Reason for Call:  Other appointment    Detailed comments: On 6- patient requested to change his 7-3-2020 appointment.    Routing for approval.        Phone Number Patient can be reached at:     Best Time:     Can we leave a detailed message on this number?     Azra Weems  Patient Representative  [facility]

## 2020-07-02 NOTE — TELEPHONE ENCOUNTER
Patient would like to change his appointment on 7/3/20 to a telephone visit. Routing to provider for approval.

## 2020-07-02 NOTE — TELEPHONE ENCOUNTER
Writer spoke with provider - provider ok'd appt to being virtual but the next appt will NEED to be in person.  Writer is closing this encounter, no further action needed.     Shweta Lopez    [facility]

## 2020-07-02 NOTE — TELEPHONE ENCOUNTER
Patient had returned writers call; Patient stated he already has his ride set up and will be coming in to the clinic.  Writer changed the appt back to in person  Writer is closing this encounter, no further action needed.     Shweta Lopez    [facility]

## 2020-07-03 ENCOUNTER — VIRTUAL VISIT (OUTPATIENT)
Dept: ADDICTION MEDICINE | Facility: CLINIC | Age: 40
End: 2020-07-03
Payer: COMMERCIAL

## 2020-07-03 VITALS
OXYGEN SATURATION: 98 % | RESPIRATION RATE: 16 BRPM | BODY MASS INDEX: 28.21 KG/M2 | SYSTOLIC BLOOD PRESSURE: 110 MMHG | TEMPERATURE: 97.9 F | WEIGHT: 208 LBS | HEART RATE: 78 BPM | DIASTOLIC BLOOD PRESSURE: 60 MMHG

## 2020-07-03 DIAGNOSIS — F19.10 POLYSUBSTANCE ABUSE (H): ICD-10-CM

## 2020-07-03 DIAGNOSIS — F11.20 OPIOID USE DISORDER, SEVERE, DEPENDENCE (H): Primary | ICD-10-CM

## 2020-07-03 DIAGNOSIS — Z79.899 HIGH RISK MEDICATION USE: ICD-10-CM

## 2020-07-03 DIAGNOSIS — F10.20 ALCOHOL USE DISORDER, SEVERE, IN CONTROLLED ENVIRONMENT (H): ICD-10-CM

## 2020-07-03 LAB
AMPHETAMINES UR QL: NOT DETECTED NG/ML
BARBITURATES UR QL SCN: NOT DETECTED NG/ML
BENZODIAZ UR QL SCN: NOT DETECTED NG/ML
BUPRENORPHINE UR QL: ABNORMAL NG/ML
CANNABINOIDS UR QL: NOT DETECTED NG/ML
COCAINE UR QL SCN: NOT DETECTED NG/ML
D-METHAMPHET UR QL: NOT DETECTED NG/ML
METHADONE UR QL SCN: NOT DETECTED NG/ML
OPIATES UR QL SCN: NOT DETECTED NG/ML
OXYCODONE UR QL SCN: NOT DETECTED NG/ML
PCP UR QL SCN: NOT DETECTED NG/ML
PROPOXYPH UR QL: NOT DETECTED NG/ML
TRICYCLICS UR QL SCN: NOT DETECTED NG/ML

## 2020-07-03 PROCEDURE — 99214 OFFICE O/P EST MOD 30 MIN: CPT | Performed by: NURSE PRACTITIONER

## 2020-07-03 PROCEDURE — 80299 QUANTITATIVE ASSAY DRUG: CPT | Mod: 90 | Performed by: NURSE PRACTITIONER

## 2020-07-03 PROCEDURE — 99000 SPECIMEN HANDLING OFFICE-LAB: CPT | Performed by: NURSE PRACTITIONER

## 2020-07-03 PROCEDURE — 80306 DRUG TEST PRSMV INSTRMNT: CPT | Performed by: NURSE PRACTITIONER

## 2020-07-03 PROCEDURE — 36415 COLL VENOUS BLD VENIPUNCTURE: CPT | Performed by: NURSE PRACTITIONER

## 2020-07-03 RX ORDER — BUPRENORPHINE AND NALOXONE 4; 1 MG/1; MG/1
FILM, SOLUBLE BUCCAL; SUBLINGUAL
Qty: 60 FILM | Refills: 0 | Status: SHIPPED | OUTPATIENT
Start: 2020-07-03 | End: 2020-07-31

## 2020-07-03 ASSESSMENT — PAIN SCALES - GENERAL: PAINLEVEL: MODERATE PAIN (5)

## 2020-07-03 NOTE — PROGRESS NOTES
SUBJECTIVE                                                    CC: Patient presents with:  Video Visit: Return    BUPRENORPHINE FOLLOW UP: current dose suboxone 4-1 mg films; total of 2 films daily.     Edgar Williamson is a 40 year old male who presents to clinic today for follow up of Buprenorphine.      Date of last visit:  7/2/2020    Primary Care Provider: Charlee Hayes MD   [place] Board of Pharmacy Data Base Reviewed:    Yes; reviewed and no concerns for diversionary activity.  Most recent data includes:  06/20/2020 3 04/29/2020 Gabapentin 300 Mg Capsule  120.00 30 Mo Ton  06/12/2020 3 06/12/2020 Suboxone 4 Mg-1 Mg Sl Film  42.00 21 Kr Abhijit    Brief History:    Initial visit with me on 12/13/2019 when UDS was positive for amphetamines and methamphetamine.  History of opioid and alcohol use disorders, major depression, generalized anxiety, initial insomnia, and chronic pain secondary to osteoarthritis of lumbar spine.  No alcohol use since 2015.  Completed treatment at [facility] in 11/2015 after detox stay at [facility].  No hx seizures or DTs.  Abusing opioid pain pills since 2011.  Once he stopped drinking in 2015 he started using more opioid pain pills and self-medicating back pain using up to 60 mg oxycontin daily (smoking) and tried heroin a few times.  No hx IVDU.  History of hallucinogen and cocaine use in 2009 and earlier.  Longest period of sobriety 4 years.  Hx of suboxone maintenance through [facility] for 4 months and was taking 16-4mg daily.      Psychiatric provider - Dr Cody at [facility] who manages his Lexapro 30 mg daily, propranolol ER 80 mg BID.      Hepatitis C Status -  02/19/2020 reactive and HCV RNA quant 12,443 international unit(s)/mL.  Completed treatment.      Date of last use:   2/8/2020 - heroin, alcohol, methamphetamine    HPI:    7/3/2020  Patient status since last visit: improving    Cravings: denies    MAT Dose: adequate    Side Effects: none.      Cues to use  and relapse triggers: None    UDS negative for buprenorphine at last visit on 6/12/2020 despite reported medication adherence.  Discussed need for in person follow up to ensure adherence due to risk of diversion and concern given patient's previous behaviors regarding suboxone.  His UDS today is positive for buprenorphine.  Will send for buprenorphine confirmation urine testing today as well.     Tobacco use - vaping daily and smoking 1 cigarette daily    Social History:   Reviewed today.  See HPI for changes since last visit.     Problem list and histories reviewed & adjusted, as indicated.  Additional history: as documented    Patient Active Problem List    Diagnosis Date Noted     Primary osteoarthritis of lumbar spine 04/23/2019     Priority: Medium     Strain of lumbar region, initial encounter 04/23/2019     Priority: Medium     Benign meningioma of brain (H)      Priority: Medium     Stasis dermatitis of both legs 12/22/2017     Priority: Medium     Trichiasis of right lower eyelid 08/03/2017     Priority: Medium     Eyelid laceration, right, initial encounter 03/02/2017     Priority: Medium     Altered mental status, unspecified 03/02/2017     Priority: Medium     Alcohol intoxication, with unspecified complication (H) 03/02/2017     Priority: Medium     Homeless single person 06/20/2016     Priority: Medium     Poisoning by benzodiazepines, undetermined, initial encounter 02/22/2016     Priority: Medium     Generalized anxiety disorder 09/28/2015     Priority: Medium     Diagnosis updated by automated process. Provider to review and confirm.       Alcohol abuse 09/14/2015     Priority: Medium     Lower urinary tract infectious disease 11/27/2014     Priority: Medium     Leukocytosis 11/27/2014     Priority: Medium     Nodule of right lung 11/26/2014     Priority: Medium     Overview:   Repeat CT chest recommended in 6 months, 12 months and 24 months       Kidney stone 11/26/2014     Priority: Medium      Opioid abuse, in remission (H) 03/07/2014     Priority: Medium     History of heroin abuse (H) 03/07/2014     Priority: Medium     Controlled substance agreement signed 08/14/2013     Priority: Medium     Overview:   Was getting alprazolam from an outside provider. Will not prescribe controlled substances for pt.        Seasonal allergies 03/12/2013     Priority: Medium     Insomnia 03/12/2013     Priority: Medium     Moderate major depression (H) 01/17/2011     Priority: Medium     CARDIOVASCULAR SCREENING; LDL GOAL LESS THAN 160 10/31/2010     Priority: Medium       Current Medications:  buprenorphine HCl-naloxone HCl (SUBOXONE) 4-1 MG per film, Take 1/2 film in the AM, 1 film at Noon and 1/2 film in the PM  escitalopram (LEXAPRO) 10 MG tablet, Take 1 tablet (10 mg) by mouth daily  escitalopram (LEXAPRO) 20 MG tablet, Take 1 tablet (20 mg) by mouth daily In addition to 10 mg tab for a total of 30 mg daily.  gabapentin (NEURONTIN) 300 MG capsule, Take 1 capsule (300 mg) by mouth 4 times daily  glecaprevir-pibrentasvir (MAVYRET) 100-40 MG per tablet, Take 3 tablets by mouth daily  ibuprofen (ADVIL/MOTRIN) 800 MG tablet, Take 1 tablet (800 mg) by mouth every 8 hours as needed for moderate pain  Lidocaine (LIDOCARE) 4 % Patch, Place 1 patch onto the skin every 24 hours To prevent lidocaine toxicity, patient should be patch free for 12 hrs daily.  naloxone (NARCAN) 4 MG/0.1ML nasal spray, Spray 1 spray (4 mg) into one nostril alternating nostrils as needed for opioid reversal every 2-3 minutes until assistance arrives  nicotine (NICORETTE) 4 MG lozenge, Place 1 lozenge (4 mg) inside cheek as needed for smoking cessation  polyethylene glycol (MIRALAX) 17 GM/SCOOP powder, Take 17 g by mouth daily as needed for constipation  propranolol ER (INDERAL LA) 80 MG 24 hr capsule, Take 80 mg by mouth 2 times daily  SUBOXONE 2-0.5 MG per film, Place 1 Film under the tongue 3 times daily    No current facility-administered  medications on file prior to visit.       Allergies   Allergen Reactions     Diphenhydramine Other (See Comments)     Restless Legs/Feet  Other reaction(s): Restless Legs/Feet  Restless legs  Other reaction(s): Restless Legs/Feet       Methadone Other (See Comments)     Had terrible itching and redness and was pulled off methadone     Mirtazapine Other (See Comments)     Restless Legs/Feet  Other reaction(s): Restless Legs/Feet  Restless legs.  Other reaction(s): Restless Legs/Feet       Seasonal Allergies      Trazodone Other (See Comments) and Unknown     Behavioral Disturbances  Other reaction(s): Behavioral Disturbances  Behavorial disturbance.  - restless legs         REVIEW OF SYSTEMS:  General:  No acute withdrawal symptoms.  No recent infections or fever  Eyes:  No vision concerns.  No double vision.    Resp: No coughing, wheezing or shortness of breath  CV: No chest pains or palpitations  GI: No nausea, vomiting, abdominal pain, diarrhea.  No constipation  : No urinary frequency or dysuria    Musculoskeletal: No significant muscle or joint pains other than as above.  No edema  Neurologic: No numbness, tingling, weakness, problems with balance or coordination  Psychiatric: No acute concerns other than as above.   Skin: No rashes or areas of acute infection      OBJECTIVE                                                    LABS:  Labs reviewed.  Urine tox results POS: buprenorphine today. Buprenorphine qual urine add-on test sent today to verify medication adherence given previous negative buprenorphine results despite reported adherence.     Results for orders placed or performed in visit on 07/03/20   Urine Drugs of Abuse Screen Panel 13     Status: Abnormal   Result Value Ref Range    Cannabinoids (72-nwc-0-carboxy-9-THC) Not Detected NDET^Not Detected ng/mL    Phencyclidine (Phencyclidine) Not Detected NDET^Not Detected ng/mL    Cocaine (Benzoylecgonine) Not Detected NDET^Not Detected ng/mL     Methamphetamine (d-Methamphetamine) Not Detected NDET^Not Detected ng/mL    Opiates (Morphine) Not Detected NDET^Not Detected ng/mL    Amphetamine (d-Amphetamine) Not Detected NDET^Not Detected ng/mL    Benzodiazepines (Nordiazepam) Not Detected NDET^Not Detected ng/mL    Tricyclic Antidepressants (Desipramine) Not Detected NDET^Not Detected ng/mL    Methadone (Methadone) Not Detected NDET^Not Detected ng/mL    Barbiturates (Butalbital) Not Detected NDET^Not Detected ng/mL    Oxycodone (Oxycodone) Not Detected NDET^Not Detected ng/mL    Propoxyphene (Norpropoxyphene) Not Detected NDET^Not Detected ng/mL    Buprenorphine (Buprenorphine) Detected, Abnormal Result (A) NDET^Not Detected ng/mL       PHYSICAL EXAM:  /60   Pulse 78   Temp 97.9  F (36.6  C) (Temporal)   Resp 16   Wt 94.3 kg (208 lb)   SpO2 98%   BMI 28.21 kg/m      GENERAL APPEARANCE:  alert, comfortable appearing  EYES:Eyes grossly normal to inspection and conjunctivae and sclerae normal  NEURO:  Gait normal.  No tremor. Coordination intact.     MENTAL STATUS EXAM:  Appearance:  awake, alert, well groomed, cooperative and mild distress  Attitude:  cooperative and guarded  Mood:  better  Affect:  appropriate and in normal range and mood congruent  Psychomotor Behavior:  no evidence of tardive dyskinesia, dystonia, or tics  Thought Content:  no evidence of suicidal ideation or homicidal ideation, no evidence of psychotic thought, no auditory hallucinations present and no visual hallucinations present  Insight:  fair  Judgement:  fair  Oriented to:  time, person, and place      ASSESSMENT/PLAN                                                      (F11.20) Opioid use disorder, severe, dependence (H) with chronic pain  (primary encounter diagnosis)  Comment: hx of pain pill use and a few episodes of heroin use.  UDS regularly at [facility] CD program.  States pain better managed with current suboxone dose and is doing well at [facility]  house/with work schedule.      Plan: Continue buprenorphine HCl-naloxone HCl (SUBOXONE) 4-1MG per film; place 1/2 film under the tongue in the AM, 1 film at noon and 1/2 film in PM. #60  Will continue to monitor UDS to verify buprenorphine in urine as there is concern for potential diversion of suboxone given hx of stopping suboxone shortly after receiving past prescriptions and negative UDS for buprenorphine at his last visit which is unusual given his 6 mg daily dose and reported strict adherence.     Buprenorphine qual urine add-on test ordered today to verify suboxone adherence given last test was negative.     Continue with sober living in [place],  work, volunteering while adhering to social distancing and staying engaged in the recovery community.       (F10.20) Alcohol use disorder, severe, in controlled environment (H)  (F19.10) Polysubstance abuse (H)  Comment: hx stimulant, alcohol and pain pill/heroin use.  Hx long-term rx benzodiazepine use without reported abuse or misuse.    Plan: Continue with sober living in [place], work schedule, as well as sponsorship and recovery community engagement.     Continue with complete abstinence including refraining from prescription or illicit benzodiazepine use.      (Z79.899) High risk medication use  Comment: suboxone  Plan: UDS per treatment center and sober house, monitor for cravings and return to use as well as pain control       ENCOUNTER FOR LONG TERM USE OF HIGH RISK MEDICATION   High Risk Drug Monitoring?  YES   Drug being monitored: Buprenorphine   Reason for drug: Opioid use disorder   What is being monitored?: Dosage, Cravings, Trigger, side effects, and continued abstinence.      Follow-up: 1 month IN PERSON      Linda Benoit CNP  [facility] - Addiction Medicine  [facility]  [phone]

## 2020-07-08 LAB
BUPRENORPHINE GLUCURONIDE URINE: 174 NG/ML
BUPRENORPHINE UR CFM-MCNC: POSITIVE NG/ML
NALOXONE URINE: <100 NG/ML
NORBUPRENORPHINE GLUCURONIDE URINE: 163 NG/ML
NORBUPRENORPHINE UR CFM-MCNC: 73 NG/ML

## 2020-07-31 ENCOUNTER — TELEPHONE (OUTPATIENT)
Dept: ADDICTION MEDICINE | Facility: CLINIC | Age: 40
End: 2020-07-31

## 2020-07-31 DIAGNOSIS — F11.20 OPIOID USE DISORDER, SEVERE, DEPENDENCE (H): ICD-10-CM

## 2020-07-31 RX ORDER — BUPRENORPHINE AND NALOXONE 4; 1 MG/1; MG/1
FILM, SOLUBLE BUCCAL; SUBLINGUAL
Qty: 14 FILM | Refills: 0 | Status: SHIPPED | OUTPATIENT
Start: 2020-07-31 | End: 2020-08-07

## 2020-07-31 NOTE — TELEPHONE ENCOUNTER
Reason for Call:  Medication Request due to: Provider's out of office. Today's appt needed to be rescheduled. Patient's insurance runs out today. No other provider had available appt times for today. States one of the things he was going to discuss with the provider at today's appt, is it possible to get a months' supply of Rx while he's looking for and applying to new insurance. If there are any cancellations for today, requests clinic to call him because he's not sure he can get new insurance in the next week.    Name of the pharmacy and phone number for the current request:  [id number] - [place], [place] - [address] AT [address]  P: [phone]  F: [phone]    Request for bridge of: buprenorphine HCl-naloxone HCl (SUBOXONE) 4-1 MG per film    Other Information:   Taking as prescribed: Yes  Date/Time/ amount of last dose: 7/31/2020, this evening, as prescribed    Last appt: 7-3-2020  No Show appt: none (7- appt needed to be rescheduled because provider's out)  Next appt: 8-7-2020 at 11:30    Pt informed to follow up with pharmacy for status of refill as addiction RN will only reach out if there are any issues or questions and will be addressed within one business day.  Pt also informed that this request for a bridge is simply a request and doesn't guarantee the medication will be filled.    Can we leave a detailed message on this number? Yes    Phone number patient can be reached at: [phone]    Best Time: anytime    Azra Rhodhiss  Patient Representative  [facility] [place] [facility]

## 2020-07-31 NOTE — TELEPHONE ENCOUNTER
"Edgar was originally scheduled for appt w/Linda Benoit today who is out of the office unexpectedly. Appt rescheduled as virtual appt 8/7/20. Per Linda's note 7/3/20, appt needs to be in-clinic:    \"Will continue to monitor UDS to verify buprenorphine in urine as there is concern for potential diversion of suboxone given hx of stopping suboxone shortly after receiving past prescriptions and negative UDS for buprenorphine at his last visit which is unusual given his 6 mg daily dose and reported strict adherence.\"    Phone call to Edgar. He reports this is the last day of active insurance and he is requesting a month's Suboxone rx. Informed Edgar that a Suboxone bridge rx request through next appt will be sent to a covering provider but he will need to visit with Linda to discuss a month rx. Edgar reports last dose of Suboxone will be tomorrow.     Informed Edgar that appt 8/7/20 needs to be changed to in-clinic appt. Edgar stated that was incorrect and would not change appt to in-clinic. RN will clarify with Linda and contact patient next week if appt needs to be changed to in-clinic.     Edgar expressed frustration that today is his last day of active insurance and he won't be able to get a month's rx for Suboxone. Edgar reported he has not had time to visit [email or web address] or [facility] to maintain active insurance. Offered Edgar the contact number for the  Financial Counselor ([phone]), Edgar declined.     Informed Edgar of GoodRx option to assist with cost of Suboxone, if needed.    Last appt w/Linda: 7/3/20  Next appt: 8/7/20    [place]       Routing Suboxone bridge request to Dr Mauricio.  Routing to Linda to clarify if appt 8/7 needs to be in-clinic.    Gisel Quiñones RN on 7/31/2020 at 12:09 PM        "

## 2020-08-04 DIAGNOSIS — F11.20 OPIOID USE DISORDER, SEVERE, DEPENDENCE (H): ICD-10-CM

## 2020-08-05 RX ORDER — BUPRENORPHINE AND NALOXONE 4; 1 MG/1; MG/1
FILM, SOLUBLE BUCCAL; SUBLINGUAL
Qty: 14 FILM | Refills: 0 | OUTPATIENT
Start: 2020-08-05

## 2020-08-05 NOTE — TELEPHONE ENCOUNTER
Patient has an appointment on 8/7/20, and should have enough medication to get him to his appointment. Refills can be addressed at his appointment.     Last Refill in Epic (date and amount/how many days):   buprenorphine HCl-naloxone HCl (SUBOXONE) 4-1 MG per film  14 Film  0  7/31/2020   No    Sig: Take 1/2 film in the AM, 1 film at Noon and 1/2 film in the PM    Sent to pharmacy as: Buprenorphine HCl-Naloxone HCl 4-1 MG Sublingual Film (Suboxone)    Class: E-Prescribe    Notes to Pharmacy: DELILAH [id number]    Order: [id number]    E-Prescribing Status: Receipt confirmed by pharmacy (7/31/2020 12:53 PM CDT)    Outpatient Morphine Equivalent Daily Dose (MEDD)

## 2020-08-05 NOTE — TELEPHONE ENCOUNTER
Routing refill request to provider for review/approval because:  Drug not on the [facility] refill protocol  Last refill 7/14 - disp: 14  Concerns: none  Last office visit 7/3 - addiction - akilah

## 2020-08-07 ENCOUNTER — VIRTUAL VISIT (OUTPATIENT)
Dept: ADDICTION MEDICINE | Facility: CLINIC | Age: 40
End: 2020-08-07
Payer: COMMERCIAL

## 2020-08-07 DIAGNOSIS — F11.20 OPIOID USE DISORDER, SEVERE, DEPENDENCE (H): Primary | ICD-10-CM

## 2020-08-07 DIAGNOSIS — F10.20 ALCOHOL USE DISORDER, SEVERE, IN CONTROLLED ENVIRONMENT (H): ICD-10-CM

## 2020-08-07 DIAGNOSIS — Z79.899 HIGH RISK MEDICATION USE: ICD-10-CM

## 2020-08-07 DIAGNOSIS — F19.10 POLYSUBSTANCE ABUSE (H): ICD-10-CM

## 2020-08-07 PROCEDURE — 99214 OFFICE O/P EST MOD 30 MIN: CPT | Mod: 95 | Performed by: NURSE PRACTITIONER

## 2020-08-07 RX ORDER — BUPRENORPHINE AND NALOXONE 4; 1 MG/1; MG/1
FILM, SOLUBLE BUCCAL; SUBLINGUAL
Qty: 60 FILM | Refills: 0 | Status: SHIPPED | OUTPATIENT
Start: 2020-08-07 | End: 2020-09-11

## 2020-08-07 NOTE — PROGRESS NOTES
"[facility] ADDICTION MEDICINE  VIDEO Progress Note                                                      SUBJECTIVE                                                    Edgar Williamson is a 40 year old male who is being evaluated via a billable VIDEO visit due to COVID-19 pandemic in order to reduce potential unnecessary exposure to the virus.         Telemedicine Visit: The patient's condition can be safely assessed and treated via synchronous audio and visual telemedicine encounter.      The patient has been notified of following:     \"This video visit will be conducted via a call between you and your physician/provider. We have found that certain health care needs can be provided without the need for an in-person physical exam.  This service lets us provide the care you need with a video conversation.  If a prescription is necessary we can send it directly to your pharmacy.  If lab work is needed we can place an order for that and you can then stop by our lab to have the test done at a later time.    Video visits are billed at different rates depending on your insurance coverage.  Please reach out to your insurance provider with any questions.    If during the course of the call the physician/provider feels a video visit is not appropriate, you will not be charged for this service.\"    Patient has given verbal consent for Video visit? Yes    How would you like to obtain your AVS? Mail a copy    Patient would like the video invitation sent by: Text to cell phone: [phone]    Will anyone else be joining your video visit? No    Edgar Williamson complains of    Chief Complaint   Patient presents with     RECHECK     Date of last visit:  7/31/2020  BUPRENORPHINE FOLLOW UP: current dose suboxone 4-1 mg films; place 1/2 film under the tongue in the AM, 1 film at noon and 1/2 film in PM.     Primary Care Provider: Charlee Hayes MD   [place] Board of Pharmacy Data Base Reviewed:    Yes; reviewed and " no concerns for diversionary activity.  Most recent data includes:  07/31/2020 4 07/31/2020 Suboxone 4 Mg-1 Mg Sl Film  14.00 7 Al Hub  07/17/2020 4 * 04/29/2020 Gabapentin 300 Mg Capsule  120.00 30 Mo Ton    Brief History:    Initial visit with me on 12/13/2019 when UDS was positive for amphetamines and methamphetamine.  History of opioid and alcohol use disorders, major depression, generalized anxiety, initial insomnia, and chronic pain secondary to osteoarthritis of lumbar spine.  No alcohol use since 2015.  Completed treatment at [facility] in 11/2015 after detox stay at [facility].  No hx seizures or DTs.  Abusing opioid pain pills since 2011.  Once he stopped drinking in 2015 he started using more opioid pain pills and self-medicating back pain using up to 60 mg oxycontin daily (smoking) and tried heroin a few times.  No hx IVDU.  History of hallucinogen and cocaine use in 2009 and earlier.  Longest period of sobriety 4 years.  Hx of suboxone maintenance through [facility] for 4 months and was taking 16-4mg daily.      Psychiatric provider - Dr Cody at [facility] who manages his Lexapro 30 mg daily, propranolol ER 80 mg BID.      Hepatitis C Status -  02/19/2020 reactive and HCV RNA quant 12,443 international unit(s)/mL.  Completed treatment.      Date of last use:   2/8/2020 - heroin, alcohol, methamphetamine    HPI:    8/6/2020  Patient status since last visit: improving     Cravings: denies    MAT Dose: adequate    Side Effects: none    Cues to use and relapse triggers: None    Everything has been going well.  Still in sober living in [place].  Spending time with friends on the weekends and working during the week.  He is learning to work with new [employer] software at work which he enjoys.  No substance use or challenges the last month he hasn't been able to manage.     He reports he now qualifies for insurance through work but wants to continue with [employer].  He states he got an extension on his  insurance until the end of this month to prevent lapse in care.  He is working with [employer] to apply and select health plan.       Goes to meetings after work 2-3 days a week and working with his sponsor still.  Some back stiffness/soreness after heavy lifting at work.  Otherwise suboxone helping manage pain without side effects.  He will have 6 months sober tomorrow.      Tobacco use - vaping daily and smoking 1 cigarette daily    I have reviewed and updated the patient's Past Medical History, Social History, Family History and Medication List.    Patient Active Problem List    Diagnosis Date Noted     Primary osteoarthritis of lumbar spine 04/23/2019     Priority: Medium     Strain of lumbar region, initial encounter 04/23/2019     Priority: Medium     Benign meningioma of brain (H)      Priority: Medium     Stasis dermatitis of both legs 12/22/2017     Priority: Medium     Trichiasis of right lower eyelid 08/03/2017     Priority: Medium     Eyelid laceration, right, initial encounter 03/02/2017     Priority: Medium     Altered mental status, unspecified 03/02/2017     Priority: Medium     Alcohol intoxication, with unspecified complication (H) 03/02/2017     Priority: Medium     Homeless single person 06/20/2016     Priority: Medium     Poisoning by benzodiazepines, undetermined, initial encounter 02/22/2016     Priority: Medium     Generalized anxiety disorder 09/28/2015     Priority: Medium     Diagnosis updated by automated process. Provider to review and confirm.       Alcohol abuse 09/14/2015     Priority: Medium     Lower urinary tract infectious disease 11/27/2014     Priority: Medium     Leukocytosis 11/27/2014     Priority: Medium     Nodule of right lung 11/26/2014     Priority: Medium     Overview:   Repeat CT chest recommended in 6 months, 12 months and 24 months       Kidney stone 11/26/2014     Priority: Medium     Opioid abuse, in remission (H) 03/07/2014     Priority: Medium     History of heroin  abuse (H) 03/07/2014     Priority: Medium     Controlled substance agreement signed 08/14/2013     Priority: Medium     Overview:   Was getting alprazolam from an outside provider. Will not prescribe controlled substances for pt.        Seasonal allergies 03/12/2013     Priority: Medium     Insomnia 03/12/2013     Priority: Medium     Moderate major depression (H) 01/17/2011     Priority: Medium     CARDIOVASCULAR SCREENING; LDL GOAL LESS THAN 160 10/31/2010     Priority: Medium       Current Medications:  buprenorphine HCl-naloxone HCl (SUBOXONE) 4-1 MG per film, Take 1/2 film in the AM, 1 film at Noon and 1/2 film in the PM  escitalopram (LEXAPRO) 10 MG tablet, Take 1 tablet (10 mg) by mouth daily  escitalopram (LEXAPRO) 20 MG tablet, Take 1 tablet (20 mg) by mouth daily In addition to 10 mg tab for a total of 30 mg daily.  gabapentin (NEURONTIN) 300 MG capsule, Take 1 capsule (300 mg) by mouth 4 times daily  glecaprevir-pibrentasvir (MAVYRET) 100-40 MG per tablet, Take 3 tablets by mouth daily  ibuprofen (ADVIL/MOTRIN) 800 MG tablet, Take 1 tablet (800 mg) by mouth every 8 hours as needed for moderate pain  Lidocaine (LIDOCARE) 4 % Patch, Place 1 patch onto the skin every 24 hours To prevent lidocaine toxicity, patient should be patch free for 12 hrs daily.  naloxone (NARCAN) 4 MG/0.1ML nasal spray, Spray 1 spray (4 mg) into one nostril alternating nostrils as needed for opioid reversal every 2-3 minutes until assistance arrives  nicotine (NICORETTE) 4 MG lozenge, Place 1 lozenge (4 mg) inside cheek as needed for smoking cessation  polyethylene glycol (MIRALAX) 17 GM/SCOOP powder, Take 17 g by mouth daily as needed for constipation  propranolol ER (INDERAL LA) 80 MG 24 hr capsule, Take 80 mg by mouth 2 times daily  SUBOXONE 2-0.5 MG per film, Place 1 Film under the tongue 3 times daily    No current facility-administered medications on file prior to visit.       REVIEW OF SYSTEMS:  General:  No acute withdrawal  symptoms.  No recent infections or fever  Eyes/ENT:  No vision concerns.  No double vision.    Resp: No coughing, wheezing or shortness of breath  CV: No chest pains or palpitations  GI: No nausea, vomiting, abdominal pain, diarrhea.  No constipation  : No urinary frequency or dysuria    Musculoskeletal: No significant muscle or joint pains other than as above.  No edema  Neurologic: No numbness, tingling, weakness, problems with balance or coordination  Psychiatric: No acute concerns other than as above.   Skin: No rashes or areas of acute infection      OBJECTIVE                                                    LABS:  Labs reviewed. No UDS collected as patient evaluated over VIDEO visit.     PHYSICAL EXAM:  GENERAL: Healthy, alert and no distress  EYES: Eyes grossly normal to inspection.  No discharge or erythema, or obvious scleral/conjunctival abnormalities.  RESP: No audible wheeze, cough, or visible cyanosis.  No visible retractions or increased work of breathing.    SKIN: Visible skin clear. No significant rash, abnormal pigmentation or lesions.  NEURO: Cranial nerves grossly intact.  Mentation and speech appropriate for age.  PSYCH: Mentation appears normal, affect normal/bright, judgement and insight intact, normal speech and appearance well-groomed.      MENTAL STATUS EXAM:  Appearance:  awake, alert, adequately groomed, cooperative and no apparent distress  Attitude:  cooperative  Mood:  good  Affect:  appropriate and in normal range and mood congruent  Psychomotor Behavior:  no evidence of tardive dyskinesia, dystonia, or tics  Thought Content:  no evidence of suicidal ideation or homicidal ideation, no evidence of psychotic thought, no auditory hallucinations present and no visual hallucinations present  Insight:  good  Judgement:  fair  Oriented to:  time, person, and place    ASSESSMENT/PLAN                                                    (F11.20) Opioid use disorder, severe, dependence (H)  with chronic pain  (primary encounter diagnosis)  Comment: hx of pain pill use and a few episodes of heroin use.  UDS regularly at [facility] and [facility] program.  States pain better managed with current suboxone dose and is doing well at sober house/with work schedule.       Plan: Continue buprenorphine HCl-naloxone HCl (SUBOXONE) 4-1MG per film; place 1/2 film under the tongue in the AM, 1 film at noon and 1/2 film in PM. #60  Will continue to monitor UDS at in person visits to verify buprenorphine in urine as there is concern for hx of diversion of suboxone given hx of stopping suboxone shortly after receiving past prescriptions and negative UDS for buprenorphine at his last visit which is unusual given his 6 mg daily dose and reported strict adherence.     Continue with sober living in [place],  work, volunteering while adhering to social distancing and staying engaged in the recovery community.       (F10.20) Alcohol use disorder, severe, in controlled environment (H)  (F19.10) Polysubstance abuse (H)  Comment: hx stimulant, alcohol and pain pill/heroin use.  Hx long-term rx benzodiazepine use without reported abuse or misuse.    Plan: Continue with sober living in [place], work schedule, as well as sponsorship and recovery community engagement.     Continue with complete abstinence including refraining from prescription or illicit benzodiazepine use.      (Z79.899) High risk medication use  Comment: suboxone  Plan: UDS per treatment center and sober [place], monitor for cravings and return to use as well as pain control       ENCOUNTER FOR LONG TERM USE OF HIGH RISK MEDICATION   High Risk Drug Monitoring?  YES   Drug being monitored: Buprenorphine   Reason for drug: Opioid use disorder   What is being monitored?: Dosage, Cravings, Trigger, side effects, and continued abstinence.    Follow-up: 1 month via video     Video-Visit Details    Type of service:  Video Visit    Video Start Time: 11:51 AM  Video End  Time: 12:05 PM    Originating Location (pt. Location): Home    Distant Location (provider location):  [facility]     Mode of Communication:  Video Conference via [employer] iPhone application    As the provider I attest to compliance with applicable laws and regulations related to telemedicine and that the above documentation accurately summarizes the assessments, treatment plan, and patient education completed during this visit.         Linda Benoit, CNP  [facility] - Addiction Medicine  [facility]  [phone]

## 2020-08-07 NOTE — TELEPHONE ENCOUNTER
[place], [place] active [facility] insurance.    Gisel Quiñones RN on 8/7/2020 at 10:06 AM

## 2020-08-20 NOTE — PROGRESS NOTES
Attempted to reach patient to remind that SVR 12 labs are due, no answer, message left requesting call back, number provided.

## 2020-08-28 ENCOUNTER — OFFICE VISIT (OUTPATIENT)
Dept: ADDICTION MEDICINE | Facility: CLINIC | Age: 40
End: 2020-08-28
Payer: COMMERCIAL

## 2020-08-28 ENCOUNTER — OFFICE VISIT (OUTPATIENT)
Dept: FAMILY MEDICINE | Facility: CLINIC | Age: 40
End: 2020-08-28
Payer: COMMERCIAL

## 2020-08-28 VITALS
TEMPERATURE: 98.3 F | HEART RATE: 69 BPM | DIASTOLIC BLOOD PRESSURE: 71 MMHG | WEIGHT: 221.2 LBS | BODY MASS INDEX: 30 KG/M2 | SYSTOLIC BLOOD PRESSURE: 110 MMHG

## 2020-08-28 VITALS
DIASTOLIC BLOOD PRESSURE: 68 MMHG | SYSTOLIC BLOOD PRESSURE: 110 MMHG | WEIGHT: 221 LBS | TEMPERATURE: 98.3 F | BODY MASS INDEX: 29.97 KG/M2 | HEART RATE: 70 BPM | OXYGEN SATURATION: 98 %

## 2020-08-28 DIAGNOSIS — F11.20 OPIOID USE DISORDER, SEVERE, DEPENDENCE (H): Primary | ICD-10-CM

## 2020-08-28 DIAGNOSIS — H93.8X3 PRESSURE SENSATION IN BOTH EARS: ICD-10-CM

## 2020-08-28 DIAGNOSIS — F19.10 POLYSUBSTANCE ABUSE (H): ICD-10-CM

## 2020-08-28 DIAGNOSIS — F10.20 ALCOHOL USE DISORDER, SEVERE, IN CONTROLLED ENVIRONMENT (H): ICD-10-CM

## 2020-08-28 DIAGNOSIS — F32.1 MODERATE MAJOR DEPRESSION (H): ICD-10-CM

## 2020-08-28 DIAGNOSIS — L73.9 FOLLICULITIS: ICD-10-CM

## 2020-08-28 DIAGNOSIS — B18.2 CHRONIC HEPATITIS C WITHOUT HEPATIC COMA (H): ICD-10-CM

## 2020-08-28 DIAGNOSIS — B18.2 CHRONIC HEPATITIS C WITHOUT HEPATIC COMA (H): Primary | ICD-10-CM

## 2020-08-28 DIAGNOSIS — Z79.899 HIGH RISK MEDICATION USE: ICD-10-CM

## 2020-08-28 LAB
ALBUMIN SERPL-MCNC: 4.1 G/DL (ref 3.4–5)
ALP SERPL-CCNC: 69 U/L (ref 40–150)
ALT SERPL W P-5'-P-CCNC: 23 U/L (ref 0–70)
AMPHETAMINES UR QL: NOT DETECTED NG/ML
ANION GAP SERPL CALCULATED.3IONS-SCNC: 5 MMOL/L (ref 3–14)
AST SERPL W P-5'-P-CCNC: 16 U/L (ref 0–45)
BARBITURATES UR QL SCN: NOT DETECTED NG/ML
BASOPHILS # BLD AUTO: 0 10E9/L (ref 0–0.2)
BASOPHILS NFR BLD AUTO: 0.3 %
BENZODIAZ UR QL SCN: NOT DETECTED NG/ML
BILIRUB SERPL-MCNC: 0.6 MG/DL (ref 0.2–1.3)
BUN SERPL-MCNC: 15 MG/DL (ref 7–30)
BUPRENORPHINE UR QL: ABNORMAL NG/ML
CALCIUM SERPL-MCNC: 9.3 MG/DL (ref 8.5–10.1)
CANNABINOIDS UR QL: NOT DETECTED NG/ML
CHLORIDE SERPL-SCNC: 109 MMOL/L (ref 94–109)
CO2 SERPL-SCNC: 26 MMOL/L (ref 20–32)
COCAINE UR QL SCN: NOT DETECTED NG/ML
CREAT SERPL-MCNC: 0.97 MG/DL (ref 0.66–1.25)
D-METHAMPHET UR QL: NOT DETECTED NG/ML
DIFFERENTIAL METHOD BLD: NORMAL
EOSINOPHIL # BLD AUTO: 0 10E9/L (ref 0–0.7)
EOSINOPHIL NFR BLD AUTO: 0.5 %
ERYTHROCYTE [DISTWIDTH] IN BLOOD BY AUTOMATED COUNT: 13.9 % (ref 10–15)
GFR SERPL CREATININE-BSD FRML MDRD: >90 ML/MIN/{1.73_M2}
GLUCOSE SERPL-MCNC: 66 MG/DL (ref 70–99)
HCT VFR BLD AUTO: 45.9 % (ref 40–53)
HGB BLD-MCNC: 15.9 G/DL (ref 13.3–17.7)
LYMPHOCYTES # BLD AUTO: 1.8 10E9/L (ref 0.8–5.3)
LYMPHOCYTES NFR BLD AUTO: 29.4 %
MCH RBC QN AUTO: 28.7 PG (ref 26.5–33)
MCHC RBC AUTO-ENTMCNC: 34.6 G/DL (ref 31.5–36.5)
MCV RBC AUTO: 83 FL (ref 78–100)
METHADONE UR QL SCN: NOT DETECTED NG/ML
MONOCYTES # BLD AUTO: 0.4 10E9/L (ref 0–1.3)
MONOCYTES NFR BLD AUTO: 7 %
NEUTROPHILS # BLD AUTO: 3.9 10E9/L (ref 1.6–8.3)
NEUTROPHILS NFR BLD AUTO: 62.8 %
OPIATES UR QL SCN: NOT DETECTED NG/ML
OXYCODONE UR QL SCN: NOT DETECTED NG/ML
PCP UR QL SCN: NOT DETECTED NG/ML
PLATELET # BLD AUTO: 306 10E9/L (ref 150–450)
POTASSIUM SERPL-SCNC: 4.1 MMOL/L (ref 3.4–5.3)
PROPOXYPH UR QL: NOT DETECTED NG/ML
PROT SERPL-MCNC: 8 G/DL (ref 6.8–8.8)
RBC # BLD AUTO: 5.54 10E12/L (ref 4.4–5.9)
SODIUM SERPL-SCNC: 140 MMOL/L (ref 133–144)
TRICYCLICS UR QL SCN: NOT DETECTED NG/ML
WBC # BLD AUTO: 6.2 10E9/L (ref 4–11)

## 2020-08-28 PROCEDURE — 99214 OFFICE O/P EST MOD 30 MIN: CPT | Performed by: PEDIATRICS

## 2020-08-28 PROCEDURE — 80306 DRUG TEST PRSMV INSTRMNT: CPT | Performed by: PEDIATRICS

## 2020-08-28 PROCEDURE — 85025 COMPLETE CBC W/AUTO DIFF WBC: CPT | Performed by: FAMILY MEDICINE

## 2020-08-28 PROCEDURE — 36415 COLL VENOUS BLD VENIPUNCTURE: CPT | Performed by: FAMILY MEDICINE

## 2020-08-28 PROCEDURE — 87522 HEPATITIS C REVRS TRNSCRPJ: CPT | Performed by: FAMILY MEDICINE

## 2020-08-28 PROCEDURE — 99214 OFFICE O/P EST MOD 30 MIN: CPT | Performed by: FAMILY MEDICINE

## 2020-08-28 PROCEDURE — 80053 COMPREHEN METABOLIC PANEL: CPT | Performed by: FAMILY MEDICINE

## 2020-08-28 RX ORDER — BUPRENORPHINE AND NALOXONE 8; 2 MG/1; MG/1
FILM, SOLUBLE BUCCAL; SUBLINGUAL
Qty: 15 FILM | Refills: 0 | Status: SHIPPED | OUTPATIENT
Start: 2020-08-28 | End: 2020-09-11 | Stop reason: DRUGHIGH

## 2020-08-28 RX ORDER — CEPHALEXIN 500 MG/1
500 CAPSULE ORAL 2 TIMES DAILY
Qty: 14 CAPSULE | Refills: 0 | Status: SHIPPED | OUTPATIENT
Start: 2020-08-28 | End: 2020-09-04

## 2020-08-28 ASSESSMENT — PATIENT HEALTH QUESTIONNAIRE - PHQ9: SUM OF ALL RESPONSES TO PHQ QUESTIONS 1-9: 0

## 2020-08-28 NOTE — LETTER
August 31, 2020       TO: Edgar Melendezkamaljit  [id number] [facility] [id number]       Dear Mr. Williamson,    We are writing to inform you of your test results.    Congratulations, your Hepatitis C has been cured.     If a cure is achieved:    1) You are no longer considered to be infectious for Hepatitis C.     2) A cure does not mean you have immunity. You can still be reinfected if you come into contact with infected blood. Avoiding sex with infected people, avoid reusing needles and razors that came into contact with infected blood.     3) You will always test positive for the Hepatitis C antibody. Your Hepatitis C RNA Quant (viral level) will remain undetected as long as you avoid risks for reinfection.     Please let me know if you have any questions or concerns.     Clinic Staff - [phone] option 3     Sincerely,     Nathan Christensen PA-C   [address] [facility], Mail [id number]  [place]  [address].

## 2020-08-28 NOTE — PROGRESS NOTES
Initial visit with me on    HPI:    8/6/2020  Patient status since last visit: improving     Cravings: denies    MAT Dose: adequate    Side Effects: none    Cues to use and relapse triggers: None     Everything has been going well.  Still in sober living in [place].  Spending time with friends on the weekends and working during the week.  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  No substance use or challenges the last month he hasn't been able to manage.      He reports he now qualifies for insurance through work but wants to continue with [employer].  He states he got an extension on his insurance until the end of this month to prevent lapse in care.  He is working with [employer] to apply and select health plan.        Goes to meetings after work 2-3 days a week and working with his sponsor still.  Some back stiffness/soreness after heavy lifting at work.  Otherwise suboxone helping manage pain without side effects.  He will have 6 months sober tomorrow.       Tobacco use - vaping daily and smoking 1 cigarette daily         SUBJECTIVE:                                                    BUPRENORPHINE FOLLOW UP:    Edgar Williamson is a 40 year old male who presents to clinic today for follow up of Buprenorphine.        Date of last visit:  8/28/2020    Primary Care Provider: Charlee Hayes MD  [place] Board of Pharmacy Data Base Reviewed:    Yes; reviewed today       8/15/20 m.g 300mg  # 180   8/7/20 Suboxone 4mg # 10 + 8/10 # 50        Brief History:  12/13/2019 when UDS was positive for amphetamines and methamphetamine.  History of opioid and alcohol use disorders, major depression, generalized anxiety, initial insomnia, and chronic pain secondary to osteoarthritis of lumbar spine.  No alcohol use since 2015.  Completed treatment at [facility] ([place], [place]) in 11/2015 after detox stay at [facility].  No hx seizures or DTs.  Abusing opioid pain pills since 2011.  Once he stopped  drinking in 2015 he started using more opioid pain pills and self-medicating back pain using up to 60 mg oxycontin daily (smoking) and tried heroin a few times.  No hx IVDU.  History of hallucinogen and cocaine use in 2009 and earlier.  Longest period of sobriety 4 years.  Hx of suboxone maintenance through [facility] for 4 months and was taking 16-4mg daily.      Psychiatric provider - Dr Cody at [facility] who manages his Lexapro 30 mg daily, propranolol ER 80 mg BID.      Hepatitis C Status -  02/19/2020 reactive and HCV RNA quant 12,443 international unit(s)/mL.  Completed treatment.      Date of last use:   2/8/2020 - heroin, alcohol, methamphetamine        HPI:    8/28/2020    Patient is seen today as a work in appointment.  He had called for a buprenorphine bridge.  Apparently he usually keeps his medication in a lock box at the sober house.  He reports there was someone who got kicked out of the sober house who was returning to collect his belongings and patient did not want to leave his medication there so we had it with him.  He was on the bus and fell asleep.  When he awoke and hurriedly left the bus at his stop he somehow lost the medication.  He should have enough medication to get through until 9/7/20.  In reviewing charting he there have been some inconsistencies with not taking the medication as prescribed.  He denies any substance use.  He states he is still living in the sober house.  He feels like things are going well.  He has an appointment scheduled on 9/11/2020 with his regular provider.  U tox today is positive for buprenorphine and negative for other substances.  He reports attending meetings.  Denies any other concerns today.            Social History     Social History Narrative    Updated : 12/13/2019    Living Situation:  Rents an apartment in [place], lives alone or with 10 yo son when he visits    Marital status:     Children: two children, shared custody and pays child support     Employment/financial status: currently not working due to pain - working on GED and may return to work as  once pain is stable     Current recovery program (meetings, sponsorship): None currently         Exercise/activity: Walks frequently, no regular exercise plan    Diet/food security: Stable, able to access food    Transportation: Own vehicle        Legal history: Denies       Patient Active Problem List    Diagnosis Date Noted     Hepatitis C, chronic (H) 08/28/2020     Priority: Medium     Primary osteoarthritis of lumbar spine 04/23/2019     Priority: Medium     Strain of lumbar region, initial encounter 04/23/2019     Priority: Medium     Benign meningioma of brain (H)      Priority: Medium     Stasis dermatitis of both legs 12/22/2017     Priority: Medium     Trichiasis of right lower eyelid 08/03/2017     Priority: Medium     Eyelid laceration, right, initial encounter 03/02/2017     Priority: Medium     Altered mental status, unspecified 03/02/2017     Priority: Medium     Alcohol intoxication, with unspecified complication (H) 03/02/2017     Priority: Medium     Homeless single person 06/20/2016     Priority: Medium     Poisoning by benzodiazepines, undetermined, initial encounter 02/22/2016     Priority: Medium     Generalized anxiety disorder 09/28/2015     Priority: Medium     Diagnosis updated by automated process. Provider to review and confirm.       Alcohol abuse 09/14/2015     Priority: Medium     Lower urinary tract infectious disease 11/27/2014     Priority: Medium     Leukocytosis 11/27/2014     Priority: Medium     Nodule of right lung 11/26/2014     Priority: Medium     Overview:   Repeat CT chest recommended in 6 months, 12 months and 24 months       Kidney stone 11/26/2014     Priority: Medium     Opioid abuse, in remission (H) 03/07/2014     Priority: Medium     History of heroin abuse (H) 03/07/2014     Priority: Medium     Controlled substance agreement signed 08/14/2013      Priority: Medium     Overview:   Was getting alprazolam from an outside provider. Will not prescribe controlled substances for pt.        Seasonal allergies 03/12/2013     Priority: Medium     Insomnia 03/12/2013     Priority: Medium     Moderate major depression (H) 01/17/2011     Priority: Medium     CARDIOVASCULAR SCREENING; LDL GOAL LESS THAN 160 10/31/2010     Priority: Medium       Problem list and histories reviewed & adjusted, as indicated.  Additional history: as documented      buprenorphine HCl-naloxone HCl (SUBOXONE) 4-1 MG per film, Take 1/2 film in the AM, 1 film at Noon and 1/2 film in the PM  cephALEXin (KEFLEX) 500 MG capsule, Take 1 capsule (500 mg) by mouth 2 times daily for 7 days  escitalopram (LEXAPRO) 10 MG tablet, Take 1 tablet (10 mg) by mouth daily  escitalopram (LEXAPRO) 20 MG tablet, Take 1 tablet (20 mg) by mouth daily In addition to 10 mg tab for a total of 30 mg daily.  gabapentin (NEURONTIN) 300 MG capsule, Take 1 capsule (300 mg) by mouth 4 times daily  glecaprevir-pibrentasvir (MAVYRET) 100-40 MG per tablet, Take 3 tablets by mouth daily  ibuprofen (ADVIL/MOTRIN) 800 MG tablet, Take 1 tablet (800 mg) by mouth every 8 hours as needed for moderate pain  Lidocaine (LIDOCARE) 4 % Patch, Place 1 patch onto the skin every 24 hours To prevent lidocaine toxicity, patient should be patch free for 12 hrs daily.  naloxone (NARCAN) 4 MG/0.1ML nasal spray, Spray 1 spray (4 mg) into one nostril alternating nostrils as needed for opioid reversal every 2-3 minutes until assistance arrives  nicotine (NICORETTE) 4 MG lozenge, Place 1 lozenge (4 mg) inside cheek as needed for smoking cessation  propranolol ER (INDERAL LA) 80 MG 24 hr capsule, Take 80 mg by mouth 2 times daily    No current facility-administered medications on file prior to visit.       Allergies   Allergen Reactions     Diphenhydramine Other (See Comments)     Restless Legs/Feet  Other reaction(s): Restless Legs/Feet  Restless  legs  Other reaction(s): Restless Legs/Feet       Methadone Other (See Comments)     Had terrible itching and redness and was pulled off methadone     Mirtazapine Other (See Comments)     Restless Legs/Feet  Other reaction(s): Restless Legs/Feet  Restless legs.  Other reaction(s): Restless Legs/Feet       Seasonal Allergies      Trazodone Other (See Comments) and Unknown     Behavioral Disturbances  Other reaction(s): Behavioral Disturbances  Behavorial disturbance.  - restless legs             REVIEW OF SYSTEMS:  General:  No acute withdrawal symptoms.  No recent infections or fever  Eyes:  No vision concerns.  No double vision.    Resp: No coughing, wheezing or shortness of breath  CV: No chest pains or palpitations  GI: No nausea, vomiting, abdominal pain, diarrhea.  No constipation  : No urinary frequency or dysuria    Musculoskeletal: No significant muscle or joint pains other than as above.  No edema  Neurologic: No numbness, tingling, weakness, problems with balance or coordination  Psychiatric: No acute concerns other than as above.   Skin: No rashes or areas of acute infection    OBJECTIVE:    PHYSICAL EXAM:  /68   Pulse 70   Temp 98.3  F (36.8  C) (Oral)   Wt 100.2 kg (221 lb)   SpO2 98%   BMI 29.97 kg/m      GENERAL APPEARANCE:  alert, comfortable appearing  EYES:Eyes grossly normal to inspection  NEURO:  Gait normal.  No tremor. Coordination intact.   MENTAL STATUS EXAM:  Appearance/Behavior: No appearant distress  Speech: Normal  Mood/Affect: normal affect  Insight: Adequate      Results for orders placed or performed in visit on 08/28/20   Urine Drugs of Abuse Screen Panel 13     Status: Abnormal   Result Value Ref Range    Cannabinoids (86-szd-5-carboxy-9-THC) Not Detected NDET^Not Detected ng/mL    Phencyclidine (Phencyclidine) Not Detected NDET^Not Detected ng/mL    Cocaine (Benzoylecgonine) Not Detected NDET^Not Detected ng/mL    Methamphetamine (d-Methamphetamine) Not Detected  NDET^Not Detected ng/mL    Opiates (Morphine) Not Detected NDET^Not Detected ng/mL    Amphetamine (d-Amphetamine) Not Detected NDET^Not Detected ng/mL    Benzodiazepines (Nordiazepam) Not Detected NDET^Not Detected ng/mL    Tricyclic Antidepressants (Desipramine) Not Detected NDET^Not Detected ng/mL    Methadone (Methadone) Not Detected NDET^Not Detected ng/mL    Barbiturates (Butalbital) Not Detected NDET^Not Detected ng/mL    Oxycodone (Oxycodone) Not Detected NDET^Not Detected ng/mL    Propoxyphene (Norpropoxyphene) Not Detected NDET^Not Detected ng/mL    Buprenorphine (Buprenorphine) Detected, Abnormal Result (A) NDET^Not Detected ng/mL   Results for orders placed or performed in visit on 08/28/20   CBC with platelets and differential     Status: None   Result Value Ref Range    WBC 6.2 4.0 - 11.0 10e9/L    RBC Count 5.54 4.4 - 5.9 10e12/L    Hemoglobin 15.9 13.3 - 17.7 g/dL    Hematocrit 45.9 40.0 - 53.0 %    MCV 83 78 - 100 fl    MCH 28.7 26.5 - 33.0 pg    MCHC 34.6 31.5 - 36.5 g/dL    RDW 13.9 10.0 - 15.0 %    Platelet Count 306 150 - 450 10e9/L    % Neutrophils 62.8 %    % Lymphocytes 29.4 %    % Monocytes 7.0 %    % Eosinophils 0.5 %    % Basophils 0.3 %    Absolute Neutrophil 3.9 1.6 - 8.3 10e9/L    Absolute Lymphocytes 1.8 0.8 - 5.3 10e9/L    Absolute Monocytes 0.4 0.0 - 1.3 10e9/L    Absolute Eosinophils 0.0 0.0 - 0.7 10e9/L    Absolute Basophils 0.0 0.0 - 0.2 10e9/L    Diff Method Automated Method            ASSESSMENT/PLAN:    (F11.20) Opioid use disorder, severe, dependence (H) with chronic pain  (primary encounter diagnosis)  Comment: hx of pain pill use and a few episodes of heroin use.  UDS regularly at [facility].  States pain better managed with current suboxone dose and is doing well at sober house/with work schedule.       Plan: Continue buprenorphine HCl-naloxone HCl (SUBOXONE) -changed to 8/2 mg  films with the hope that insurance will cover.   Continue to monitor for  diversion or misuse.   Safekeeping a lot container reviewed.    Continue with sober living in [place],  work, volunteering while adhering to social distancing and staying engaged in the recovery community.       (F10.20) Alcohol use disorder, severe, in controlled environment (H)  (F19.10) Polysubstance abuse (H)  Comment: hx stimulant, alcohol and pain pill/heroin use.  Hx long-term rx benzodiazepine use without reported abuse or misuse.    Plan: Continue with sober living in [place], work schedule, as well as sponsorship and recovery community engagement.     Continue with complete abstinence including refraining from prescription or illicit benzodiazepine use.      (Z79.899) High risk medication use  Comment: suboxone  Plan: UDS per treatment center and sober house, monitor for cravings and return to use as well as pain control         (Z79.899) High risk medication use   Plan: High Risk Drug Monitoring?  YES   Drug being monitored: Buprenorphine   Reason for drug: Opioid use disorder   What is being monitored?: Dosage, Cravings, Trigger, side effects, and continued abstinence.        Ainsley Kiser MD  [facility] - Addiction Medicine  [facility] [phone]

## 2020-08-28 NOTE — PROGRESS NOTES
Subjective     Edgar Williamson is a 40 year old male who presents to clinic today for the following health issues:    HPI   Patient with history of hepatitis C.  Recently had finished 3 months of treatment. He was on Mavyret. Need labs follow up. Finished over 12 weeks ago.  Has no fever, no nausea, no stomach upset.  Denies nausea or vomiting.  No weight loss.    Concern of folliculitis to the most on his head, scalp he reports he does shave his head at least once a week.  He has no other skin rashes.  No itchy spot, denies sore throat, denies recent sickness.    Concern of fullness pressure in his ears.  Concern of ear wax.  Denies pain no discharge.  He does wear earplugs at work.  Sometimes use a Q-tip.  He denies sneezing, coughing, denies being lightheaded or dizzy.    Derm Problem  Onset/Duration: x4-5 months  Description  Location: head and face  Character: round, raised, red  Itching: mild  Intensity:  moderate  Progression of Symptoms:  same  Accompanying signs and symptoms:   Fever: no  Body aches or joint pain: no  Sore throat symptoms: no  Recent cold symptoms: no  History:           Previous episodes of similar rash: None  New exposures:  None  Recent travel: no  Exposure to similar rash: no  Precipitating or alleviating factors: none  Therapies tried and outcome: none    Pt needs lab work completed.     Review of Systems   Constitutional, HEENT, cardiovascular, pulmonary, GI, , musculoskeletal, neuro, skin, endocrine and psych systems are negative, except as otherwise noted.      Objective    There were no vitals taken for this visit.  There is no height or weight on file to calculate BMI.  Physical Exam   GENERAL: healthy, alert and no distress  HENT: ear canals and TM's normal, nose and mouth without ulcers or lesions  NECK: no adenopathy, no asymmetry, masses, or scars and thyroid normal to palpation  RESP: lungs clear to auscultation - no rales, rhonchi or wheezes  MS: no gross  musculoskeletal defects noted, no edema  SKIN: excoriation - scalp, more Folliculitis  PSYCH: mentation appears normal, affect normal/bright    Orders Placed This Encounter   Procedures     Comprehensive metabolic panel (BMP + Alb, Alk Phos, ALT, AST, Total. Bili, TP)     CBC with platelets and differential     Hepatitis C RNA, quantitative           Assessment & Plan     Chronic hepatitis C without hepatic coma (H)  Patient had completed 12 weeks of treatment.  Currently denies any symptoms.  We will repeat his labs today.  - Comprehensive metabolic panel (BMP + Alb, Alk Phos, ALT, AST, Total. Bili, TP)  - CBC with platelets and differential  - Hepatitis C RNA, quantitative    Folliculitis  Advised patient to wash his hand with antibacterial soap prior to shaving.  Was advised to change shaving blade more frequent.  - cephALEXin (KEFLEX) 500 MG capsule; Take 1 capsule (500 mg) by mouth 2 times daily for 7 days    Pressure sensation in both ears  Ear exam was normal today.  No wax.  Discussed with patient if symptoms continue may refer to ENT.       BMI:   Estimated body mass index is 30 kg/m  as calculated from the following:    Height as of 3/25/20: 1.829 m (6').    Weight as of this encounter: 100.3 kg (221 lb 3.2 oz).   Weight management plan: Discussed healthy diet and exercise guidelines        There are no Patient Instructions on file for this visit.    No follow-ups on file.    Charlee Hayes MD  [facility]

## 2020-08-28 NOTE — LETTER
[facility]   [address] [place]  [address]  [phone]                                   September 1, 2020    Edgar Williamson  [address]  [place] [address]        Dear Edgar,    Viral load for hep C Not detected today   Normal liver enzyme   Normal CBC   I have forwarded this result to his GI   Follow up in 6 months to monitor/ repeat labs   thanks     Results for orders placed or performed in visit on 08/28/20   Urine Drugs of Abuse Screen Panel 13     Status: Abnormal   Result Value Ref Range    Cannabinoids (43-cvx-2-carboxy-9-THC) Not Detected NDET^Not Detected ng/mL    Phencyclidine (Phencyclidine) Not Detected NDET^Not Detected ng/mL    Cocaine (Benzoylecgonine) Not Detected NDET^Not Detected ng/mL    Methamphetamine (d-Methamphetamine) Not Detected NDET^Not Detected ng/mL    Opiates (Morphine) Not Detected NDET^Not Detected ng/mL    Amphetamine (d-Amphetamine) Not Detected NDET^Not Detected ng/mL    Benzodiazepines (Nordiazepam) Not Detected NDET^Not Detected ng/mL    Tricyclic Antidepressants (Desipramine) Not Detected NDET^Not Detected ng/mL    Methadone (Methadone) Not Detected NDET^Not Detected ng/mL    Barbiturates (Butalbital) Not Detected NDET^Not Detected ng/mL    Oxycodone (Oxycodone) Not Detected NDET^Not Detected ng/mL    Propoxyphene (Norpropoxyphene) Not Detected NDET^Not Detected ng/mL    Buprenorphine (Buprenorphine) Detected, Abnormal Result (A) NDET^Not Detected ng/mL   Results for orders placed or performed in visit on 08/28/20   Comprehensive metabolic panel (BMP + Alb, Alk Phos, ALT, AST, Total. Bili, TP)     Status: Abnormal   Result Value Ref Range    Sodium 140 133 - 144 mmol/L    Potassium 4.1 3.4 - 5.3 mmol/L    Chloride 109 94 - 109 mmol/L    Carbon Dioxide 26 20 - 32 mmol/L    Anion Gap 5 3 - 14 mmol/L    Glucose 66 (L) 70 - 99 mg/dL    Urea Nitrogen 15 7 - 30 mg/dL    Creatinine 0.97 0.66 - 1.25 mg/dL    GFR Estimate >90 >60  mL/min/[1.73_m2]    GFR Estimate If Black >90 >60 mL/min/[1.73_m2]    Calcium 9.3 8.5 - 10.1 mg/dL    Bilirubin Total 0.6 0.2 - 1.3 mg/dL    Albumin 4.1 3.4 - 5.0 g/dL    Protein Total 8.0 6.8 - 8.8 g/dL    Alkaline Phosphatase 69 40 - 150 U/L    ALT 23 0 - 70 U/L    AST 16 0 - 45 U/L   CBC with platelets and differential     Status: None   Result Value Ref Range    WBC 6.2 4.0 - 11.0 10e9/L    RBC Count 5.54 4.4 - 5.9 10e12/L    Hemoglobin 15.9 13.3 - 17.7 g/dL    Hematocrit 45.9 40.0 - 53.0 %    MCV 83 78 - 100 fl    MCH 28.7 26.5 - 33.0 pg    MCHC 34.6 31.5 - 36.5 g/dL    RDW 13.9 10.0 - 15.0 %    Platelet Count 306 150 - 450 10e9/L    % Neutrophils 62.8 %    % Lymphocytes 29.4 %    % Monocytes 7.0 %    % Eosinophils 0.5 %    % Basophils 0.3 %    Absolute Neutrophil 3.9 1.6 - 8.3 10e9/L    Absolute Lymphocytes 1.8 0.8 - 5.3 10e9/L    Absolute Monocytes 0.4 0.0 - 1.3 10e9/L    Absolute Eosinophils 0.0 0.0 - 0.7 10e9/L    Absolute Basophils 0.0 0.0 - 0.2 10e9/L    Diff Method Automated Method    Hepatitis C RNA, quantitative     Status: None   Result Value Ref Range    HCV RNA Quant IU/ml HCV RNA Not Detected HCVND^HCV RNA Not Detected [IU]/mL    Log of HCV RNA Qt Not Calculated <1.2 Log IU/mL       If you have any questions please call the clinic at [phone]    Sincerely,    Charlee Hayes MD  bmd

## 2020-08-31 LAB
HCV RNA SERPL NAA+PROBE-ACNC: NORMAL [IU]/ML
HCV RNA SERPL NAA+PROBE-LOG IU: NORMAL LOG IU/ML

## 2020-09-01 NOTE — RESULT ENCOUNTER NOTE
Addended by: AVERY PARKER on: 9/8/2017 03:12 PM     Modules accepted: Orders     Letter sent to patient.

## 2020-09-11 ENCOUNTER — VIRTUAL VISIT (OUTPATIENT)
Dept: ADDICTION MEDICINE | Facility: CLINIC | Age: 40
End: 2020-09-11
Payer: COMMERCIAL

## 2020-09-11 DIAGNOSIS — F19.10 POLYSUBSTANCE ABUSE (H): ICD-10-CM

## 2020-09-11 DIAGNOSIS — F32.1 MODERATE MAJOR DEPRESSION (H): ICD-10-CM

## 2020-09-11 DIAGNOSIS — F11.20 OPIOID USE DISORDER, SEVERE, DEPENDENCE (H): Primary | ICD-10-CM

## 2020-09-11 DIAGNOSIS — F10.20 ALCOHOL USE DISORDER, SEVERE, IN CONTROLLED ENVIRONMENT (H): ICD-10-CM

## 2020-09-11 DIAGNOSIS — Z79.899 HIGH RISK MEDICATION USE: ICD-10-CM

## 2020-09-11 PROCEDURE — 99214 OFFICE O/P EST MOD 30 MIN: CPT | Mod: 95 | Performed by: NURSE PRACTITIONER

## 2020-09-11 RX ORDER — ESCITALOPRAM OXALATE 20 MG/1
20 TABLET ORAL DAILY
Qty: 30 TABLET | Refills: 1 | Status: SHIPPED | OUTPATIENT
Start: 2020-09-11 | End: 2020-12-11

## 2020-09-11 RX ORDER — BUPRENORPHINE AND NALOXONE 4; 1 MG/1; MG/1
1 FILM, SOLUBLE BUCCAL; SUBLINGUAL 3 TIMES DAILY
Qty: 90 FILM | Refills: 0 | Status: SHIPPED | OUTPATIENT
Start: 2020-09-11 | End: 2020-10-09

## 2020-09-11 RX ORDER — ESCITALOPRAM OXALATE 10 MG/1
10 TABLET ORAL DAILY
Qty: 30 TABLET | Refills: 1 | Status: SHIPPED | OUTPATIENT
Start: 2020-09-11 | End: 2020-10-16
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
## 2020-10-08 ENCOUNTER — TELEPHONE (OUTPATIENT)
Dept: ADDICTION MEDICINE | Facility: CLINIC | Age: 40
End: 2020-10-08

## 2020-10-08 DIAGNOSIS — F11.20 OPIOID USE DISORDER, SEVERE, DEPENDENCE (H): ICD-10-CM

## 2020-10-08 NOTE — TELEPHONE ENCOUNTER
Reason for Call:  Medication Request due to: missed appointment provider was not in the office that's why patient had to reschedule    **patient would like to know if medication is able to be bridged for it to be bridged before 9am tomorrow - he is going out of town tomorrow morning    Name of the pharmacy and phone number for the current request:           [id number] - [place], [place] - [address] AT [address]  [phone]         Request for bridge of: buprenorphine HCl-naloxone HCl (SUBOXONE) 4-1 MG per film    Other Information:   Taking as prescribed: Yes  Date/Time/ amount of last dose: 10/09 FD    Pt informed to follow up with pharmacy for status of refill as addiction RN will only reach out if there are any issues or questions and will be addressed within one business day.  Pt also informed that this request for a bridge is simply a request and doesn't guarantee the medication will be filled.    Can we leave a detailed message on this number? Yes    Phone number patient can be reached at: [phone]     Best Time:   Anytime

## 2020-10-09 RX ORDER — BUPRENORPHINE AND NALOXONE 4; 1 MG/1; MG/1
1 FILM, SOLUBLE BUCCAL; SUBLINGUAL 3 TIMES DAILY
Qty: 21 FILM | Refills: 0 | Status: SHIPPED | OUTPATIENT
Start: 2020-10-09 | End: 2020-10-16

## 2020-10-09 NOTE — TELEPHONE ENCOUNTER
Suboxone bridge rx. Last rx'd by Linda Benoit 9/11/20, one month rx.    Medication pended for 6 day supply.  Routing to covering provider.    Bridge request for: buprenorphine HCl-naloxone HCl (SUBOXONE) 4-1mg    Last Appointment: 9/11/20    Next Appointment: 10/16/20 in person    No Shows/Cancellations since last appointment: none    Last Refill in Epic (date and amount/how many days):   buprenorphine HCl-naloxone HCl (SUBOXONE) 4-1 MG per film 90 Film 0 9/11/2020  No   Sig - Route: Place 1 Film under the tongue 3 times daily - Sublingual     Most Recent UDS results: POS: buprenorphine on 8/28/20.     reviewed and summarized below:       Gisel Quiñones RN  10/09/20  11:03 AM

## 2020-10-09 NOTE — TELEPHONE ENCOUNTER
Reason for Call:  Other call back    Detailed comments: Patient called to check on the status of his bridge request. Please call patient back if there is any additional information needed or to give him an update once the bridge has been either approve or denied.     Phone Number Patient can be reached at: Home number on file [phone] (home)    Best Time: Any     Can we leave a detailed message on this number? YES    Call taken on 10/9/2020 at 9:38 AM by Alea Dodge

## 2020-10-16 ENCOUNTER — OFFICE VISIT (OUTPATIENT)
Dept: ADDICTION MEDICINE | Facility: CLINIC | Age: 40
End: 2020-10-16
Payer: COMMERCIAL

## 2020-10-16 VITALS
TEMPERATURE: 98 F | SYSTOLIC BLOOD PRESSURE: 100 MMHG | BODY MASS INDEX: 29.29 KG/M2 | DIASTOLIC BLOOD PRESSURE: 64 MMHG | WEIGHT: 216 LBS

## 2020-10-16 DIAGNOSIS — F11.20 OPIOID USE DISORDER, SEVERE, DEPENDENCE (H): Primary | ICD-10-CM

## 2020-10-16 PROCEDURE — 80306 DRUG TEST PRSMV INSTRMNT: CPT | Performed by: NURSE PRACTITIONER

## 2020-10-16 PROCEDURE — 99214 OFFICE O/P EST MOD 30 MIN: CPT | Performed by: NURSE PRACTITIONER

## 2020-10-16 RX ORDER — BUPRENORPHINE AND NALOXONE 4; 1 MG/1; MG/1
1 FILM, SOLUBLE BUCCAL; SUBLINGUAL DAILY
Qty: 90 FILM | Refills: 0 | Status: SHIPPED | OUTPATIENT
Start: 2020-11-13 | End: 2020-10-16

## 2020-10-16 RX ORDER — BUPRENORPHINE AND NALOXONE 4; 1 MG/1; MG/1
1 FILM, SOLUBLE BUCCAL; SUBLINGUAL 3 TIMES DAILY
Qty: 90 FILM | Refills: 0 | Status: SHIPPED | OUTPATIENT
Start: 2020-11-13 | End: 2020-12-11

## 2020-10-16 RX ORDER — BUPRENORPHINE AND NALOXONE 4; 1 MG/1; MG/1
1 FILM, SOLUBLE BUCCAL; SUBLINGUAL 3 TIMES DAILY
Qty: 90 FILM | Refills: 0 | Status: SHIPPED | OUTPATIENT
Start: 2020-10-16 | End: 2020-12-09

## 2020-10-16 NOTE — PROGRESS NOTES
SUBJECTIVE                                                    CC: Patient presents with:  Addiction Problem    BUPRENORPHINE FOLLOW UP: current dose 4-1 mg TID.     Edgar Williamson is a 40 year old male who presents to clinic today for follow up of Buprenorphine.      Date of last visit:  10/8/2020    Primary Care Provider: Charlee Hayes MD   [place] Board of Pharmacy Data Base Reviewed:    Yes; reviewed and no concerns for diversionary activity.  Most recent data includes:  10/09/2020 4 10/09/2020 10/09/2020 Suboxone 4 Mg-1 Mg Sl Film  21.00 7 Al [facility] [id number] [facility] ([id number]) 0/0 12.00 mg [place]  10/07/2020 4 04/29/2020 10/07/2020 Gabapentin 300 Mg Capsule  120.00 30 Mo [place] [id number] [facility] ([id number]) 2/0  [place]  09/14/2020 4 09/11/2020 09/14/2020 Suboxone 4 Mg-1 Mg Sl Film  75.00 30 Kr Abhijit    Brief History:    Initial visit with me on 12/13/2019 when UDS was positive for amphetamines and methamphetamine.  History of opioid and alcohol use disorders, major depression, generalized anxiety, initial insomnia, and chronic pain secondary to osteoarthritis of lumbar spine.  No alcohol use since 2015.  Completed treatment at [facility] in 11/2015 after detox stay at [facility].  No hx seizures or DTs.  Abusing opioid pain pills since 2011.  Once he stopped drinking in 2015 he started using more opioid pain pills and self-medicating back pain using up to 60 mg oxycontin daily (smoking) and tried heroin a few times.  No hx IVDU.  History of hallucinogen and cocaine use in 2009 and earlier.  Longest period of sobriety 4 years.  Hx of suboxone maintenance through STS for 4 months and was taking 16-4mg daily.      Psychiatric provider - Dr Cody at [facility] who manages his Lexapro 30 mg daily, propranolol ER 80 mg BID.      Hepatitis C Status -  02/19/2020 reactive and HCV RNA quant 12,443 international unit(s)/mL.  Completed treatment.      Date of last use:   2/8/2020 - heroin,  alcohol, methamphetamine    HPI:    10/16/2020  Patient status since last visit: improving    Cravings: denies     MAT Dose: adequate    Side Effects: none     Cues to use and relapse triggers: None at this time     Suboxone dose increased at last visit to improve chronic pain management.  He reports improved pain management with current suboxone dose.  He reports self-taper on lexapro due to not feeling he needs it now that stressors that were causing mood sx are no longer present.  He has had significant stability for some time, sober about 8 months and work is going well, has stable housing.  He is working on submitting paperwork in custody [place] with ex-partner which may increase stress in the coming months but he feels he has all the support he needs in sober house and from new sponsor.     He reports interest in continuing lexapro taper but understands risk of return of depression and anxiety symptoms.  He is considering waiting to taper dose further until after court and custody issues resolved which is recommended.      Tobacco use - vaping daily and smoking 1 pack of cigarettes a month; using nicotine lozenges daily as needed.       Social History:   Reviewed today.  See HPI for changes since last visit.     Problem list and histories reviewed & adjusted, as indicated.  Additional history: as documented    Patient Active Problem List    Diagnosis Date Noted     Hepatitis C, chronic (H) 08/28/2020     Priority: Medium     Primary osteoarthritis of lumbar spine 04/23/2019     Priority: Medium     Strain of lumbar region, initial encounter 04/23/2019     Priority: Medium     Benign meningioma of brain (H)      Priority: Medium     Stasis dermatitis of both legs 12/22/2017     Priority: Medium     Trichiasis of right lower eyelid 08/03/2017     Priority: Medium     Eyelid laceration, right, initial encounter 03/02/2017     Priority: Medium     Altered mental status, unspecified 03/02/2017     Priority: Medium      Alcohol intoxication, with unspecified complication (H) 03/02/2017     Priority: Medium     Homeless single person 06/20/2016     Priority: Medium     Poisoning by benzodiazepines, undetermined, initial encounter 02/22/2016     Priority: Medium     Generalized anxiety disorder 09/28/2015     Priority: Medium     Diagnosis updated by automated process. Provider to review and confirm.       Alcohol abuse 09/14/2015     Priority: Medium     Lower urinary tract infectious disease 11/27/2014     Priority: Medium     Leukocytosis 11/27/2014     Priority: Medium     Nodule of right lung 11/26/2014     Priority: Medium     Overview:   Repeat CT chest recommended in 6 months, 12 months and 24 months       Kidney stone 11/26/2014     Priority: Medium     Opioid abuse, in remission (H) 03/07/2014     Priority: Medium     History of heroin abuse (H) 03/07/2014     Priority: Medium     Controlled substance agreement signed 08/14/2013     Priority: Medium     Overview:   Was getting alprazolam from an outside provider. Will not prescribe controlled substances for pt.        Seasonal allergies 03/12/2013     Priority: Medium     Insomnia 03/12/2013     Priority: Medium     Moderate major depression (H) 01/17/2011     Priority: Medium     CARDIOVASCULAR SCREENING; LDL GOAL LESS THAN 160 10/31/2010     Priority: Medium       Current Medications:       buprenorphine HCl-naloxone HCl (SUBOXONE) 4-1 MG per film, Place 1 Film under the tongue 3 times daily       escitalopram (LEXAPRO) 20 MG tablet, Take 1 tablet (20 mg) by mouth daily In addition to 10 mg tab for a total of 30 mg daily.       gabapentin (NEURONTIN) 300 MG capsule, Take 1 capsule (300 mg) by mouth 4 times daily       ibuprofen (ADVIL/MOTRIN) 800 MG tablet, Take 1 tablet (800 mg) by mouth every 8 hours as needed for moderate pain       Lidocaine (LIDOCARE) 4 % Patch, Place 1 patch onto the skin every 24 hours To prevent lidocaine toxicity, patient should be patch free  for 12 hrs daily.       naloxone (NARCAN) 4 MG/0.1ML nasal spray, Spray 1 spray (4 mg) into one nostril alternating nostrils as needed for opioid reversal every 2-3 minutes until assistance arrives       nicotine (NICORETTE) 4 MG lozenge, Place 1 lozenge (4 mg) inside cheek as needed for smoking cessation      No current facility-administered medications on file prior to visit.       Allergies   Allergen Reactions     Diphenhydramine Other (See Comments)     Restless Legs/Feet  Other reaction(s): Restless Legs/Feet  Restless legs  Other reaction(s): Restless Legs/Feet       Methadone Other (See Comments)     Had terrible itching and redness and was pulled off methadone     Mirtazapine Other (See Comments)     Restless Legs/Feet  Other reaction(s): Restless Legs/Feet  Restless legs.  Other reaction(s): Restless Legs/Feet       Seasonal Allergies      Trazodone Other (See Comments) and Unknown     Behavioral Disturbances  Other reaction(s): Behavioral Disturbances  Behavorial disturbance.  - restless legs         REVIEW OF SYSTEMS:  General:  No acute withdrawal symptoms.  No recent infections or fever  Eyes:  No vision concerns.  No double vision.    Resp: No coughing, wheezing or shortness of breath  CV: No chest pains or palpitations  GI: No nausea, vomiting, abdominal pain, diarrhea.  No constipation  : No urinary frequency or dysuria    Musculoskeletal: No significant muscle or joint pains other than as above.  No edema  Neurologic: No numbness, tingling, weakness, problems with balance or coordination  Psychiatric: No acute concerns other than as above.   Skin: No rashes or areas of acute infection      OBJECTIVE                                                    LABS:  Labs reviewed.  Urine tox results POS: buprenorphine today.     Results for orders placed or performed in visit on 10/16/20   Urine Drugs of Abuse Screen Panel 13     Status: Abnormal   Result Value Ref Range    Cannabinoids  (94-gxb-7-carboxy-9-THC) Not Detected NDET^Not Detected ng/mL    Phencyclidine (Phencyclidine) Not Detected NDET^Not Detected ng/mL    Cocaine (Benzoylecgonine) Not Detected NDET^Not Detected ng/mL    Methamphetamine (d-Methamphetamine) Not Detected NDET^Not Detected ng/mL    Opiates (Morphine) Not Detected NDET^Not Detected ng/mL    Amphetamine (d-Amphetamine) Not Detected NDET^Not Detected ng/mL    Benzodiazepines (Nordiazepam) Not Detected NDET^Not Detected ng/mL    Tricyclic Antidepressants (Desipramine) Not Detected NDET^Not Detected ng/mL    Methadone (Methadone) Not Detected NDET^Not Detected ng/mL    Barbiturates (Butalbital) Not Detected NDET^Not Detected ng/mL    Oxycodone (Oxycodone) Not Detected NDET^Not Detected ng/mL    Propoxyphene (Norpropoxyphene) Not Detected NDET^Not Detected ng/mL    Buprenorphine (Buprenorphine) Detected, Abnormal Result (A) NDET^Not Detected ng/mL       PHYSICAL EXAM:  /64   Temp 98  F (36.7  C) (Temporal)   Wt 98 kg (216 lb)   BMI 29.29 kg/m      GENERAL APPEARANCE:  alert, comfortable appearing  EYES:Eyes grossly normal to inspection  NEURO:  Gait normal.  No tremor. Coordination intact.     MENTAL STATUS EXAM:  Appearance:  awake, alert, well groomed and no apparent distress  Attitude:  cooperative  Mood:  better and good  Affect:  appropriate and in normal range and mood congruent  Psychomotor Behavior:  no evidence of tardive dyskinesia, dystonia, or tics  Thought Content:  no evidence of suicidal ideation or homicidal ideation, no evidence of psychotic thought, no auditory hallucinations present and no visual hallucinations present  Insight:  good  Judgement:  intact  Oriented to:  time, person, and place      ASSESSMENT/PLAN                                                      (F11.20) Opioid use disorder, severe, dependence (H) with chronic pain  (primary encounter diagnosis)  Comment: hx of pain pill use and a few episodes of heroin use.  8 months sober. UDS  regularly at sober [place].  States pain improved with recent suboxone dose increase.  Doing well at [facility] [place]note 2[facility] with work schedule.       Plan: Continue buprenorphine HCl-naloxone HCl (SUBOXONE) 4-1MG per film; place 1 film under the tongue three times daily.  #90    Suboxone rx sent to [facility] today for ease of  and refill sent to [facility] in [place] to be filled on or around 11/13/2020.  Writer called [facility] in [place] to explain that 11/13/2020 rx should be filed to fill at later date and they verbalized an understanding.      Continue with sober living in [place], sponsorship, work, volunteering while adhering to social distancing and staying engaged in the recovery community.       (F10.20) Alcohol use disorder, severe, in controlled environment (H)  (F19.10) Polysubstance abuse (H)  Comment: hx stimulant, alcohol and pain pill/heroin use.  Hx long-term rx benzodiazepine use without reported abuse or misuse.    Plan: Continue with sober living in [place], sponsorship, work schedule, as well as sponsorship and recovery community engagement.     Continue with complete abstinence including refraining from prescription or illicit benzodiazepine use.      (F32.1) Moderate Major depression (H)  Comment:  Previously followed at [facility].  Patient denies depression and anxiety symptoms now that he is working, sober, and has new peers/support symptom.  Patient self-tapered lexapro to 20 mg daily and is aware of risks associated with taper off medication including risk of anxiety and depressive sx returning.  He is considering remaining at 20 mg daily dose while going through custody hearings.   Plan:  Recommend patient continue with lexapro 20 mg daily through upcoming stress of custody hearings.  Patient to consider continuing this if he still wants to taper off given risks then he was instructed to take 15 mg daily for a week then 10 mg daily for a week then stop.  He has left over 10 mg  tablets to use for above taper schedule.      (Z79.869) High risk medication use  Comment: suboxone  Plan: UDS per sober house and in clinic as needed, monitor for cravings and return to use as well as pain control          ENCOUNTER FOR LONG TERM USE OF HIGH RISK MEDICATION   High Risk Drug Monitoring?  YES   Drug being monitored: Buprenorphine   Reason for drug: Opioid use disorder    What is being monitored?: Dosage, Cravings, Trigger, side effects, and continued abstinence.      Follow-up: 2 months or sooner if needed.      Patient counseling completed today:  Counseled the patient on the importance of having a recovery program in addition to Suboxone maintenance.  Also discussed having a sober support network, not isolating, being open, honest, and willing to change, avoiding triggers and managing cravings.  In addition, abstinence from alcohol, benzodiazepines, THC, opioids and other drugs of abuse was recommended.  Risk of overdose following a period of abstinence due to decrease tolerance was discussed including risk of death.  Risk of overdose if using Suboxone with other substances particuarly benzodiazepines, alcohol, gabapentin, or other CNS depressants was reviewed.      Linda Benoit CNP  [facility] - Addiction Medicine  [facility]  [phone]

## 2020-10-29 ENCOUNTER — VIRTUAL VISIT (OUTPATIENT)
Dept: FAMILY MEDICINE | Facility: CLINIC | Age: 40
End: 2020-10-29
Payer: COMMERCIAL

## 2020-10-29 DIAGNOSIS — M47.816 PRIMARY OSTEOARTHRITIS OF LUMBAR SPINE: Primary | ICD-10-CM

## 2020-10-29 PROCEDURE — 99213 OFFICE O/P EST LOW 20 MIN: CPT | Mod: 95 | Performed by: FAMILY MEDICINE

## 2020-10-29 RX ORDER — GABAPENTIN 300 MG/1
CAPSULE ORAL
Qty: 540 CAPSULE | Refills: 3 | Status: SHIPPED | OUTPATIENT
Start: 2020-10-29 | End: 2021-07-27

## 2020-10-29 RX ORDER — BUPRENORPHINE AND NALOXONE 4; 1 MG/1; MG/1
1 FILM, SOLUBLE BUCCAL; SUBLINGUAL 3 TIMES DAILY
Qty: 90 FILM | Refills: 0 | Status: CANCELLED | OUTPATIENT
Start: 2020-11-13

## 2020-10-29 NOTE — PROGRESS NOTES
"Edgar Williamson is a 40 year old male who is being evaluated via a billable telephone visit.      The patient has been notified of following:     \"This telephone visit will be conducted via a call between you and your physician/provider. We have found that certain health care needs can be provided without the need for a physical exam.  This service lets us provide the care you need with a short phone conversation.  If a prescription is necessary we can send it directly to your pharmacy.  If lab work is needed we can place an order for that and you can then stop by our lab to have the test done at a later time.    Telephone visits are billed at different rates depending on your insurance coverage. During this emergency period, for some insurers they may be billed the same as an in-person visit.  Please reach out to your insurance provider with any questions.    If during the course of the call the physician/provider feels a telephone visit is not appropriate, you will not be charged for this service.\"    Patient has given verbal consent for Telephone visit?  Yes    What phone number would you like to be contacted at? [phone]    How would you like to obtain your AVS? Mail a copy    Subjective     Edgar Williamson is a 40 year old male who presents via phone visit today for the following health issues:    HPI   Patient with history of chronic low back pain, osteoarthritis in the lumbar region.  He is on gabapentin.  He is taking 300 mg 4 times daily.  He reports medicine has been working well for him however he feels he could increase his medication.  He reports some days he has more stiffness, more back pain.  Difficult for him to sleep at night.    Remains to be active, working.  Denies any alcohol abuse or drugs abuse.    Medication Followup of Gabapentin    Taking Medication as prescribed: yes    Side Effects:  None    Medication Helping Symptoms:  yes   Patient wants to increase the dosage of " Gabapentin    Review of Systems   Constitutional, HEENT, cardiovascular, pulmonary, gi and gu systems are negative, except as otherwise noted.       Objective          Vitals:  No vitals were obtained today due to virtual visit.    healthy, alert and no distress  PSYCH: Alert and oriented times 3; coherent speech, normal   rate and volume, able to articulate logical thoughts, able   to abstract reason, no tangential thoughts, no hallucinations   or delusions  His affect is normal and pleasant  RESP: No cough, no audible wheezing, able to talk in full sentences  Remainder of exam unable to be completed due to telephone visits            Assessment/Plan:    Assessment & Plan     Primary osteoarthritis of lumbar spine  Increased to 2 tab tid.  - gabapentin (NEURONTIN) 300 MG capsule; 2 tab tid ( 600 mg ) increased dose.     There are no Patient Instructions on file for this visit.    No follow-ups on file.    Charlee Hayes MD  [facility]    Phone call duration:  5 minutes

## 2020-12-07 ENCOUNTER — TELEPHONE (OUTPATIENT)
Dept: ADDICTION MEDICINE | Facility: CLINIC | Age: 40
End: 2020-12-07

## 2020-12-07 DIAGNOSIS — F11.20 OPIOID USE DISORDER, SEVERE, DEPENDENCE (H): ICD-10-CM

## 2020-12-07 RX ORDER — BUPRENORPHINE AND NALOXONE 4; 1 MG/1; MG/1
1 FILM, SOLUBLE BUCCAL; SUBLINGUAL 3 TIMES DAILY
Qty: 12 FILM | Refills: 0 | Status: CANCELLED | OUTPATIENT
Start: 2020-12-07

## 2020-12-07 NOTE — TELEPHONE ENCOUNTER
"Called and spoke with patient.  Stated \"I feel like I should have a day or two of medication left\"-- he checked at work as he usually will leave a couple there, has a lock box at home, states he is \"very careful with the medication\".   Denied taking more than 3 per day.     Cathlene Burger, RN    Nurse Liaison  [facility]    Addiction Medicine Services    "

## 2020-12-07 NOTE — TELEPHONE ENCOUNTER
Medication pended for 4 day supply.  Routing to provider.      Bridge request for: buprenorphine HCl-naloxone HCl (SUBOXONE) 4-1mg    Last Appointment: 10/16/20  Last visit note reviewed? yes    Follow up in: 8 weeks with Linda Benoit DNP, PMHNP    Next Appointment: 12/11/20 virtual    No Shows/Cancellations since last appointment: none    Last Refill in Epic (date and amount/how many days):       Most Recent UDS results: POS: buprenorphine on 10/16     reviewed and summarized below:         Cathleen Burger RN    Nurse Liaison  [facility]    Addiction Medicine Services

## 2020-12-07 NOTE — TELEPHONE ENCOUNTER
Writer called [facility]'s pharmacy to verify patient P/U date on suboxone.  Pharmacy states they completed a partial fill on 11/9/2020 due to not having enough in stock to fill entire rx.  Received the rest of the 90 film prescription on 11/10/2020.  He should have enough medication to last until 12/13/2020 if taking as prescribed.     No bridge rx sent at this time as patient has appointment on 12/11/2020 with writer.  Will not fill suboxone early.

## 2020-12-07 NOTE — TELEPHONE ENCOUNTER
Routing to nursing to please call patient and screen how often he is taking suboxone and why he is taking more than prescribed.

## 2020-12-07 NOTE — TELEPHONE ENCOUNTER
Reason for Call:  Medication Request due to: out of medication early    Name of the pharmacy and phone number for the current request:  [id number] - [place], [place] - [address] AT [address]  [phone]    Request for bridge of: buprenorphine HCl-naloxone HCl (SUBOXONE) 4-1 MG per film    Other Information:   Taking as prescribed: Yes  Date/Time/ amount of last dose: 12/07 1/3dose    Pt informed to follow up with pharmacy for status of refill as addiction RN will only reach out if there are any issues or questions and will be addressed within one business day.  Pt also informed that this request for a bridge is simply a request and doesn't guarantee the medication will be filled.    Can we leave a detailed message on this number? Yes    Phone number patient can be reached at: [phone]    Best Time: Anytime

## 2020-12-08 NOTE — TELEPHONE ENCOUNTER
"Patient had called wanting to know why he did not receive a bridge - writer relayed providers message on how a bridge will not be given and he should have enough until his appt. Patient got upset wanting to speak with nursing because \"things just arent adding up\"  "

## 2020-12-09 RX ORDER — BUPRENORPHINE AND NALOXONE 4; 1 MG/1; MG/1
1 FILM, SOLUBLE BUCCAL; SUBLINGUAL 3 TIMES DAILY
Qty: 6 FILM | Refills: 0 | Status: SHIPPED | OUTPATIENT
Start: 2020-12-09 | End: 2020-12-11

## 2020-12-09 NOTE — TELEPHONE ENCOUNTER
Discussed need for bridge with patient.  Last visit on 10/16/2020 and suboxone 2-0.5 mg TID #90 sent with 1 refill.  Refill was set to be filled around 11/13/2020 but should not have been needed until 11/15/2020.      I understand that patient filled suboxone refill early (11/9/2020) but should still have had enough to last through scheduled 12/11/2020 appointment.  Discussed with patient importance of keeping track of his medications.  Hx of suspected diversion.     Bridge rx sent today and patient aware that in the future he will need to manage his medication carefully to prevent missing films.  He verbalizes he has not taken extra doses.     No further action needed at this time.

## 2020-12-09 NOTE — TELEPHONE ENCOUNTER
Per [place] , patient received 15 Suboxone films (5 days worth) on 11/9/20 and then received 75 films on 11/10/20. 75 films, 3 films daily, is enough for 25 days. Patient should be out of Suboxone by today, which he is. Requesting Suboxone bridge rx through to appt w/Linda Benoit 12/11/20.    Routing refill request to Linda.    [place]         Gisel Quiñones RN on 12/9/2020 at 1:47 PM

## 2020-12-11 ENCOUNTER — VIRTUAL VISIT (OUTPATIENT)
Dept: ADDICTION MEDICINE | Facility: CLINIC | Age: 40
End: 2020-12-11
Payer: COMMERCIAL

## 2020-12-11 DIAGNOSIS — F10.20 ALCOHOL USE DISORDER, SEVERE, IN CONTROLLED ENVIRONMENT (H): ICD-10-CM

## 2020-12-11 DIAGNOSIS — F11.20 OPIOID USE DISORDER, SEVERE, DEPENDENCE (H): Primary | ICD-10-CM

## 2020-12-11 DIAGNOSIS — F19.10 POLYSUBSTANCE ABUSE (H): ICD-10-CM

## 2020-12-11 DIAGNOSIS — F32.1 MODERATE MAJOR DEPRESSION (H): ICD-10-CM

## 2020-12-11 DIAGNOSIS — F17.200 TOBACCO DEPENDENCE: ICD-10-CM

## 2020-12-11 DIAGNOSIS — Z79.899 HIGH RISK MEDICATION USE: ICD-10-CM

## 2020-12-11 PROCEDURE — 99214 OFFICE O/P EST MOD 30 MIN: CPT | Mod: 95 | Performed by: NURSE PRACTITIONER

## 2020-12-11 RX ORDER — ESCITALOPRAM OXALATE 20 MG/1
20 TABLET ORAL DAILY
Qty: 30 TABLET | Refills: 3 | Status: SHIPPED | OUTPATIENT
Start: 2020-12-11 | End: 2021-04-16

## 2020-12-11 RX ORDER — BUPRENORPHINE AND NALOXONE 4; 1 MG/1; MG/1
1 FILM, SOLUBLE BUCCAL; SUBLINGUAL 3 TIMES DAILY
Qty: 90 FILM | Refills: 1 | Status: SHIPPED | OUTPATIENT
Start: 2020-12-11 | End: 2021-02-08

## 2020-12-11 NOTE — PROGRESS NOTES
"[facility] ADDICTION MEDICINE  VIDEO Progress Note                                                      SUBJECTIVE                                                    Edgar Williamson is a 40 year old male who is being evaluated via a billable VIDEO visit due to COVID-19 pandemic in order to reduce potential unnecessary exposure to the virus.       Telemedicine Visit: The patient's condition can be safely assessed and treated via synchronous audio and visual telemedicine encounter.      The patient has been notified of following:     \"This video visit will be conducted via a call between you and your physician/provider. We have found that certain health care needs can be provided without the need for an in-person physical exam.  This service lets us provide the care you need with a video conversation.  If a prescription is necessary we can send it directly to your pharmacy.  If lab work is needed we can place an order for that and you can then stop by our lab to have the test done at a later time.    Video visits are billed at different rates depending on your insurance coverage.  Please reach out to your insurance provider with any questions.    If during the course of the call the physician/provider feels a video visit is not appropriate, you will not be charged for this service.\"    Patient has given verbal consent for Video visit? Yes  How would you like to obtain your AVS? MyChart  If you are dropped from the video visit, the video invite should be resent to: Text to cell phone: [phone]   Will anyone else be joining your video visit? No    Video-Visit Details    Type of service:  Video Visit    Video Start Time: 10:38 AM  Video End Time: 10:55 AM    Originating Location (pt. Location): Home    Distant Location (provider location):  [facility]     Platform used for Video Visit: [facility]    Edgar Williamson complains of    Chief Complaint   Patient presents with     RECHECK "     Date of last visit:  110/16/2020  BUPRENORPHINE FOLLOW UP: current dose suboxone 4-1 mg TID.     Primary Care Provider: Charlee Hayes MD   [place] Board of Pharmacy Data Base Reviewed:    Yes; reviewed and no concerns for diversionary activity.  Most recent data includes:  11/12/2020  5   11/12/2020  Acetaminophen-Cod #3 Tablet  20.00  3 Fr Robby   [id number]   Wal ([id number])   0  30.00 MME  [place]   11/10/2020  5   10/16/2020  Suboxone 4 Mg-1 MG SL Film  75.00  30 Kr Abhijit   [id number]   Wal ([id number])   0  10.00 mg  [place]   11/09/2020  5   10/16/2020  Suboxone 4 Mg-1 MG SL Film  15.00  5 Kr Abhijit            Brief History:    Initial visit with me on 12/13/2019 when UDS was positive for amphetamines and methamphetamine.  History of opioid and alcohol use disorders, major depression, generalized anxiety, initial insomnia, and chronic pain secondary to osteoarthritis of lumbar spine.  No alcohol use since 2015.  Completed treatment at [facility] in 11/2015 after detox stay at [facility].  No hx seizures or DTs.  Abusing opioid pain pills since 2011.  Once he stopped drinking in 2015 he started using more opioid pain pills and self-medicating back pain using up to 60 mg oxycontin daily (smoking) and tried heroin a few times.  No hx IVDU.  History of hallucinogen and cocaine use in 2009 and earlier.  Longest period of sobriety 4 years.  Hx of suboxone maintenance through [facility] for 4 months and was taking 16-4mg daily.      Psychiatric provider - Dr Cody at [facility] who manages his Lexapro 30 mg daily, propranolol ER 80 mg BID.      Hepatitis C Status -  02/19/2020 reactive and HCV RNA quant 12,443 international unit(s)/mL.  Completed treatment.      Date of last use:   2/8/2020 - heroin, alcohol, methamphetamine    HPI:    12/11/2020  Patient status since last visit:     Cravings: denies     MAT Dose: adequate    Side Effects: none    Cues to use and relapse triggers: None at this time     Recent rx for  tylenol #3 for dental pain s/p tooth extraction mid-november per patient.  No substance use and took meds as prescribed.  At last visit we continued his lexapro 20 mg daily and he would like to continue at this dose at this time to prevent lapse in anxiety and depressive symptoms.  He found a house mate  this morning from suspected drug overdose.  He plans to attend several meetings this weekend for additional support and house owner and manager present on the scene now to process the event with tenants.      He was promoted to assistant  a month ago.  Family court was delayed until 2020 which is around his 1 year sobriety anniversary.  Despite traumatizing event this morning, things are going well.  He has narcan supply as does the sober house in case needed.  Anxiety and depressive symptoms stable.  Chronic back pain well managed with current suboxone dose.     Tobacco use - vaping daily and smoking 1 pack of cigarettes a month; using nicotine lozenges daily as needed. Refill sent today.     I have reviewed and updated the patient's Past Medical History, Social History, Family History and Medication List.    Patient Active Problem List    Diagnosis Date Noted     Hepatitis C, chronic (H) 2020     Priority: Medium     Primary osteoarthritis of lumbar spine 2019     Priority: Medium     Strain of lumbar region, initial encounter 2019     Priority: Medium     Benign meningioma of brain (H)      Priority: Medium     Stasis dermatitis of both legs 2017     Priority: Medium     Trichiasis of right lower eyelid 2017     Priority: Medium     Eyelid laceration, right, initial encounter 2017     Priority: Medium     Altered mental status, unspecified 2017     Priority: Medium     Alcohol intoxication, with unspecified complication (H) 2017     Priority: Medium     Homeless single person 2016     Priority: Medium     Poisoning by benzodiazepines,  undetermined, initial encounter 02/22/2016     Priority: Medium     Generalized anxiety disorder 09/28/2015     Priority: Medium     Diagnosis updated by automated process. Provider to review and confirm.       Alcohol abuse 09/14/2015     Priority: Medium     Lower urinary tract infectious disease 11/27/2014     Priority: Medium     Leukocytosis 11/27/2014     Priority: Medium     Nodule of right lung 11/26/2014     Priority: Medium     Overview:   Repeat CT chest recommended in 6 months, 12 months and 24 months       Kidney stone 11/26/2014     Priority: Medium     Opioid abuse, in remission (H) 03/07/2014     Priority: Medium     History of heroin abuse (H) 03/07/2014     Priority: Medium     Controlled substance agreement signed 08/14/2013     Priority: Medium     Overview:   Was getting alprazolam from an outside provider. Will not prescribe controlled substances for pt.        Seasonal allergies 03/12/2013     Priority: Medium     Insomnia 03/12/2013     Priority: Medium     Moderate major depression (H) 01/17/2011     Priority: Medium     CARDIOVASCULAR SCREENING; LDL GOAL LESS THAN 160 10/31/2010     Priority: Medium       Current Medications:       buprenorphine HCl-naloxone HCl (SUBOXONE) 4-1 MG per film, Place 1 Film under the tongue 3 times daily       buprenorphine HCl-naloxone HCl (SUBOXONE) 4-1 MG per film, Place 1 Film under the tongue 3 times daily       escitalopram (LEXAPRO) 20 MG tablet, Take 1 tablet (20 mg) by mouth daily In addition to 10 mg tab for a total of 30 mg daily.       gabapentin (NEURONTIN) 300 MG capsule, 2 tab tid ( 600 mg ) increased dose.       ibuprofen (ADVIL/MOTRIN) 800 MG tablet, Take 1 tablet (800 mg) by mouth every 8 hours as needed for moderate pain       Lidocaine (LIDOCARE) 4 % Patch, Place 1 patch onto the skin every 24 hours To prevent lidocaine toxicity, patient should be patch free for 12 hrs daily.       naloxone (NARCAN) 4 MG/0.1ML nasal spray, Spray 1 spray (4  mg) into one nostril alternating nostrils as needed for opioid reversal every 2-3 minutes until assistance arrives       nicotine (NICORETTE) 4 MG lozenge, Place 1 lozenge (4 mg) inside cheek as needed for smoking cessation    No current facility-administered medications on file prior to visit.         REVIEW OF SYSTEMS:  General:  No acute withdrawal symptoms.  No recent infections or fever  Eyes/ENT:  No vision concerns.  No double vision.    Resp: No coughing, wheezing or shortness of breath  CV: No chest pains or palpitations  GI: No nausea, vomiting, abdominal pain, diarrhea.  No constipation  : No urinary frequency or dysuria    Musculoskeletal: No significant muscle or joint pains other than as above.  No edema  Neurologic: No numbness, tingling, weakness, problems with balance or coordination  Psychiatric: No acute concerns other than as above.   Skin: No rashes or areas of acute infection    OBJECTIVE                                                    LABS:  Labs reviewed. No UDS collected as patient evaluated over VIDEO visit.     PHYSICAL EXAM:  GENERAL: Healthy, alert and no distress  EYES: Eyes grossly normal to inspection.  No discharge or erythema, or obvious scleral/conjunctival abnormalities.  RESP: No audible wheeze, cough, or visible cyanosis.  No visible retractions or increased work of breathing.    SKIN: Visible skin clear. No significant rash, abnormal pigmentation or lesions.  NEURO: Cranial nerves grossly intact.  Mentation and speech appropriate for age.  PSYCH: Mentation appears normal, affect normal/bright, judgement and insight intact, normal speech and appearance well-groomed.      MENTAL STATUS EXAM:  Appearance:  awake, alert, well groomed, cooperative and no apparent distress  Attitude:  cooperative  Mood:  good  Affect:  appropriate and in normal range and mood congruent  Psychomotor Behavior:  no evidence of tardive dyskinesia, dystonia, or tics  Thought Content:  no evidence of  suicidal ideation or homicidal ideation, no evidence of psychotic thought, no auditory hallucinations present and no visual hallucinations present  Insight:  good  Judgement:  intact  Oriented to:  time, person, and place    ASSESSMENT/PLAN                                                      (F11.20) Opioid use disorder, severe, dependence (H) with chronic pain  (primary encounter diagnosis)  Comment: hx of pain pill use and a few episodes of heroin use. UDS regularly at sober house and most recent UDS requested to be faxed or called to [facility].     Plan: Continue buprenorphine HCl-naloxone HCl (SUBOXONE) 4-1MG per film; place 1 film under the tongue three times daily.  #90  with 1 refill     Continue with sober living in [place], sponsorship, work, volunteering while adhering to social distancing and staying engaged in the recovery community.      Patient to request most recent UDS results be called into [place] by  or faxed to [place] for review.  Patient aware that if no UDS results received he will need to schedule lab only visit in clinic.      (F10.20) Alcohol use disorder, severe, in controlled environment (H)  (F19.10) Polysubstance abuse (H)  Comment: hx stimulant, alcohol and pain pill/heroin use.  Hx long-term rx benzodiazepine use without reported abuse or misuse.    Plan: Continue with sober living in [place], sponsorship, work schedule, as well as sponsorship and recovery community engagement.     Continue with complete abstinence including refraining from prescription or illicit benzodiazepine use.      (F32.1) Moderate Major depression (H)  Comment:  Previously followed at [facility].  Patient denies depression and anxiety symptoms now that he is working, sober, and has new peers/support symptom.    Plan:  Continue with lexapro 20 mg daily through upcoming stress of custody hearings.      (F17.200) Tobacco dependence   Refill sent on nicotine lozenges 4 mg inside  cheek as needed for smoking reduction.  Continue to evaluate readiness to quit nicotine use.     (Z79.828) High risk medication use  Comment: suboxone  Plan: UDS per sober house and in clinic as needed, monitor for cravings and return to use as well as pain control     ENCOUNTER FOR LONG TERM USE OF HIGH RISK MEDICATION   High Risk Drug Monitoring?  YES   Drug being monitored: Buprenorphine   Reason for drug: Opioid use disorder   What is being monitored?: Dosage, Cravings, Trigger, side effects, and continued abstinence.      Follow-up: 2 months     Patient counseling completed today:  Counseled the patient on the importance of seeking additional support during this time after tragic death of house mate.  Patient offered therapy referral but declined at this time.  Patient to notify writer if he would like therapy referral for additional support to process grief/trauma of finding house mate .         As the provider I attest to compliance with applicable laws and regulations related to telemedicine and that the above documentation accurately summarizes the assessments, treatment plan, and patient education completed during this visit.         Linda Benoit CNP  [facility] - Addiction Medicine  [facility]  [phone]

## 2020-12-24 ENCOUNTER — TRANSFERRED RECORDS (OUTPATIENT)
Dept: HEALTH INFORMATION MANAGEMENT | Facility: CLINIC | Age: 40
End: 2020-12-24

## 2021-02-05 ENCOUNTER — OFFICE VISIT (OUTPATIENT)
Dept: FAMILY MEDICINE | Facility: CLINIC | Age: 41
End: 2021-02-05
Payer: COMMERCIAL

## 2021-02-05 VITALS
WEIGHT: 214 LBS | HEART RATE: 52 BPM | SYSTOLIC BLOOD PRESSURE: 102 MMHG | BODY MASS INDEX: 29.96 KG/M2 | DIASTOLIC BLOOD PRESSURE: 58 MMHG | OXYGEN SATURATION: 100 % | TEMPERATURE: 98.1 F | HEIGHT: 71 IN

## 2021-02-05 DIAGNOSIS — H10.401 CHRONIC BACTERIAL CONJUNCTIVITIS OF RIGHT EYE: Primary | ICD-10-CM

## 2021-02-05 DIAGNOSIS — F51.01 PRIMARY INSOMNIA: ICD-10-CM

## 2021-02-05 PROCEDURE — 99213 OFFICE O/P EST LOW 20 MIN: CPT | Performed by: FAMILY MEDICINE

## 2021-02-05 RX ORDER — ZOLPIDEM TARTRATE 5 MG/1
5 TABLET ORAL
Qty: 30 TABLET | Refills: 0 | Status: SHIPPED | OUTPATIENT
Start: 2021-02-05 | End: 2021-05-07

## 2021-02-05 RX ORDER — OFLOXACIN 3 MG/ML
1-2 SOLUTION/ DROPS OPHTHALMIC 4 TIMES DAILY
Qty: 5 ML | Refills: 0 | Status: SHIPPED | OUTPATIENT
Start: 2021-02-05 | End: 2021-06-14

## 2021-02-05 ASSESSMENT — PAIN SCALES - GENERAL: PAINLEVEL: NO PAIN (0)

## 2021-02-05 ASSESSMENT — MIFFLIN-ST. JEOR: SCORE: 1896.95

## 2021-02-05 NOTE — PROGRESS NOTES
Assessment & Plan     Chronic bacterial conjunctivitis of right eye  Advised with good hygiene, warm compress or warm water washing.  Advised patient to avoid rubbing his eyes,  - ofloxacin (OCUFLOX) 0.3 % ophthalmic solution; Place 1-2 drops into the right eye 4 times daily    Primary insomnia  Chronic, uses Ambien as needed.  Denies alcohol abuse or drug abuse.  He does follow-up with a pain clinic.  - zolpidem (AMBIEN) 5 MG tablet; Take 1 tablet (5 mg) by mouth nightly as needed for sleep         There are no Patient Instructions on file for this visit.    Return in about 3 months (around 5/5/2021) for Routine preventive.    Charele Hayes MD  [facility]    Akiko Hernández is a 40 year old who presents to clinic today for the following health issues    Patient reports many years ago, at least 2 years he had an injury to his right eyes, right lower eyelid was lacerated where he had stitches.  He reports for the past few days he has been more red, more  discharge especially in the morning.  He denies pain with eye movement, no blurred vision, no recent injury, does not wear contacts.    History of insomnia, in the past he used Ambien as needed basis.  Denies alcohol abuse, drugs abuse, denies panic attack.    Eye(s) Problem  Onset/Duration: two years  Description:   Location: YES- right  Pain: no  Redness: YES  Accompanying Signs & Symptoms:  Discharge/mattering: YES- mostly morning  Swelling: no  Visual changes: no  Fever: no  Nasal Congestion: no  Bothered by bright lights: YES  History:  Trauma: no  Foreign body exposure: no  Wearing contacts: no  Precipitating or alleviating factors: none  Therapies tried and outcome: None    Patient is also complaining of sleep apnea    Review of Systems   Constitutional, HEENT, cardiovascular, pulmonary, GI, , musculoskeletal, neuro, skin, endocrine and psych systems are negative, except as otherwise noted.      Objective    There were  no vitals taken for this visit.  There is no height or weight on file to calculate BMI.  Physical Exam   GENERAL: healthy, alert and no distress  EYES: Eyes grossly normal to inspection, PERRL, EOMI.   conjunctiva/corneas- conjunctival injection right eye  PSYCH: mentation appears normal, affect normal/bright      Charlee Hayes MD

## 2021-02-08 ENCOUNTER — VIRTUAL VISIT (OUTPATIENT)
Dept: ADDICTION MEDICINE | Facility: CLINIC | Age: 41
End: 2021-02-08
Payer: COMMERCIAL

## 2021-02-08 ENCOUNTER — TELEPHONE (OUTPATIENT)
Dept: ADDICTION MEDICINE | Facility: CLINIC | Age: 41
End: 2021-02-08

## 2021-02-08 DIAGNOSIS — F19.10 POLYSUBSTANCE ABUSE (H): ICD-10-CM

## 2021-02-08 DIAGNOSIS — F10.21 ALCOHOL USE DISORDER, SEVERE, IN SUSTAINED REMISSION (H): ICD-10-CM

## 2021-02-08 DIAGNOSIS — F11.20 OPIOID USE DISORDER, SEVERE, DEPENDENCE (H): Primary | ICD-10-CM

## 2021-02-08 DIAGNOSIS — Z79.899 HIGH RISK MEDICATION USE: ICD-10-CM

## 2021-02-08 DIAGNOSIS — F32.1 MODERATE MAJOR DEPRESSION (H): ICD-10-CM

## 2021-02-08 PROBLEM — Z86.19 HISTORY OF HEPATITIS C VIRUS INFECTION: Status: ACTIVE | Noted: 2020-08-28

## 2021-02-08 PROBLEM — F10.929 ALCOHOL INTOXICATION, WITH UNSPECIFIED COMPLICATION (H): Status: RESOLVED | Noted: 2017-03-02 | Resolved: 2021-02-08

## 2021-02-08 PROBLEM — R41.82 ALTERED MENTAL STATUS, UNSPECIFIED: Status: RESOLVED | Noted: 2017-03-02 | Resolved: 2021-02-08

## 2021-02-08 PROCEDURE — 99214 OFFICE O/P EST MOD 30 MIN: CPT | Mod: 95 | Performed by: NURSE PRACTITIONER

## 2021-02-08 RX ORDER — BUPRENORPHINE AND NALOXONE 4; 1 MG/1; MG/1
1 FILM, SOLUBLE BUCCAL; SUBLINGUAL 3 TIMES DAILY
Qty: 90 FILM | Refills: 1 | Status: SHIPPED | OUTPATIENT
Start: 2021-02-08 | End: 2021-04-05

## 2021-02-08 NOTE — TELEPHONE ENCOUNTER
Prior Authorization Retail Medication Request    Medication/Dose: buprenorphine HCl-naloxone HCl (SUBOXONE) 4-1 MG per film  ICD code (if different than what is on RX):    Previously Tried and Failed:    Rationale:      Insurance Name:    Insurance ID:  [id number]  [phone]    Pharmacy Information (if different than what is on RX)  Name:  [facility]  Phone:  [phone]

## 2021-02-08 NOTE — PROGRESS NOTES
[facility] ADDICTION MEDICINE  VIDEO Progress Note                                                      SUBJECTIVE                                                    Today's VIDEO visit due to COVID-19 pandemic in order to reduce potential unnecessary exposure to the virus.     Telemedicine Visit: The patient's condition can be safely assessed and treated via synchronous audio and visual telemedicine encounter.      Edgar is a 40 year old who is being evaluated via a billable video visit.      How would you like to obtain your AVS? MyChart  If the video visit is dropped, the invitation should be resent by: Text to cell phone: [phone]  Will anyone else be joining your video visit? No      Video Start Time: 4:07 PM    Video-Visit Details    Type of service:  Video Visit    Video End Time:4:27 PM    Originating Location (pt. Location): Home    Distant Location (provider location):  [facility]     Platform used for Video Visit: Jasmine Hernández ROMULO Williamson complains of    Chief Complaint   Patient presents with     Video Visit     Addiction Problem     Date of last visit:  12/11/2020  BUPRENORPHINE FOLLOW UP: current dose Suboxone 4-1 mg SL three times daily.     Primary Care Provider: Charlee Hayes MD   [place] Board of Pharmacy Data Base Reviewed:    Yes; reviewed and no concerns for diversionary activity.  Most recent data includes:  02/05/2021  5   02/05/2021  Zolpidem Tartrate 5 MG Tablet  30.00  30 Mo Ton   [id number]   [facility]son ([id number])   0  0.25 LME  [place]icaid [place]   01/07/2021  5   12/11/2020  Suboxone 4 Mg-1 MG SL Film  90.00  30 Kr Abhijit   [id number]   Wal ([id number])   1  12.00 mg  [place]   01/04/2021  5   10/29/2020  Gabapentin 300 MG Capsule  540.00  90 Mo Ton            Brief History:    Initial visit with me on 12/13/2019 when UDS was positive for amphetamines and methamphetamine.  History of opioid and alcohol use disorders, major depression, generalized anxiety, initial  insomnia, and chronic pain secondary to osteoarthritis of lumbar spine.  No alcohol use since 2015.  Completed treatment at [facility] in 11/2015 after detox stay at [facility].  No hx seizures or DTs.  Abusing opioid pain pills since 2011.  Once he stopped drinking in 2015 he started using more opioid pain pills and self-medicating back pain using up to 60 mg oxycontin daily (smoking) and tried heroin a few times.  No hx IVDU.  History of hallucinogen and cocaine use in 2009 and earlier.  Longest period of sobriety 4 years.  Hx of suboxone maintenance through [facility] for 4 months and was taking 16-4mg daily.      Hepatitis C Status -  02/19/2020 reactive and HCV RNA quant 12,443 international unit(s)/mL.  Completed treatment.      Date of last use:   2/8/2020 - heroin, alcohol, methamphetamine    Last UDS on file:  10/16/2020 - negative for all substances except buprenorphine as expected     HPI:    2/8/2021  Patient status since last visit: stable     Cravings: denies     MAT Dose: adequate    Side Effects: none     Cues to use and relapse triggers: None at this time     Since last visit he reports moving into a loft at an AA friend's house.  We did not receive results from his sober house after last visit and discussed need for lab only UDS.  He reports doing well and will have a year sober tomorrow.  He still has stress related to upcoming April 2nd hearing about child custody but his mood has remained stable since reduction in lexapro to 20 mg once daily.  His sleep around this time of year has been difficult chronically per patient, due to lack of activity.  His PCP started ambien 5 mg nightly as he reports RLS from other sleep medications including mirtazapine and trazodone.  He reports it has been minimally effective.  Discussed non-pharm options for sleep including progressive muscle relaxation, magnesium, and sleep hygiene.  No other concerns at this time.        I have reviewed and updated the  patient's Past Medical History, Social History, Family History and Medication List.    Patient Active Problem List    Diagnosis Date Noted     Opioid use disorder, severe, dependence (H) with chronic pain 12/11/2020     Priority: Medium     Tobacco dependence 12/11/2020     Priority: Medium     Hepatitis C, chronic (H) 08/28/2020     Priority: Medium     Primary osteoarthritis of lumbar spine 04/23/2019     Priority: Medium     Strain of lumbar region, initial encounter 04/23/2019     Priority: Medium     Benign meningioma of brain (H)      Priority: Medium     Stasis dermatitis of both legs 12/22/2017     Priority: Medium     Trichiasis of right lower eyelid 08/03/2017     Priority: Medium     Eyelid laceration, right, initial encounter 03/02/2017     Priority: Medium     Altered mental status, unspecified 03/02/2017     Priority: Medium     Alcohol intoxication, with unspecified complication (H) 03/02/2017     Priority: Medium     Homeless single person 06/20/2016     Priority: Medium     Poisoning by benzodiazepines, undetermined, initial encounter 02/22/2016     Priority: Medium     Generalized anxiety disorder 09/28/2015     Priority: Medium     Diagnosis updated by automated process. Provider to review and confirm.       Alcohol abuse 09/14/2015     Priority: Medium     Lower urinary tract infectious disease 11/27/2014     Priority: Medium     Leukocytosis 11/27/2014     Priority: Medium     Nodule of right lung 11/26/2014     Priority: Medium     Overview:   Repeat CT chest recommended in 6 months, 12 months and 24 months       Kidney stone 11/26/2014     Priority: Medium     Opioid abuse, in remission (H) 03/07/2014     Priority: Medium     History of heroin abuse (H) 03/07/2014     Priority: Medium     Controlled substance agreement signed 08/14/2013     Priority: Medium     Overview:   Was getting alprazolam from an outside provider. Will not prescribe controlled substances for pt.        Seasonal  allergies 03/12/2013     Priority: Medium     Insomnia 03/12/2013     Priority: Medium     Moderate major depression (H) 01/17/2011     Priority: Medium     CARDIOVASCULAR SCREENING; LDL GOAL LESS THAN 160 10/31/2010     Priority: Medium       Current Medications:       buprenorphine HCl-naloxone HCl (SUBOXONE) 4-1 MG per film, Place 1 Film under the tongue 3 times daily       escitalopram (LEXAPRO) 20 MG tablet, Take 1 tablet (20 mg) by mouth daily       gabapentin (NEURONTIN) 300 MG capsule, 2 tab tid ( 600 mg ) increased dose.       ibuprofen (ADVIL/MOTRIN) 800 MG tablet, Take 1 tablet (800 mg) by mouth every 8 hours as needed for moderate pain       Lidocaine (LIDOCARE) 4 % Patch, Place 1 patch onto the skin every 24 hours To prevent lidocaine toxicity, patient should be patch free for 12 hrs daily.       naloxone (NARCAN) 4 MG/0.1ML nasal spray, Spray 1 spray (4 mg) into one nostril alternating nostrils as needed for opioid reversal every 2-3 minutes until assistance arrives       nicotine (NICORETTE) 4 MG lozenge, Place 1 lozenge (4 mg) inside cheek as needed for smoking cessation       ofloxacin (OCUFLOX) 0.3 % ophthalmic solution, Place 1-2 drops into the right eye 4 times daily       zolpidem (AMBIEN) 5 MG tablet, Take 1 tablet (5 mg) by mouth nightly as needed for sleep    No current facility-administered medications on file prior to visit.     REVIEW OF SYSTEMS:  General:  No acute withdrawal symptoms.  No recent infections or fever  Eyes/ENT:  No vision concerns.  No double vision.    Resp: No coughing, wheezing or shortness of breath  CV: No chest pains or palpitations  GI: No nausea, vomiting, abdominal pain, diarrhea.  No constipation  : No urinary frequency or dysuria    Musculoskeletal: No significant muscle or joint pains other than as above.  No edema  Neurologic: No numbness, tingling, weakness, problems with balance or coordination  Psychiatric: No acute concerns other than as above.   Skin:  No rashes or areas of acute infection    OBJECTIVE                                                    LABS:  Labs reviewed. No UDS collected as patient evaluated over VIDEO visit.     PHYSICAL EXAM:  GENERAL: Healthy, alert and no distress  EYES: Eyes grossly normal to inspection.  No discharge or erythema, or obvious scleral/conjunctival abnormalities.  RESP: No audible wheeze, cough, or visible cyanosis.  No visible retractions or increased work of breathing.    SKIN: Visible skin clear. No significant rash, abnormal pigmentation or lesions.  NEURO: Cranial nerves grossly intact.  Mentation and speech appropriate for age.  PSYCH: Mentation appears normal, affect normal/bright, judgement and insight intact, normal speech and appearance well-groomed.      MENTAL STATUS EXAM:  Appearance:  awake, alert and well groomed  Attitude:  cooperative  Mood:  good  Affect:  appropriate and in normal range and mood congruent  Psychomotor Behavior:  no evidence of tardive dyskinesia, dystonia, or tics  Thought Content:  no evidence of suicidal ideation or homicidal ideation, no evidence of psychotic thought, no auditory hallucinations present and no visual hallucinations present  Insight:  good  Judgement:  fair  Oriented to:  time, person, and place    ASSESSMENT/PLAN                                                      (F11.20) Opioid use disorder, severe, dependence (H) with chronic pain  (primary encounter diagnosis)  Plan: Continue buprenorphine HCl-naloxone HCl (SUBOXONE) 4-1MG per film; place 1 film under the tongue three times daily.  #90  with 1 refill     Continue with sponsorship and staying engaged in the recovery community.    UDS results not received after last visit.  Patient to present for lab only UDS appt this Friday to monitor sobriety and medication adherence.  If he does not complete this visit he is aware that his suboxone refill rx will be cancelled until this is completed.      (F10.20) Alcohol use  disorder, severe, in controlled environment (H)  (F19.10) Polysubstance abuse (H)  Comment: hx stimulant, alcohol and pain pill/heroin use.  Hx long-term rx benzodiazepine use with diversionary behaviors.    Plan: Continue sponsorship and complete abstinence as above, including refraining from prescription or illicit benzodiazepine use.      (F32.1) Moderate Major depression (H)  Comment:  Previously followed at [facility].  Patient denies depression and anxiety symptoms now that he is working, sober, and has new peers/support symptom.    Plan:  Continue with lexapro 20 mg daily through upcoming stress of custody hearings in April, then evaluate ability to continue to taper off lexapro while monitoring for return of depression or anxiety sx.        (Z79.899) High risk medication use  Plan: UDS as above, monitor for cravings and return to use as well as pain control     ENCOUNTER FOR LONG TERM USE OF HIGH RISK MEDICATION   High Risk Drug Monitoring?  YES   Drug being monitored: Buprenorphine   Reason for drug: Opioid use disorder    What is being monitored?: Dosage, Cravings, Trigger, side effects, and continued abstinence.      Follow-up: 2-3 months.     Patient counseling completed today:  Counseled the patient on non-pharmacologic options for sleep including sleep hygiene, magnesium, progressive muscle relaxation, and recommended limited use of ambien.         As the provider I attest to compliance with applicable laws and regulations related to telemedicine and that the above documentation accurately summarizes the assessments, treatment plan, and patient education completed during this visit.         Linda Benoit, MORGAN  Psychiatric Mental Health Nurse Practitioner   [facility] - Addiction Medicine  [facility]   [phone]

## 2021-02-10 NOTE — TELEPHONE ENCOUNTER
No pa needed- Brand suboxone is covered without needing a pa. The pharmacy already received a paid claim for brand.

## 2021-02-12 ENCOUNTER — TELEPHONE (OUTPATIENT)
Dept: ADDICTION MEDICINE | Facility: CLINIC | Age: 41
End: 2021-02-12

## 2021-02-12 NOTE — TELEPHONE ENCOUNTER
Jayla    Per pt lab results report was faxed to  on Wednesday, from [facility],  I was able to find the fax    Sobia MARIE is abstracting the report/results to pt chart    Rossana Lugo RN   [facility]

## 2021-02-24 ENCOUNTER — VIRTUAL VISIT (OUTPATIENT)
Dept: ADDICTION MEDICINE | Facility: CLINIC | Age: 41
End: 2021-02-24
Payer: COMMERCIAL

## 2021-02-24 DIAGNOSIS — Z79.899 HIGH RISK MEDICATION USE: ICD-10-CM

## 2021-02-24 DIAGNOSIS — F10.21 ALCOHOL USE DISORDER, SEVERE, IN SUSTAINED REMISSION (H): ICD-10-CM

## 2021-02-24 DIAGNOSIS — F11.20 OPIOID USE DISORDER, SEVERE, DEPENDENCE (H): Primary | ICD-10-CM

## 2021-02-24 DIAGNOSIS — F32.1 MODERATE MAJOR DEPRESSION (H): ICD-10-CM

## 2021-02-24 DIAGNOSIS — F19.10 POLYSUBSTANCE ABUSE (H): ICD-10-CM

## 2021-02-24 PROCEDURE — 99214 OFFICE O/P EST MOD 30 MIN: CPT | Mod: 95 | Performed by: NURSE PRACTITIONER

## 2021-02-24 NOTE — PROGRESS NOTES
[facility] ADDICTION MEDICINE  TELEPHONE Progress Note                                                      SUBJECTIVE                                                    Today's TELEPHONE visit due to COVID-19 pandemic in order to reduce potential unnecessary exposure to the virus.       Edgar is a 40 year old who is being evaluated via a billable telephone visit.      What phone number would you like to be contacted at? [phone]   How would you like to obtain your AVS? Burt    Phone call duration: 16 minutes     Edgar Williamson complains of   Chief Complaint   Patient presents with     Telephone     Addiction Problem     Date of last visit:  2/12/2021  BUPRENORPHINE FOLLOW UP: current dose Suboxone 4-1 mg SL three times daily.     Primary Care Provider: Charlee Hayes MD   [place] Board of Pharmacy Data Base Reviewed:    Yes; reviewed and no concerns for diversionary activity.  Most recent data includes:  02/10/2021  5   02/08/2021  Suboxone 4 Mg-1 MG SL Film  75.00  30 Kr Abhijit   [id number]   Wal ([id number])   0  10.00 mg  [place]icaid [place]   02/08/2021  4   02/08/2021  Suboxone 4 Mg-1 MG SL Film  15.00  5 Kr Abhijit            Brief History:    Initial visit with me on 12/13/2019 when UDS was positive for amphetamines and methamphetamine.  History of opioid and alcohol use disorders, major depression, generalized anxiety, initial insomnia, and chronic pain secondary to osteoarthritis of lumbar spine.  No alcohol use since 2015.  Completed treatment at [facility] in 11/2015 after detox stay at [facility].  No hx seizures or DTs.  Abusing opioid pain pills since 2011.  Once he stopped drinking in 2015 he started using more opioid pain pills and self-medicating back pain using up to 60 mg oxycontin daily (smoking) and tried heroin a few times.  No hx IVDU.  History of hallucinogen and cocaine use in 2009 and earlier.  Longest period of sobriety 4 years.  Hx of suboxone maintenance through [facility] for 4  months and was taking 16-4mg daily.      Hepatitis C Status -  02/19/2020 reactive and HCV RNA quant 12,443 international unit(s)/mL.  Completed treatment.      Date of last use:   2/8/2020 - heroin, alcohol, methamphetamine    Last UDS on file:  12/27/2020 - positive for delta-9 THC (pt reports taking CBD, denies marijuana use - confirmation testing negative), positive for buprenorphine and negative for all other substances as expected.     HPI:    2/24/2021  Patient status since last visit: overall stable     Cravings: denies     MAT Dose: adequate    Side Effects: none     Cues to use and relapse triggers: None at this time     He reports high stress related to upcoming family court and possible  representation that makes him concerned.  He reports taking an extra suboxone film the last several days in the middle of the night to manage back pain.  He states work has been busier and thinks they may be contributing to increased back tension.  Sleep unchanged per patient.  He has been trying to stretch and realized his limited flexibility compared to previous mobility.  Limited activity outside of work and discussed regular exercise routine to maintain back strength.  He has completed PT in the past but work schedule limits his ability to participate in PT at this time.     Outside UDS from 12/27/2020 received and filed in media portion of Epic by [facility].  Despite stress, declined need to increase lexapro back to 30 mg PO once daily.  States he wants to limit his medication use.  No other concerns at this time.     Social History:   Reviewed today.  See HPI for changes since last visit.     Problem list and histories reviewed & adjusted, as indicated.  Additional history: as documented    Patient Active Problem List    Diagnosis Date Noted     Opioid use disorder, severe, dependence (H) with chronic pain 12/11/2020     Priority: Medium     Tobacco dependence 12/11/2020     Priority: Medium     History of  hepatitis C virus infection 08/28/2020     Priority: Medium     Primary osteoarthritis of lumbar spine 04/23/2019     Priority: Medium     Strain of lumbar region, initial encounter 04/23/2019     Priority: Medium     Benign meningioma of brain (H)      Priority: Medium     Stasis dermatitis of both legs 12/22/2017     Priority: Medium     Trichiasis of right lower eyelid 08/03/2017     Priority: Medium     Eyelid laceration, right, initial encounter 03/02/2017     Priority: Medium     Poisoning by benzodiazepines, undetermined, initial encounter 02/22/2016     Priority: Medium     Generalized anxiety disorder 09/28/2015     Priority: Medium     Diagnosis updated by automated process. Provider to review and confirm.       Alcohol abuse 09/14/2015     Priority: Medium     Lower urinary tract infectious disease 11/27/2014     Priority: Medium     Leukocytosis 11/27/2014     Priority: Medium     Nodule of right lung 11/26/2014     Priority: Medium     Overview:   Repeat CT chest recommended in 6 months, 12 months and 24 months       Kidney stone 11/26/2014     Priority: Medium     Controlled substance agreement signed 08/14/2013     Priority: Medium     Overview:   Was getting alprazolam from an outside provider. Will not prescribe controlled substances for pt.        Seasonal allergies 03/12/2013     Priority: Medium     Moderate major depression (H) 01/17/2011     Priority: Medium     CARDIOVASCULAR SCREENING; LDL GOAL LESS THAN 160 10/31/2010     Priority: Medium       Current Medications:  buprenorphine HCl-naloxone HCl (SUBOXONE) 4-1 MG per film, Place 1 Film under the tongue 3 times daily  escitalopram (LEXAPRO) 20 MG tablet, Take 1 tablet (20 mg) by mouth daily  gabapentin (NEURONTIN) 300 MG capsule, 2 tab tid ( 600 mg ) increased dose.  ibuprofen (ADVIL/MOTRIN) 800 MG tablet, Take 1 tablet (800 mg) by mouth every 8 hours as needed for moderate pain  Lidocaine (LIDOCARE) 4 % Patch, Place 1 patch onto the skin  every 24 hours To prevent lidocaine toxicity, patient should be patch free for 12 hrs daily.  naloxone (NARCAN) 4 MG/0.1ML nasal spray, Spray 1 spray (4 mg) into one nostril alternating nostrils as needed for opioid reversal every 2-3 minutes until assistance arrives  nicotine (NICORETTE) 4 MG lozenge, Place 1 lozenge (4 mg) inside cheek as needed for smoking cessation  ofloxacin (OCUFLOX) 0.3 % ophthalmic solution, Place 1-2 drops into the right eye 4 times daily  zolpidem (AMBIEN) 5 MG tablet, Take 1 tablet (5 mg) by mouth nightly as needed for sleep    No current facility-administered medications on file prior to visit.       OBJECTIVE                                                    LABS:  Labs reviewed. No UDS collected as patient evaluated over TELEPHONE visit.     ASSESSMENT/PLAN                                                    (F11.20) Opioid use disorder, severe, dependence (H) with chronic pain  (primary encounter diagnosis)  Plan: Continue buprenorphine HCl-naloxone HCl (SUBOXONE) 4-1MG per film; place 1 film under the tongue three times daily.  Has refill on file    Patient encourage to work on physical activity including stretching to manage back tension as well as resuming back care exercises from previous PT treatment for mood and physical health benefit instead of increasing suboxone to manage increased back pain at this time.      Continue with sponsorship and staying engaged in the recovery community.    UDS only visit scheduled for 3/12/2021.  No refills to be provided until this UDS completed as no longer in sober living with UDS monitoring.      (F10.20) Alcohol use disorder, severe, in controlled environment (H)  (F19.10) Polysubstance abuse (H)  Comment: hx stimulant, alcohol and pain pill/heroin use.  Hx long-term rx benzodiazepine use with diversionary behaviors.    Plan: Continue sponsorship and complete abstinence as above, including refraining from prescription or illicit  benzodiazepine use.      (F32.1) Moderate Major depression (H)  Plan:  Continue with lexapro 20 mg daily through upcoming stress of custody hearings in April, then evaluate ability to continue to taper off lexapro while monitoring for return of depression or anxiety sx.        (Z79.899) High risk medication use  Plan: UDS as above, monitor for cravings and return to use as well as pain control       ENCOUNTER FOR LONG TERM USE OF HIGH RISK MEDICATION   High Risk Drug Monitoring?  YES   Drug being monitored: Buprenorphine   Reason for drug: Opioid use disorder    What is being monitored?: Dosage, Cravings, Trigger, side effects, and continued abstinence.      Follow-up: 1 month or sooner if needed     Patient counseling completed today:  Counseled the patient on the importance of regular exercise/movement to manage chronic back pain as well as management of mood and sleep.        I have reviewed the note as documented above.  This accurately captures the substance of my conversation with the patient.      Linda Benoit, MORGAN  Psychiatric Mental Health Nurse Practitioner  [facility] - [facility] Medicine  [facility]  [phone]

## 2021-03-15 ENCOUNTER — DOCUMENTATION ONLY (OUTPATIENT)
Dept: ADDICTION MEDICINE | Facility: CLINIC | Age: 41
End: 2021-03-15

## 2021-03-15 DIAGNOSIS — F11.20 OPIOID USE DISORDER, SEVERE, DEPENDENCE (H): Primary | ICD-10-CM

## 2021-03-15 DIAGNOSIS — F11.20 OPIOID USE DISORDER, SEVERE, DEPENDENCE (H): ICD-10-CM

## 2021-03-15 PROCEDURE — 80306 DRUG TEST PRSMV INSTRMNT: CPT | Performed by: NURSE PRACTITIONER

## 2021-03-15 NOTE — PROGRESS NOTES
Patient arrived for Lab visit today at [facility], for Tox13, but no current orders were found.  Urine specimen was collected and held, per patient request.  Please review pended orders, complete necessary items, and sign.  If testing is not needed, please delete order.  Please contact patient with follow-up instructions as needed.  Please contact lab staff with any questions, at [phone].  Thank you.  Sonia Ferrer CMA([facility])

## 2021-03-21 ENCOUNTER — HEALTH MAINTENANCE LETTER (OUTPATIENT)
Age: 41
End: 2021-03-21

## 2021-03-23 ENCOUNTER — E-VISIT (OUTPATIENT)
Dept: URGENT CARE | Facility: CLINIC | Age: 41
End: 2021-03-23
Payer: COMMERCIAL

## 2021-03-23 DIAGNOSIS — Z20.822 SUSPECTED COVID-19 VIRUS INFECTION: ICD-10-CM

## 2021-03-23 DIAGNOSIS — Z20.822 SUSPECTED COVID-19 VIRUS INFECTION: Primary | ICD-10-CM

## 2021-03-23 LAB
SARS-COV-2 RNA RESP QL NAA+PROBE: NORMAL
SPECIMEN SOURCE: NORMAL

## 2021-03-23 PROCEDURE — U0003 INFECTIOUS AGENT DETECTION BY NUCLEIC ACID (DNA OR RNA); SEVERE ACUTE RESPIRATORY SYNDROME CORONAVIRUS 2 (SARS-COV-2) (CORONAVIRUS DISEASE [COVID-19]), AMPLIFIED PROBE TECHNIQUE, MAKING USE OF HIGH THROUGHPUT TECHNOLOGIES AS DESCRIBED BY CMS-2020-01-R: HCPCS | Performed by: EMERGENCY MEDICINE

## 2021-03-23 PROCEDURE — U0005 INFEC AGEN DETEC AMPLI PROBE: HCPCS | Performed by: EMERGENCY MEDICINE

## 2021-03-23 PROCEDURE — 99421 OL DIG E/M SVC 5-10 MIN: CPT | Performed by: EMERGENCY MEDICINE

## 2021-03-23 NOTE — PATIENT INSTRUCTIONS
Dear Edgar Williamson,    Your symptoms show that you may have coronavirus (COVID-19). This illness can cause fever, cough and trouble breathing. Many people get a mild case and get better on their own. Some people can get very sick.    Will I be tested for COVID-19?  We would like to test you for Covid-19 virus. I have placed orders for this test.     To schedule: go to your [facility] home page and scroll down to the section that says  You have an appointment that needs to be scheduled  and click the large green button that says  Schedule Now  and follow the steps to find the next available openings.    If you are unable to complete these [facility] scheduling steps, please call [phone] to schedule your testing.     Return to work/school/ guidance:  Please let your workplace manager and staffing office know when your quarantine ends     We can t give you an exact date as it depends on the above. You can calculate this on your own or work with your manager/staffing office to calculate this. (For example if you were exposed on 10/4, you would have to quarantine for 14 full days. That would be through 10/18. You could return on 10/19.)      If you receive a positive COVID-19 test result, follow the guidance of the those who are giving you the results. Usually the return to work is 10 (or in some cases 20 days from symptom onset.) If you work at [employer], you must also be cleared by Employee Occupational Health and Safety to return to work.        If you receive a negative COVID-19 test result and did not have a high risk exposure to someone with a known positive COVID-19 test, you can return to work once you're free of fever for 24 hours without fever-reducing medication and your symptoms are improving or resolved.      If you receive a negative COVID-19 test and If you had a high risk exposure to someone who has tested positive for COVID-19 then you can return to work 14 days after your last  contact with the positive individual    Note: If you have ongoing exposure to the covid positive person, this quarantine period may be more than 14 days. (For example, if you are continued to be exposed to your child who tested positive and cannot isolate from them, then the quarantine of 7-14 days can't start until your child is no longer contagious. This is typically 10 days from onset of the child's symptoms. So the total duration may be 17-24 days in this case.)    Sign up for [employer].   We know it's scary to hear that you might have COVID-19. We want to track your symptoms to make sure you're okay over the next 2 weeks. Please look for an email from [employer]--this is a free, online program that we'll use to keep in touch. To sign up, follow the link in the email you will receive. Learn more at [email or web address]    How can I take care of myself?    Get lots of rest. Drink extra fluids (unless a doctor has told you not to)    Take Tylenol (acetaminophen) or ibuprofen for fever or pain. If you have liver or kidney problems, ask your family doctor if it's okay to take Tylenol o ibuprofen    If you have other health problems (like cancer, heart failure, an organ transplant or severe kidney disease): Call your specialty clinic if you don't feel better in the next 2 days.    Know when to call 911. Emergency warning signs include:  o Trouble breathing or shortness of breath  o Pain or pressure in the chest that doesn't go away  o Feeling confused like you haven't felt before, or not being able to wake up  o Bluish-colored lips or face    Where can I get more information?  [facility] - About COVID-19:   [email or web address]    CDC - What to Do If You're Sick:   www.cdc.gov/coronavirus/2019-ncov/about/steps-when-sick.html    March 23, 2021  RE:  Edgar Williamson                                                                                                                   [id number]  [facility] [address]      To whom it may concern:    I evaluated Edgar Williamson on March 23, 2021. Edgar Williamson should be excused from work/school.     They should let their workplace manager and staffing office know when their quarantine ends.    We can not give an exact date as it depends on the information below. They can calculate this on their own or work with their manager/staffing office to calculate this. (For example if they were exposed on 10/04, they would have to quarantine for 14 full days. That would be through 10/18. They could return on 10/19.)    Quarantine Guidelines:      If patient receives a positive COVID-19 test result, they should follow the guidance of those who are giving the results. Usually the return to work is 10 (or in some cases 20 days from symptom onset.) If they work at [employer], they must be cleared by Employee Occupational Health and Safety to return to work.        If patient receives a negative COVID-19 test result and did not have a high risk exposure to someone with a known positive COVID-19 test, they can return to work once they're free of fever for 24 hours without fever-reducing medication and their symptoms are improving or resolved.      If patient receives a negative COVID-19 test and if they had a high risk exposure to someone who has tested positive for COVID-19 then they can return to work 14 days after their last contact with the positive individual    Note: If there is ongoing exposure to the covid positive person, this quarantine period may be longer than 14 days. (For example, if they are continually exposed to their child, who tested positive and cannot isolate from them, then the quarantine of 7-14 days can't start until their child is no longer contagious. This is typically 10 days from onset to the child's symptoms. So the total duration may be 17-24 days in this case.)     Sincerely,  Lei Carson MD

## 2021-03-24 LAB
LABORATORY COMMENT REPORT: NORMAL
SARS-COV-2 RNA RESP QL NAA+PROBE: NEGATIVE
SPECIMEN SOURCE: NORMAL

## 2021-04-05 DIAGNOSIS — F11.20 OPIOID USE DISORDER, SEVERE, DEPENDENCE (H): ICD-10-CM

## 2021-04-05 RX ORDER — BUPRENORPHINE AND NALOXONE 4; 1 MG/1; MG/1
1 FILM, SOLUBLE BUCCAL; SUBLINGUAL 3 TIMES DAILY
Qty: 30 FILM | Refills: 0 | Status: SHIPPED | OUTPATIENT
Start: 2021-04-05 | End: 2021-04-16

## 2021-04-05 NOTE — TELEPHONE ENCOUNTER
Date of Last Office Visit: 2/24/21  Date of Next Office Visit: None scheduled; intake to contact pt  No shows since last visit: 0  Cancellations since last visit: 0    Medication requested: buprenorphine-naloxone 4 mg Date last ordered: 2/8/21 Qty: 90 Refills: 1     Review of [place] ?: Yes  Medication last filled date: 3/5/21 and 3/8/21 Qty filled: total of 90  Other controlled substance on [place] ?: Gabapentin and ambien. Tylenol #3 on 11/12/20.    Lapse in medication adherence greater than 5 days?: No  If yes, call patient and gather details: N/A  Medication refill request verified as identical to current order?: Yes  Result of Last DAM, VPA, Li+ Level, CBC, or Carbamazepine Level (at or since last visit): Negative UDS on 3/15/21.    []Medication refilled per  Medication Refill in Ambulatory Care  policy.  [x]Medication unable to be refilled by RN due to criteria not met as indicated below:    []Eligibility - not seen in the last year   []Supervision - no future appointment   []Compliance - no shows, cancellations or lapse in therapy   []Verification - order discrepancy   [x]Controlled medication   []Medication not included in policy   []90-day supply request   []Other

## 2021-04-16 ENCOUNTER — OFFICE VISIT (OUTPATIENT)
Dept: ADDICTION MEDICINE | Facility: CLINIC | Age: 41
End: 2021-04-16
Payer: COMMERCIAL

## 2021-04-16 VITALS
BODY MASS INDEX: 29.08 KG/M2 | HEART RATE: 76 BPM | DIASTOLIC BLOOD PRESSURE: 58 MMHG | WEIGHT: 210 LBS | TEMPERATURE: 99.6 F | SYSTOLIC BLOOD PRESSURE: 100 MMHG | OXYGEN SATURATION: 97 %

## 2021-04-16 DIAGNOSIS — B18.2 CHRONIC HEPATITIS C WITHOUT HEPATIC COMA (H): ICD-10-CM

## 2021-04-16 DIAGNOSIS — Z11.3 SCREENING EXAMINATION FOR SEXUALLY TRANSMITTED DISEASE: ICD-10-CM

## 2021-04-16 DIAGNOSIS — F11.20 OPIOID USE DISORDER, SEVERE, DEPENDENCE (H): ICD-10-CM

## 2021-04-16 DIAGNOSIS — F11.20 OPIOID USE DISORDER, SEVERE, DEPENDENCE (H): Primary | ICD-10-CM

## 2021-04-16 DIAGNOSIS — F17.200 TOBACCO DEPENDENCE: ICD-10-CM

## 2021-04-16 DIAGNOSIS — F10.21 ALCOHOL USE DISORDER, SEVERE, IN SUSTAINED REMISSION (H): ICD-10-CM

## 2021-04-16 DIAGNOSIS — F32.1 MODERATE MAJOR DEPRESSION (H): ICD-10-CM

## 2021-04-16 LAB
AMPHETAMINES UR QL: NOT DETECTED NG/ML
BARBITURATES UR QL SCN: NOT DETECTED NG/ML
BENZODIAZ UR QL SCN: NOT DETECTED NG/ML
BUPRENORPHINE UR QL: ABNORMAL NG/ML
CANNABINOIDS UR QL: ABNORMAL NG/ML
COCAINE UR QL SCN: NOT DETECTED NG/ML
D-METHAMPHET UR QL: NOT DETECTED NG/ML
METHADONE UR QL SCN: NOT DETECTED NG/ML
OPIATES UR QL SCN: NOT DETECTED NG/ML
OXYCODONE UR QL SCN: NOT DETECTED NG/ML
PCP UR QL SCN: NOT DETECTED NG/ML
PROPOXYPH UR QL: NOT DETECTED NG/ML
TRICYCLICS UR QL SCN: NOT DETECTED NG/ML

## 2021-04-16 PROCEDURE — 87389 HIV-1 AG W/HIV-1&-2 AB AG IA: CPT | Performed by: FAMILY MEDICINE

## 2021-04-16 PROCEDURE — 87522 HEPATITIS C REVRS TRNSCRPJ: CPT | Performed by: FAMILY MEDICINE

## 2021-04-16 PROCEDURE — 80306 DRUG TEST PRSMV INSTRMNT: CPT | Performed by: FAMILY MEDICINE

## 2021-04-16 PROCEDURE — 99000 SPECIMEN HANDLING OFFICE-LAB: CPT | Performed by: FAMILY MEDICINE

## 2021-04-16 PROCEDURE — 99214 OFFICE O/P EST MOD 30 MIN: CPT | Performed by: FAMILY MEDICINE

## 2021-04-16 PROCEDURE — 36415 COLL VENOUS BLD VENIPUNCTURE: CPT | Performed by: FAMILY MEDICINE

## 2021-04-16 PROCEDURE — 86780 TREPONEMA PALLIDUM: CPT | Mod: 90 | Performed by: FAMILY MEDICINE

## 2021-04-16 PROCEDURE — 87491 CHLMYD TRACH DNA AMP PROBE: CPT | Performed by: FAMILY MEDICINE

## 2021-04-16 PROCEDURE — 87591 N.GONORRHOEAE DNA AMP PROB: CPT | Performed by: FAMILY MEDICINE

## 2021-04-16 RX ORDER — BUPRENORPHINE AND NALOXONE 4; 1 MG/1; MG/1
1 FILM, SOLUBLE BUCCAL; SUBLINGUAL 3 TIMES DAILY
Qty: 90 FILM | Refills: 0 | Status: SHIPPED | OUTPATIENT
Start: 2021-04-16 | End: 2021-05-07

## 2021-04-16 NOTE — PROGRESS NOTES
SUBJECTIVE                                                    SUBSTANCE USE DISORDER FOLLOW UP:    Edgar Williamson is a 40 year old male who presents to clinic today for follow up of Opioid Use Disorder (OUD).    Visit performed In Person, face-to-face        TODAY'S VISIT  HPI Apr 16, 2021  - Mood stable, doing well. Anxiety well-controlled  - Taking CBD for anxiety instead of other medications  - No substance use otherwise - 14 months now  - No cravings  - Family court causing stress, but he is managing it as best he can. Completed initial hearing earlier this month  - New partner, which is exciting  - Uses NRT occasionally. Hard to commit to staying away from cigarettes  - Not using lexapro anymore; weaned off successfully. Suffered through brain shocks          Brief KIANA History  Per Linda Benoit:    Initial visit with me on 12/13/2019 when UDS was positive for amphetamines and methamphetamine.  History of opioid and alcohol use disorders, major depression, generalized anxiety, initial insomnia, and chronic pain secondary to osteoarthritis of lumbar spine.  No alcohol use since 2015.  Completed treatment at [facility] in 11/2015 after detox stay at [facility].  No hx seizures or DTs.  Abusing opioid pain pills since 2011.  Once he stopped drinking in 2015 he started using more opioid pain pills and self-medicating back pain using up to 60 mg oxycontin daily (smoking) and tried heroin a few times.  No hx IVDU.  History of hallucinogen and cocaine use in 2009 and earlier.  Longest period of sobriety 4 years.  Hx of suboxone maintenance through [facility] for 4 months and was taking 16-4mg daily.      Hepatitis C Status -  02/19/2020 reactive and HCV RNA quant 12,443 international unit(s)/mL.  Completed treatment.        Date of last use:   2/8/2020 - heroin, alcohol, methamphetamine    A/P                                                    ASSESSMENT/PLAN    Diagnoses and all orders for this  visit:  Opioid use disorder, severe, dependence (H) with chronic pain  -     Urine Drugs of Abuse Screen Panel 13; Future  -     buprenorphine HCl-naloxone HCl (SUBOXONE) 4-1 MG per film; Place 1 Film under the tongue 3 times daily  Screening examination for sexually transmitted disease  -     Neisseria gonorrhoeae PCR; Future  -     Chlamydia trachomatis PCR; Future  -     Treponema Abs w Reflex to RPR and Titer; Future  -     HIV Antigen Antibody Combo; Future  Chronic hepatitis C without hepatic coma (H)  -     Hepatitis C RNA quantitative; Future  Alcohol use disorder, severe, in sustained remission (H)  Moderate major depression (H)  Tobacco dependence      Orders Placed This Encounter   Medications     buprenorphine HCl-naloxone HCl (SUBOXONE) 4-1 MG per film     Sig: Place 1 Film under the tongue 3 times daily     Dispense:  90 Film     Refill:  0     NADEAN: [id number]; Please substitute for covered alternative per insurance request.         - Continue suboxone without changes  - Will be open to prescribing refill next month to bridge until he can see Linda Benoit in June. Pending clinical stability, does not need an appt with me.  - No changes to other medications - he stopped lexapro without any negative effects on his mental health  - Using NRT but hasn't quit yet      Pertinent Chronic Co-morbidities:  MDD  JULIETH  Tobacco Use Disorder  Hepatitis C s/p treatment      Patient has narcan?  Yes  Reported IVDU in past 2 months?  No  Infectious disease screening UTD?  Yes   - HIV + HepC test date: 4.16.21      RTC:  Future Appointments   Date Time Provider Department Center   5/12/2021  4:00 PM [facility] COVID VACCINE 21 DAY PFIZER [facility]   6/14/2021  4:00 PM Linda Benoit, CNP [facility]         ENCOUNTER FOR LONG TERM USE OF HIGH RISK MEDICATION   High Risk Drug Monitoring?  YES   Drug being monitored: buprenorphine   Reason for drug: Opioid Use Disorder (OUD)   What is being monitored?: Dosage,  Cravings, Trigger, side effects, and continued abstinence.    Counseled the patient on the importance of having a recovery program in addition to medication to manage recovery.  Components include avoiding isolating, having willingness to change, avoiding triggers and managing cravings. Encouraged having some type of sober network and practicing honesty with trusted support person(s). Encouraged other services such as counseling, 12 step or other self-help organizations.      Opioid warning reviewed.  Risk of overdose following a period of abstinence due to decrease tolerance was discussed including risk of death.  Strongly recommended abstain from alcohol, benzodiazepines, THC, opioids and other drugs of abuse.  Increased risk of return to opioid use after use of these substances discussed.  Increased risk of overdose/death with use of other substances particularly benzodiazepines/alcohol reviewed.    [facility] Board of Pharmacy Data Base Reviewed;   Consistent with patient reports and Epic records.          OBJECTIVE                                                    PHYSICAL EXAM:  /58   Pulse 76   Temp 99.6  F (37.6  C) (Temporal)   Wt 95.3 kg (210 lb)   SpO2 97%   BMI 29.08 kg/m      GENERAL: healthy, alert and no distress  EYES: Eyes grossly normal to inspection, PERRL and conjunctivae and sclerae normal  RESP: No respiratory distress  MENTAL STATUS EXAM  Appearance/Behavior: No appearant distress  Speech: Normal  Mood/Affect: normal affect  Insight: Adequate    LAB  Results for orders placed or performed in visit on 04/16/21   Treponema Abs w Reflex to RPR and Titer     Status: None   Result Value Ref Range    Treponema Antibodies Nonreactive NR^Nonreactive   HIV Antigen Antibody Combo     Status: None   Result Value Ref Range    HIV Antigen Antibody Combo Nonreactive NR^Nonreactive       Hepatitis C RNA quantitative     Status: None   Result Value Ref Range    HCV RNA Quant IU/ml HCV RNA Not  Detected HCVND^HCV RNA Not Detected [IU]/mL    Log of HCV RNA Qt Not Calculated <1.2 Log IU/mL   Urine Drugs of Abuse Screen Panel 13     Status: Abnormal   Result Value Ref Range    Cannabinoids (62-gyr-4-carboxy-9-THC) Detected, Abnormal Result (A) NDET^Not Detected ng/mL    Phencyclidine (Phencyclidine) Not Detected NDET^Not Detected ng/mL    Cocaine (Benzoylecgonine) Not Detected NDET^Not Detected ng/mL    Methamphetamine (d-Methamphetamine) Not Detected NDET^Not Detected ng/mL    Opiates (Morphine) Not Detected NDET^Not Detected ng/mL    Amphetamine (d-Amphetamine) Not Detected NDET^Not Detected ng/mL    Benzodiazepines (Nordiazepam) Not Detected NDET^Not Detected ng/mL    Tricyclic Antidepressants (Desipramine) Not Detected NDET^Not Detected ng/mL    Methadone (Methadone) Not Detected NDET^Not Detected ng/mL    Barbiturates (Butalbital) Not Detected NDET^Not Detected ng/mL    Oxycodone (Oxycodone) Not Detected NDET^Not Detected ng/mL    Propoxyphene (Norpropoxyphene) Not Detected NDET^Not Detected ng/mL    Buprenorphine (Buprenorphine) Detected, Abnormal Result (A) NDET^Not Detected ng/mL   Neisseria gonorrhoeae PCR     Status: None    Specimen: Urine   Result Value Ref Range    Specimen Descrip Urine     N Gonorrhea PCR Negative NEG^Negative   Chlamydia trachomatis PCR     Status: None    Specimen: Urine   Result Value Ref Range    Specimen Description Urine     Chlamydia Trachomatis PCR Negative NEG^Negative         HISTORY                                                    Problem list reviewed & adjusted, as indicated.  Patient Active Problem List   Diagnosis     CARDIOVASCULAR SCREENING; LDL GOAL LESS THAN 160     Moderate major depression (H)     Generalized anxiety disorder     Benign meningioma of brain (H)     Primary osteoarthritis of lumbar spine     Strain of lumbar region, initial encounter     Trichiasis of right lower eyelid     Stasis dermatitis of both legs     Seasonal allergies     Poisoning  by benzodiazepines, undetermined, initial encounter     Nodule of right lung     Lower urinary tract infectious disease     Leukocytosis     Kidney stone     Eyelid laceration, right, initial encounter     Alcohol abuse     Controlled substance agreement signed     History of hepatitis C virus infection     Opioid use disorder, severe, dependence (H) with chronic pain     Tobacco dependence         MEDICATION LIST (prior to visit)  gabapentin (NEURONTIN) 300 MG capsule, 2 tab tid ( 600 mg ) increased dose.  ibuprofen (ADVIL/MOTRIN) 800 MG tablet, Take 1 tablet (800 mg) by mouth every 8 hours as needed for moderate pain  Lidocaine (LIDOCARE) 4 % Patch, Place 1 patch onto the skin every 24 hours To prevent lidocaine toxicity, patient should be patch free for 12 hrs daily.  naloxone (NARCAN) 4 MG/0.1ML nasal spray, Spray 1 spray (4 mg) into one nostril alternating nostrils as needed for opioid reversal every 2-3 minutes until assistance arrives  nicotine (NICORETTE) 4 MG lozenge, Place 1 lozenge (4 mg) inside cheek as needed for smoking cessation  ofloxacin (OCUFLOX) 0.3 % ophthalmic solution, Place 1-2 drops into the right eye 4 times daily  zolpidem (AMBIEN) 5 MG tablet, Take 1 tablet (5 mg) by mouth nightly as needed for sleep    No current facility-administered medications on file prior to visit.       Allergies   Allergen Reactions     Diphenhydramine Other (See Comments)     Restless Legs/Feet  Other reaction(s): Restless Legs/Feet  Restless legs  Other reaction(s): Restless Legs/Feet       Methadone Other (See Comments)     Had terrible itching and redness and was pulled off methadone     Mirtazapine Other (See Comments)     Restless Legs/Feet  Other reaction(s): Restless Legs/Feet  Restless legs.  Other reaction(s): Restless Legs/Feet       Seasonal Allergies      Trazodone Other (See Comments) and Unknown     Behavioral Disturbances  Other reaction(s): Behavioral Disturbances  Behavorial disturbance.  -  restless legs             Rober Mauricio MD  [facility]  [phone]

## 2021-04-17 LAB
C TRACH DNA SPEC QL NAA+PROBE: NEGATIVE
N GONORRHOEA DNA SPEC QL NAA+PROBE: NEGATIVE
SPECIMEN SOURCE: NORMAL
SPECIMEN SOURCE: NORMAL
T PALLIDUM AB SER QL: NONREACTIVE

## 2021-04-18 LAB — HIV 1+2 AB+HIV1 P24 AG SERPL QL IA: NONREACTIVE

## 2021-04-19 LAB
HCV RNA SERPL NAA+PROBE-ACNC: NORMAL [IU]/ML
HCV RNA SERPL NAA+PROBE-LOG IU: NORMAL LOG IU/ML

## 2021-04-21 ENCOUNTER — IMMUNIZATION (OUTPATIENT)
Dept: NURSING | Facility: CLINIC | Age: 41
End: 2021-04-21
Payer: COMMERCIAL

## 2021-04-21 PROCEDURE — 91300 PR COVID VAC PFIZER DIL RECON 30 MCG/0.3 ML IM: CPT

## 2021-04-21 PROCEDURE — 0001A PR COVID VAC PFIZER DIL RECON 30 MCG/0.3 ML IM: CPT

## 2021-05-07 ENCOUNTER — TELEPHONE (OUTPATIENT)
Dept: ADDICTION MEDICINE | Facility: CLINIC | Age: 41
End: 2021-05-07

## 2021-05-07 DIAGNOSIS — F11.20 OPIOID USE DISORDER, SEVERE, DEPENDENCE (H): ICD-10-CM

## 2021-05-07 RX ORDER — BUPRENORPHINE AND NALOXONE 4; 1 MG/1; MG/1
1 FILM, SOLUBLE BUCCAL; SUBLINGUAL 3 TIMES DAILY
Qty: 90 FILM | Refills: 0 | Status: SHIPPED | OUTPATIENT
Start: 2021-05-07 | End: 2021-06-14

## 2021-05-07 NOTE — TELEPHONE ENCOUNTER
"Called James to briefly check in. Doing well, no concerns, \"steady\". No cravings, no use.    No changes to suboxone, refill as planned. Will see Linda Benoit in a month. Advised she will be leaving the clinic shortly after their visit, and I will continue working with him.    He is no longer taking ambien - this was removed from his med list.    Rober Mauricio MD    "

## 2021-05-12 ENCOUNTER — IMMUNIZATION (OUTPATIENT)
Dept: NURSING | Facility: CLINIC | Age: 41
End: 2021-05-12
Attending: FAMILY MEDICINE
Payer: COMMERCIAL

## 2021-05-12 PROCEDURE — 91300 PR COVID VAC PFIZER DIL RECON 30 MCG/0.3 ML IM: CPT

## 2021-05-12 PROCEDURE — 0002A PR COVID VAC PFIZER DIL RECON 30 MCG/0.3 ML IM: CPT

## 2021-06-14 ENCOUNTER — OFFICE VISIT (OUTPATIENT)
Dept: ADDICTION MEDICINE | Facility: CLINIC | Age: 41
End: 2021-06-14
Payer: COMMERCIAL

## 2021-06-14 VITALS
DIASTOLIC BLOOD PRESSURE: 70 MMHG | OXYGEN SATURATION: 98 % | HEART RATE: 77 BPM | BODY MASS INDEX: 28.11 KG/M2 | WEIGHT: 203 LBS | SYSTOLIC BLOOD PRESSURE: 118 MMHG | TEMPERATURE: 98.4 F

## 2021-06-14 DIAGNOSIS — F19.10 POLYSUBSTANCE ABUSE (H): ICD-10-CM

## 2021-06-14 DIAGNOSIS — Z79.899 HIGH RISK MEDICATION USE: ICD-10-CM

## 2021-06-14 DIAGNOSIS — F11.21 OPIOID USE DISORDER, SEVERE, IN SUSTAINED REMISSION, ON MAINTENANCE THERAPY (H): Primary | ICD-10-CM

## 2021-06-14 DIAGNOSIS — Z86.19 HISTORY OF HEPATITIS C VIRUS INFECTION: ICD-10-CM

## 2021-06-14 DIAGNOSIS — F11.20 OPIOID USE DISORDER, SEVERE, DEPENDENCE (H): ICD-10-CM

## 2021-06-14 PROCEDURE — 99214 OFFICE O/P EST MOD 30 MIN: CPT | Performed by: NURSE PRACTITIONER

## 2021-06-14 RX ORDER — BUPRENORPHINE AND NALOXONE 4; 1 MG/1; MG/1
1 FILM, SOLUBLE BUCCAL; SUBLINGUAL 3 TIMES DAILY
Qty: 90 FILM | Refills: 0 | Status: SHIPPED | OUTPATIENT
Start: 2021-06-14 | End: 2021-07-30

## 2021-06-14 RX ORDER — BUPRENORPHINE AND NALOXONE 4; 1 MG/1; MG/1
1 FILM, SOLUBLE BUCCAL; SUBLINGUAL 3 TIMES DAILY
Qty: 90 FILM | Refills: 1 | Status: SHIPPED | OUTPATIENT
Start: 2021-07-12 | End: 2021-08-12 | Stop reason: DRUGHIGH
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
## 2021-06-14 NOTE — PATIENT INSTRUCTIONS
To find a suboxone provider closer to home you can search on this website by zip code     [email or web address]

## 2021-07-27 ENCOUNTER — MYC MEDICAL ADVICE (OUTPATIENT)
Dept: FAMILY MEDICINE | Facility: CLINIC | Age: 41
End: 2021-07-27

## 2021-07-27 DIAGNOSIS — M47.816 PRIMARY OSTEOARTHRITIS OF LUMBAR SPINE: ICD-10-CM

## 2021-07-27 RX ORDER — GABAPENTIN 300 MG/1
CAPSULE ORAL
Qty: 540 CAPSULE | Refills: 3 | Status: SHIPPED | OUTPATIENT
Start: 2021-07-27 | End: 2021-07-29

## 2021-07-27 NOTE — TELEPHONE ENCOUNTER
Reviewed with patient at time of the study   Routing refill request to provider for review/approval because:  Drug not on the [facility] refill protocol     Anna Garcia RN

## 2021-07-28 ENCOUNTER — TELEPHONE (OUTPATIENT)
Dept: FAMILY MEDICINE | Facility: CLINIC | Age: 41
End: 2021-07-28

## 2021-07-28 DIAGNOSIS — M47.816 PRIMARY OSTEOARTHRITIS OF LUMBAR SPINE: ICD-10-CM

## 2021-07-28 NOTE — TELEPHONE ENCOUNTER
"[facility] in [place] calling in regards to recent refill request for Gabapentin. Pharmacist states that patient picked up a 90 day supply on 5/18/21 - therefore patient should have enough medication to last until Mid-August.     RN sees a closed refill encounter from 7/27/21 regarding same refill. Patient sent a Mychart, and states that he needs a refill, as he's \"been out of medication for days.\" PCP did send refill to [facility] in [place], dated 7/27/21.     RN attempted to contact patient to confirm how he is taking this medication. Sig reflects 600mg TID. No answer, RN left VM to return call to clinic.     When patient returns call, plan to verify how he is taking gabapentin medication. If taking differently than prescribed, will need to notify PCP. Also plan to discuss why he ran out when he should have had enough to last until August.     Crista Velasquez, RN, BSN, PHN  [facility]: [place]    "

## 2021-07-29 RX ORDER — GABAPENTIN 300 MG/1
CAPSULE ORAL
Qty: 180 CAPSULE | Refills: 3 | Status: SHIPPED | OUTPATIENT
Start: 2021-07-29 | End: 2021-11-23

## 2021-07-29 NOTE — TELEPHONE ENCOUNTER
"RN spoke with patient.     Patient states that he feels the pharmacy has been \"shorting\" him tablets of gabapentin when he had refill recently.     Per Allison Mohamud, he received 90 tablet supply on 3/14/21 AND 5/18/21. Patient states he ran out a few days ago and that he does not feel he was given 540 tablets total for either refill.     In theory, patient should have received 180 day supply since 3/14/21.     Pharmacist wants PCP to be aware before filling refill that was sent 7/27/21 (orignally sent to [facility], has since been transferred to [place] per patient preference).     Patient states he has been taking Rx as prescribed, 2 tablets TID. Denies taking it differently or more than usual dose.    Patient also has upcoming appointment with addiction medicine on 8/3/21.     Routed to PCP to review and advise on whether pharmacy should fill 7/27/21 Rx.     Crista Velasquez, RN, BSN, PHN  [facility]: [place]      "

## 2021-07-29 NOTE — TELEPHONE ENCOUNTER
He takes 6 tab a day, 180 tab a month  Per , last refill was on 05/18/2021, received 540 tab.  Since 05/18/2021, that is 76 days, or so,   He  May run a few tab short  I think is ok to refill, one month at time.  I will sent a new refill of 180 tab for one month supply.  thanks

## 2021-07-30 NOTE — TELEPHONE ENCOUNTER
Called patient and informed him of full one months supply with an additional 3 refills.      Divya Weaver RN

## 2021-08-12 ENCOUNTER — VIRTUAL VISIT (OUTPATIENT)
Dept: ADDICTION MEDICINE | Facility: CLINIC | Age: 41
End: 2021-08-12
Payer: COMMERCIAL

## 2021-08-12 DIAGNOSIS — F11.20 OPIOID USE DISORDER, SEVERE, DEPENDENCE (H): Primary | ICD-10-CM

## 2021-08-12 DIAGNOSIS — F32.5 MAJOR DEPRESSIVE DISORDER IN FULL REMISSION, UNSPECIFIED WHETHER RECURRENT (H): ICD-10-CM

## 2021-08-12 PROCEDURE — 99214 OFFICE O/P EST MOD 30 MIN: CPT | Mod: 95 | Performed by: FAMILY MEDICINE

## 2021-08-12 RX ORDER — BUPRENORPHINE AND NALOXONE 8; 2 MG/1; MG/1
FILM, SOLUBLE BUCCAL; SUBLINGUAL
Qty: 60 FILM | Refills: 1 | Status: SHIPPED | OUTPATIENT
Start: 2021-08-12 | End: 2021-10-08

## 2021-08-12 ASSESSMENT — ANXIETY QUESTIONNAIRES
GAD7 TOTAL SCORE: 0
6. BECOMING EASILY ANNOYED OR IRRITABLE: NOT AT ALL
1. FEELING NERVOUS, ANXIOUS, OR ON EDGE: NOT AT ALL
GAD7 TOTAL SCORE: 0
4. TROUBLE RELAXING: NOT AT ALL
2. NOT BEING ABLE TO STOP OR CONTROL WORRYING: NOT AT ALL
3. WORRYING TOO MUCH ABOUT DIFFERENT THINGS: NOT AT ALL
GAD7 TOTAL SCORE: 0
7. FEELING AFRAID AS IF SOMETHING AWFUL MIGHT HAPPEN: NOT AT ALL
5. BEING SO RESTLESS THAT IT IS HARD TO SIT STILL: NOT AT ALL
7. FEELING AFRAID AS IF SOMETHING AWFUL MIGHT HAPPEN: NOT AT ALL

## 2021-08-12 ASSESSMENT — PATIENT HEALTH QUESTIONNAIRE - PHQ9
SUM OF ALL RESPONSES TO PHQ QUESTIONS 1-9: 0
SUM OF ALL RESPONSES TO PHQ QUESTIONS 1-9: 0

## 2021-08-12 NOTE — PROGRESS NOTES
James is a 41 year old who is being evaluated via a billable video visit.      How would you like to obtain your AVS? MyChart  If the video visit is dropped, the invitation should be resent by: Text to cell phone: [phone]  Will anyone else be joining your video visit? No    Answers for HPI/ROS submitted by the patient on 8/12/2021  PHQ9 TOTAL SCORE: 0  JULIETH 7 TOTAL SCORE: 0
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
## 2021-08-13 ASSESSMENT — PATIENT HEALTH QUESTIONNAIRE - PHQ9: SUM OF ALL RESPONSES TO PHQ QUESTIONS 1-9: 0

## 2021-08-13 ASSESSMENT — ANXIETY QUESTIONNAIRES: GAD7 TOTAL SCORE: 0

## 2021-09-05 ENCOUNTER — HEALTH MAINTENANCE LETTER (OUTPATIENT)
Age: 41
End: 2021-09-05

## 2021-09-06 ENCOUNTER — MYC MEDICAL ADVICE (OUTPATIENT)
Dept: ADDICTION MEDICINE | Facility: CLINIC | Age: 41
End: 2021-09-06

## 2021-09-07 NOTE — TELEPHONE ENCOUNTER
Per provider directive, this RN called pharmacy to authorize early refill of suboxone. Staff reported they would fill it today.     Wtr replied to pt MyChart msg updating him on this authorization.    Tia Ibanez RN on 9/7/2021 at 3:26 PM

## 2021-10-08 ENCOUNTER — OFFICE VISIT (OUTPATIENT)
Dept: ADDICTION MEDICINE | Facility: CLINIC | Age: 41
End: 2021-10-08
Payer: COMMERCIAL

## 2021-10-08 VITALS
WEIGHT: 183.2 LBS | SYSTOLIC BLOOD PRESSURE: 122 MMHG | HEART RATE: 87 BPM | TEMPERATURE: 98.1 F | OXYGEN SATURATION: 97 % | DIASTOLIC BLOOD PRESSURE: 80 MMHG | BODY MASS INDEX: 25.37 KG/M2

## 2021-10-08 DIAGNOSIS — F32.5 MAJOR DEPRESSIVE DISORDER IN FULL REMISSION, UNSPECIFIED WHETHER RECURRENT (H): ICD-10-CM

## 2021-10-08 DIAGNOSIS — F11.20 OPIOID USE DISORDER, SEVERE, DEPENDENCE (H): Primary | ICD-10-CM

## 2021-10-08 LAB — CREAT UR-MCNC: 225 MG/DL

## 2021-10-08 PROCEDURE — 80307 DRUG TEST PRSMV CHEM ANLYZR: CPT | Performed by: FAMILY MEDICINE

## 2021-10-08 PROCEDURE — 99214 OFFICE O/P EST MOD 30 MIN: CPT | Performed by: FAMILY MEDICINE

## 2021-10-08 RX ORDER — BUPRENORPHINE AND NALOXONE 8; 2 MG/1; MG/1
FILM, SOLUBLE BUCCAL; SUBLINGUAL
Qty: 60 FILM | Refills: 0 | Status: SHIPPED | OUTPATIENT
Start: 2021-10-08 | End: 2021-10-08

## 2021-10-08 RX ORDER — BUPRENORPHINE AND NALOXONE 8; 2 MG/1; MG/1
FILM, SOLUBLE BUCCAL; SUBLINGUAL
Qty: 60 FILM | Refills: 0 | Status: SHIPPED | OUTPATIENT
Start: 2021-11-05 | End: 2021-11-30
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
## 2021-10-12 LAB
BUPRENORPHINE UR CFM-MCNC: 106 NG/ML
BUPRENORPHINE/CREAT UR: 47 NG/MG {CREAT}
CREATININE URINE MG/DL  (SYNCED VALUE): 225 MG/DL
GABAPENTIN UR QL CFM: PRESENT
NALOXONE UR CFM-MCNC: 331 NG/ML
NALOXONE: 147 NG/MG {CREAT}
NORBUPRENORPHINE UR CFM-MCNC: 521 NG/ML
NORBUPRENORPHINE/CREAT UR: 232 NG/MG {CREAT}

## 2021-11-11 DIAGNOSIS — F11.20 OPIOID USE DISORDER, SEVERE, DEPENDENCE (H): ICD-10-CM

## 2021-11-11 NOTE — TELEPHONE ENCOUNTER
Date of Last Office Visit: 10/08/2021  Date of Next Office Visit: 11/30/2021  No shows since last visit: 0  Cancellations since last visit: 0    Medication requested: naloxone (NARCAN) 4 MG/0.1ML  Date last ordered: 05/22/2021 Qty: 0.2mL Refills: 3     Lapse in medication adherence greater than 5 days?: no  If yes, call patient and gather details: n/a  Medication refill request verified as identical to current order?: yes  Result of Last DAM, VPA, Li+ Level, CBC, or Carbamazepine Level (at or since last visit): N/A      []Medication refilled per  Medication Refill in Ambulatory Care  policy.  [x]Medication unable to be refilled by RN due to criteria not met as indicated below:    []Eligibility - not seen in the last year   []Supervision - no future appointment   []Compliance - no shows, cancellations or lapse in therapy   []Verification - order discrepancy   []Controlled medication   [x]Medication not included in policy   []90-day supply request   []Other

## 2021-11-30 ENCOUNTER — VIRTUAL VISIT (OUTPATIENT)
Dept: ADDICTION MEDICINE | Facility: CLINIC | Age: 41
End: 2021-11-30
Payer: COMMERCIAL

## 2021-11-30 DIAGNOSIS — F32.5 MAJOR DEPRESSIVE DISORDER IN FULL REMISSION, UNSPECIFIED WHETHER RECURRENT (H): ICD-10-CM

## 2021-11-30 DIAGNOSIS — F11.20 OPIOID USE DISORDER, SEVERE, DEPENDENCE (H): Primary | ICD-10-CM

## 2021-11-30 PROCEDURE — 99214 OFFICE O/P EST MOD 30 MIN: CPT | Mod: 95 | Performed by: FAMILY MEDICINE

## 2021-11-30 RX ORDER — BUPRENORPHINE AND NALOXONE 8; 2 MG/1; MG/1
FILM, SOLUBLE BUCCAL; SUBLINGUAL
Qty: 60 FILM | Refills: 1 | Status: SHIPPED | OUTPATIENT
Start: 2021-11-30 | End: 2022-01-28

## 2021-11-30 ASSESSMENT — PATIENT HEALTH QUESTIONNAIRE - PHQ9
SUM OF ALL RESPONSES TO PHQ QUESTIONS 1-9: 0
SUM OF ALL RESPONSES TO PHQ QUESTIONS 1-9: 0
10. IF YOU CHECKED OFF ANY PROBLEMS, HOW DIFFICULT HAVE THESE PROBLEMS MADE IT FOR YOU TO DO YOUR WORK, TAKE CARE OF THINGS AT HOME, OR GET ALONG WITH OTHER PEOPLE: NOT DIFFICULT AT ALL

## 2021-11-30 ASSESSMENT — ANXIETY QUESTIONNAIRES
7. FEELING AFRAID AS IF SOMETHING AWFUL MIGHT HAPPEN: NOT AT ALL
6. BECOMING EASILY ANNOYED OR IRRITABLE: NOT AT ALL
3. WORRYING TOO MUCH ABOUT DIFFERENT THINGS: NOT AT ALL
GAD7 TOTAL SCORE: 0
5. BEING SO RESTLESS THAT IT IS HARD TO SIT STILL: NOT AT ALL
7. FEELING AFRAID AS IF SOMETHING AWFUL MIGHT HAPPEN: NOT AT ALL
8. IF YOU CHECKED OFF ANY PROBLEMS, HOW DIFFICULT HAVE THESE MADE IT FOR YOU TO DO YOUR WORK, TAKE CARE OF THINGS AT HOME, OR GET ALONG WITH OTHER PEOPLE?: NOT DIFFICULT AT ALL
GAD7 TOTAL SCORE: 0
4. TROUBLE RELAXING: NOT AT ALL
1. FEELING NERVOUS, ANXIOUS, OR ON EDGE: NOT AT ALL
GAD7 TOTAL SCORE: 0
2. NOT BEING ABLE TO STOP OR CONTROL WORRYING: NOT AT ALL
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
## 2021-11-30 NOTE — PROGRESS NOTES
James is a 41 year old who is being evaluated via a billable video visit.      How would you like to obtain your AVS? [employer]  If the video visit is dropped, the invitation should be resent by: Text to cell phone: [phone]  Will anyone else be joining your video visit? No

## 2021-12-01 ASSESSMENT — ANXIETY QUESTIONNAIRES: GAD7 TOTAL SCORE: 0

## 2021-12-01 ASSESSMENT — PATIENT HEALTH QUESTIONNAIRE - PHQ9: SUM OF ALL RESPONSES TO PHQ QUESTIONS 1-9: 0

## 2021-12-15 ENCOUNTER — TELEPHONE (OUTPATIENT)
Dept: FAMILY MEDICINE | Facility: CLINIC | Age: 41
End: 2021-12-15
Payer: COMMERCIAL

## 2021-12-15 DIAGNOSIS — L70.9 ACNE, UNSPECIFIED ACNE TYPE: Primary | ICD-10-CM

## 2021-12-15 NOTE — TELEPHONE ENCOUNTER
Relayed information to the patient, gave number for derm to call to schedule.     Tayler Varma/BLAISE VIDAL

## 2021-12-15 NOTE — TELEPHONE ENCOUNTER
Dr. Hayes,    Edgar Williamson would like to request a referral.  Reason: Scalp break out...acne like  Requested provider: Dermatologist  Comment:  I am looking to get an appointment with a derm

## 2022-01-06 NOTE — PROGRESS NOTES
"  Assessment & Plan     Folliculitis  Scalp and chin folliculitis, recurrent, no previous treatment.  We will initiate cephalexin for 10 days, also recommended selenium sulfide based shampoo.  He will follow-up no improvement or any worsening.  - cephALEXin (KEFLEX) 500 MG capsule; Take 1 capsule (500 mg) by mouth 3 times daily for 10 days    Tobacco Cessation:   reports that he has been smoking cigarettes. He has been smoking about 0.25 packs per day. He has never used smokeless tobacco.      BMI:   Estimated body mass index is 25.95 kg/m  as calculated from the following:    Height as of this encounter: 1.822 m (5' 11.75\").    Weight as of this encounter: 86.2 kg (190 lb).         Return in about 4 weeks (around 2/4/2022) for Annual Well Check.    Farhat Marquez MD  [facility] TARA Ramirez is a 41 year old who presents for the following health issues    accompanied by his self.  History of Present Illness       He eats 0-1 servings of fruits and vegetables daily.He consumes 5 sweetened beverage(s) daily.He exercises with enough effort to increase his heart rate 9 or less minutes per day.  He exercises with enough effort to increase his heart rate 3 or less days per week.   He is taking medications regularly.       Skin Lesion  Onset/Duration: 1 year  Description  Location: head  Color: skin colored  Border description: raised  Character: draining  Itching: mild  Bleeding:  YES  Intensity:  moderate  Progression of Symptoms:  worsening  Accompanying signs and symptoms:   Bleeding: YES  Scaling: YES  Excessive sun exposure/tanning: no  Sunscreen used: YES and a hat  History:           Any previous history of skin cancer: no  Any family history of melanoma: no  Previous episodes of similar lesion: no  Precipitating or alleviating factors: none  Therapies tried and outcome: none      Review of Systems   Constitutional, HEENT, cardiovascular, pulmonary, gi and gu systems are negative, " "except as otherwise noted.      Objective    /78 (BP Location: Left arm, Patient Position: Chair, Cuff Size: Adult Regular)   Pulse 52   Temp 97.1  F (36.2  C) (Temporal)   Resp 16   Ht 1.822 m (5' 11.75\")   Wt 86.2 kg (190 lb)   SpO2 98%   BMI 25.95 kg/m    Body mass index is 25.95 kg/m .  Physical Exam   GENERAL: healthy, alert and no distress  EYES: Eyes grossly normal to inspection, PERRL and conjunctivae and sclerae normal  HENT: ear canals and TM's normal, nose and mouth without ulcers or lesions  NECK: no adenopathy, no asymmetry, masses, or scars and thyroid normal to palpation  RESP: lungs clear to auscultation - no rales, rhonchi or wheezes  CV: regular rate and rhythm, normal S1 S2, no S3 or S4, no murmur, click or rub, no peripheral edema and peripheral pulses strong  MS: no gross musculoskeletal defects noted, no edema  SKIN: Scattered small pustules, scalp and chin, follicle-based  NEURO: Normal strength and tone, mentation intact and speech normal  PSYCH: mentation appears normal, affect normal/bright                    Answers for HPI/ROS submitted by the patient on 1/7/2022    If you checked off any problems, how difficult have these problems made it for you to do your work, take care of things at home, or get along with other people?: Not difficult at all  PHQ9 TOTAL SCORE: 0  JULIETH 7 TOTAL SCORE: 0      "

## 2022-01-07 ENCOUNTER — OFFICE VISIT (OUTPATIENT)
Dept: FAMILY MEDICINE | Facility: CLINIC | Age: 42
End: 2022-01-07
Payer: COMMERCIAL

## 2022-01-07 VITALS
RESPIRATION RATE: 16 BRPM | HEART RATE: 52 BPM | WEIGHT: 190 LBS | TEMPERATURE: 97.1 F | HEIGHT: 72 IN | SYSTOLIC BLOOD PRESSURE: 122 MMHG | DIASTOLIC BLOOD PRESSURE: 78 MMHG | BODY MASS INDEX: 25.73 KG/M2 | OXYGEN SATURATION: 98 %

## 2022-01-07 DIAGNOSIS — L73.9 FOLLICULITIS: Primary | ICD-10-CM

## 2022-01-07 PROCEDURE — 99213 OFFICE O/P EST LOW 20 MIN: CPT | Performed by: FAMILY MEDICINE

## 2022-01-07 RX ORDER — CEPHALEXIN 500 MG/1
500 CAPSULE ORAL 3 TIMES DAILY
Qty: 30 CAPSULE | Refills: 0 | Status: SHIPPED | OUTPATIENT
Start: 2022-01-07 | End: 2022-01-17

## 2022-01-07 ASSESSMENT — ANXIETY QUESTIONNAIRES
4. TROUBLE RELAXING: NOT AT ALL
2. NOT BEING ABLE TO STOP OR CONTROL WORRYING: NOT AT ALL
GAD7 TOTAL SCORE: 0
GAD7 TOTAL SCORE: 0
5. BEING SO RESTLESS THAT IT IS HARD TO SIT STILL: NOT AT ALL
3. WORRYING TOO MUCH ABOUT DIFFERENT THINGS: NOT AT ALL
7. FEELING AFRAID AS IF SOMETHING AWFUL MIGHT HAPPEN: NOT AT ALL
6. BECOMING EASILY ANNOYED OR IRRITABLE: NOT AT ALL
7. FEELING AFRAID AS IF SOMETHING AWFUL MIGHT HAPPEN: NOT AT ALL
1. FEELING NERVOUS, ANXIOUS, OR ON EDGE: NOT AT ALL
GAD7 TOTAL SCORE: 0

## 2022-01-07 ASSESSMENT — PAIN SCALES - GENERAL: PAINLEVEL: NO PAIN (0)

## 2022-01-07 ASSESSMENT — PATIENT HEALTH QUESTIONNAIRE - PHQ9
SUM OF ALL RESPONSES TO PHQ QUESTIONS 1-9: 0
10. IF YOU CHECKED OFF ANY PROBLEMS, HOW DIFFICULT HAVE THESE PROBLEMS MADE IT FOR YOU TO DO YOUR WORK, TAKE CARE OF THINGS AT HOME, OR GET ALONG WITH OTHER PEOPLE: NOT DIFFICULT AT ALL
SUM OF ALL RESPONSES TO PHQ QUESTIONS 1-9: 0

## 2022-01-07 ASSESSMENT — MIFFLIN-ST. JEOR: SCORE: 1790.86

## 2022-01-07 NOTE — PATIENT INSTRUCTIONS
Patient Education     Folliculitis  Folliculitis is an infection of a hair follicle. A hair follicle is the little pocket where a hair grows out of the skin. Bacteria normally live on the skin. But sometimes bacteria can get trapped in a follicle and cause infection. This causes a bumpy rash. The area over the follicles is red and raised. It may itch or be painful. The bumps may have fluid (pus) inside. The pus may leak and then form crusts. Sores can spread to other areas of the body. Once it goes away, folliculitis can come back at any time. Severe cases may cause lasting (permanent) hair loss and scarring.   Folliculitis can happen anywhere on the body where hair grows. It can be caused by rubbing from tight clothing. Ingrown hairs can cause it. Soaking in a hot tub or swimming pool that has bacteria in the water can cause it. It may also occur if a hair follicle is blocked by a bandage.   Sores often go away in a few days with no treatment. In some cases, medicine may be given. A small piece of skin or pus may be taken to find the type of bacteria causing the infection.   Home care  The healthcare provider may prescribe an antibiotic cream or ointment. Antibiotics taken by mouth (oral) may also be prescribed. Or you may be told to use an over-the-counter antibiotic cream. Follow all instructions when using any of these medicines.   General care    Apply warm, moist compresses to the sores for 20 minutes up to 3 times a day. You can make a compress by soaking a cloth in warm water. Squeeze out excess water.    Don t cut, poke, or squeeze the sores. This can be painful and spread infection.    Don t scratch the affected area. Scratching can delay healing.    Don t shave the areas affected by folliculitis.    If the sores leak fluid, cover the area with a nonstick gauze bandage. Use as little tape as possible. Carefully get rid of all soiled bandages.    Dress in loose cotton clothing.    Change sheets and  blankets if they are soiled by pus. Wash all clothes, towels, sheets, and cloth diapers in soap and hot water. Don't share clothes, towels, or sheets with other family members.    Don't soak the sores in bath water. This can spread infection. Instead keep the area clean by gently washing sores with soap and warm water.    Wash your hands or use antibacterial gels often to prevent spreading the bacteria.    Follow-up care  Follow up with your healthcare provider, or as advised.  When to get medical advice  Call your healthcare provider right away if any of these occur:    Fever of 100.4 F (38 C) or higher, or as directed by your provider    Rash spreads    Rash does not get better with treatment    Redness or swelling that gets worse    Rash becomes more painful    Foul-smelling fluid leaking from the skin    Rash improves, but then comes back   Katey last reviewed this educational content on 8/1/2019 2000-2021 [employer]. All rights reserved. This information is not intended as a substitute for professional medical care. Always follow your healthcare professional's instructions.           Patient Education     Understanding Folliculitis  Folliculitis is when hair follicles become inflamed. Follicles are the tiny holes from which hair grows out of your skin. This skin condition can occur any place on the body where hair grows. But it s often found on the neck, face, and scalp.    How to say it  [id number]  What causes folliculitis?  An infection or irritation can cause this skin condition. Infectious folliculitis is usually caused by Staph aureus. But it may also be caused by other bacteria or fungi. The condition can also happen from a wound or irritation of the skin. Shaving is a common cause. Some cases may come from taking certain medicines, such as those that treat acne.   Symptoms of folliculitis  This skin condition tends to develop quickly. It looks like little pimples on a base  of a red, inflamed hair follicles. These bumps may ooze pus. They may also be:     Itchy    Painful    Red    Swollen  Treatment for folliculitis  Treatment depends on the cause of the inflammation. In some cases, this skin condition will go away on its own in a few days. But it may return. Treatment options include:     Warm compress. Putting a warm, wet washcloth on the inflamed skin may help. Don't reuse the same washcloth, because that may spread infection.    Medicine. Many skin (topical) and oral medicines are available. Antibiotics are used for bacterial infections. Antifungal medicines are best for fungal infections.    Good hygiene. Keeping the skin clean can help. Use a clean razor when shaving. Prevent ingrown hairs after shaving. This can reduce folliculitis in the beard area. Avoid any substances that bother your skin.  When to call your healthcare provider  Call your healthcare provider right away if you have any of these:    Fever of 100.4 F (38 C) or higher, or as directed by your healthcare provider    Pain that gets worse    Symptoms that don t get better, or get worse    New symptoms  Katey last reviewed this educational content on 6/1/2019 2000-2021 [employer]. All rights reserved. This information is not intended as a substitute for professional medical care. Always follow your healthcare professional's instructions.

## 2022-01-08 ASSESSMENT — PATIENT HEALTH QUESTIONNAIRE - PHQ9: SUM OF ALL RESPONSES TO PHQ QUESTIONS 1-9: 0

## 2022-01-08 ASSESSMENT — ANXIETY QUESTIONNAIRES: GAD7 TOTAL SCORE: 0

## 2022-01-28 ENCOUNTER — OFFICE VISIT (OUTPATIENT)
Dept: ADDICTION MEDICINE | Facility: CLINIC | Age: 42
End: 2022-01-28
Payer: COMMERCIAL

## 2022-01-28 ENCOUNTER — LAB (OUTPATIENT)
Dept: LAB | Facility: CLINIC | Age: 42
End: 2022-01-28

## 2022-01-28 VITALS
SYSTOLIC BLOOD PRESSURE: 120 MMHG | DIASTOLIC BLOOD PRESSURE: 48 MMHG | WEIGHT: 195 LBS | BODY MASS INDEX: 26.63 KG/M2 | HEART RATE: 74 BPM | OXYGEN SATURATION: 98 %

## 2022-01-28 DIAGNOSIS — F32.5 MAJOR DEPRESSIVE DISORDER IN FULL REMISSION, UNSPECIFIED WHETHER RECURRENT (H): ICD-10-CM

## 2022-01-28 DIAGNOSIS — F11.20 OPIOID USE DISORDER, SEVERE, DEPENDENCE (H): ICD-10-CM

## 2022-01-28 DIAGNOSIS — F11.20 OPIOID USE DISORDER, SEVERE, DEPENDENCE (H): Primary | ICD-10-CM

## 2022-01-28 LAB
AMPHETAMINES UR QL: NOT DETECTED
BARBITURATES UR QL SCN: NOT DETECTED
BENZODIAZ UR QL SCN: NOT DETECTED
BUPRENORPHINE UR QL: DETECTED
CANNABINOIDS UR QL: DETECTED
COCAINE UR QL SCN: NOT DETECTED
D-METHAMPHET UR QL: NOT DETECTED
METHADONE UR QL SCN: NOT DETECTED
OPIATES UR QL SCN: NOT DETECTED
OXYCODONE UR QL SCN: NOT DETECTED
PCP UR QL SCN: NOT DETECTED
PROPOXYPH UR QL: NOT DETECTED
TRICYCLICS UR QL SCN: NOT DETECTED

## 2022-01-28 PROCEDURE — 99214 OFFICE O/P EST MOD 30 MIN: CPT | Performed by: FAMILY MEDICINE

## 2022-01-28 PROCEDURE — 80306 DRUG TEST PRSMV INSTRMNT: CPT

## 2022-01-28 RX ORDER — BUPRENORPHINE AND NALOXONE 8; 2 MG/1; MG/1
FILM, SOLUBLE BUCCAL; SUBLINGUAL
Qty: 60 FILM | Refills: 1 | Status: SHIPPED | OUTPATIENT
Start: 2022-01-28 | End: 2022-03-25

## 2022-01-28 ASSESSMENT — ANXIETY QUESTIONNAIRES
3. WORRYING TOO MUCH ABOUT DIFFERENT THINGS: NOT AT ALL
7. FEELING AFRAID AS IF SOMETHING AWFUL MIGHT HAPPEN: NOT AT ALL
GAD7 TOTAL SCORE: 0
4. TROUBLE RELAXING: NOT AT ALL
6. BECOMING EASILY ANNOYED OR IRRITABLE: NOT AT ALL
GAD7 TOTAL SCORE: 0
5. BEING SO RESTLESS THAT IT IS HARD TO SIT STILL: NOT AT ALL
7. FEELING AFRAID AS IF SOMETHING AWFUL MIGHT HAPPEN: NOT AT ALL
2. NOT BEING ABLE TO STOP OR CONTROL WORRYING: NOT AT ALL
1. FEELING NERVOUS, ANXIOUS, OR ON EDGE: NOT AT ALL
GAD7 TOTAL SCORE: 0
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
## 2022-01-29 ASSESSMENT — ANXIETY QUESTIONNAIRES: GAD7 TOTAL SCORE: 0

## 2022-01-29 ASSESSMENT — PATIENT HEALTH QUESTIONNAIRE - PHQ9: SUM OF ALL RESPONSES TO PHQ QUESTIONS 1-9: 0

## 2022-02-14 DIAGNOSIS — M47.816 PRIMARY OSTEOARTHRITIS OF LUMBAR SPINE: ICD-10-CM

## 2022-02-15 ENCOUNTER — TELEPHONE (OUTPATIENT)
Dept: BEHAVIORAL HEALTH | Facility: CLINIC | Age: 42
End: 2022-02-15
Payer: COMMERCIAL

## 2022-02-15 DIAGNOSIS — F17.200 TOBACCO DEPENDENCE: ICD-10-CM

## 2022-02-15 RX ORDER — GABAPENTIN 300 MG/1
CAPSULE ORAL
Qty: 180 CAPSULE | Refills: 0 | Status: SHIPPED | OUTPATIENT
Start: 2022-02-15 | End: 2022-03-01

## 2022-03-01 ENCOUNTER — VIRTUAL VISIT (OUTPATIENT)
Dept: FAMILY MEDICINE | Facility: CLINIC | Age: 42
End: 2022-03-01
Payer: COMMERCIAL

## 2022-03-01 DIAGNOSIS — M47.816 PRIMARY OSTEOARTHRITIS OF LUMBAR SPINE: ICD-10-CM

## 2022-03-01 PROCEDURE — 99213 OFFICE O/P EST LOW 20 MIN: CPT | Mod: 95 | Performed by: FAMILY MEDICINE

## 2022-03-01 RX ORDER — GABAPENTIN 300 MG/1
CAPSULE ORAL
Qty: 540 CAPSULE | Refills: 3 | Status: SHIPPED | OUTPATIENT
Start: 2022-03-01 | End: 2023-02-16

## 2022-03-01 NOTE — PROGRESS NOTES
"James is a 41 year old who is being evaluated via a billable video visit.      How would you like to obtain your AVS? MyChart  If the video visit is dropped, the invitation should be resent by: Text to cell phone: [phone]  Will anyone else be joining your video visit? No    Video Start Time: 521 pm    Assessment & Plan     Primary osteoarthritis of lumbar spine  Doing well, refilled medications  Continue to take 2 tab, 600 mg, tid  - gabapentin (NEURONTIN) 300 MG capsule; 2 tab tid ( 600 mg ) increased dose.    BMI:   Estimated body mass index is 26.63 kg/m  as calculated from the following:    Height as of 1/7/22: 1.822 m (5' 11.75\").    Weight as of 1/28/22: 88.5 kg (195 lb).   Weight management plan: Discussed healthy diet and exercise guidelines    Work on weight loss  Regular exercise    No follow-ups on file.    Charlee Hayes MD  [facility]    Subjective   James is a 41 year old who presents for the following health issues:  History of chronic low back pain, continue to use gabapentin, 2 tablet 3 times daily.  In addition he does use Suboxone.  No side effect from the medication.  No depression, no suicidal thoughts or ideation.  No alcohol abuse or drugs      HPI     Medication Followup of Gabapentin    Taking Medication as prescribed: yes    Side Effects:  None    Medication Helping Symptoms:  yes       Review of Systems   Constitutional, HEENT, cardiovascular, pulmonary, GI, , musculoskeletal, neuro, skin, endocrine and psych systems are negative, except as otherwise noted.      Objective           Vitals:  No vitals were obtained today due to virtual visit.    Physical Exam   GENERAL: Healthy, alert and no distress  EYES: Eyes grossly normal to inspection.  No discharge or erythema, or obvious scleral/conjunctival abnormalities.  RESP: No audible wheeze, cough, or visible cyanosis.  No visible retractions or increased work of breathing.    SKIN: Visible skin clear. No " significant rash, abnormal pigmentation or lesions.  NEURO: Cranial nerves grossly intact.  Mentation and speech appropriate for age.  PSYCH: Mentation appears normal, affect normal/bright, judgement and insight intact, normal speech and appearance well-groomed.      Charlee Hayes MD        Video-Visit Details    Type of service:  Video Visit    Video End Time:5:27    Originating Location (pt. Location): Home    Distant Location (provider location):  [facility]     Platform used for Video Visit: [employer]

## 2022-03-25 ENCOUNTER — VIRTUAL VISIT (OUTPATIENT)
Dept: ADDICTION MEDICINE | Facility: CLINIC | Age: 42
End: 2022-03-25
Payer: COMMERCIAL

## 2022-03-25 DIAGNOSIS — F32.5 MAJOR DEPRESSIVE DISORDER IN FULL REMISSION, UNSPECIFIED WHETHER RECURRENT (H): ICD-10-CM

## 2022-03-25 DIAGNOSIS — F17.200 TOBACCO DEPENDENCE: ICD-10-CM

## 2022-03-25 DIAGNOSIS — F11.20 OPIOID USE DISORDER, SEVERE, DEPENDENCE (H): Primary | ICD-10-CM

## 2022-03-25 PROCEDURE — 99214 OFFICE O/P EST MOD 30 MIN: CPT | Mod: 95 | Performed by: FAMILY MEDICINE

## 2022-03-25 RX ORDER — BUPRENORPHINE AND NALOXONE 8; 2 MG/1; MG/1
FILM, SOLUBLE BUCCAL; SUBLINGUAL
Qty: 60 FILM | Refills: 1 | Status: SHIPPED | OUTPATIENT
Start: 2022-03-25 | End: 2022-05-19

## 2022-03-25 NOTE — PROGRESS NOTES
[facility]    A/P                                                    ASSESSMENT/PLAN  Diagnoses and all orders for this visit:  Opioid use disorder, severe, dependence (H)  -     Adult Mental Health  Referral; Future  -     buprenorphine HCl-naloxone HCl (SUBOXONE) 8-2 MG per film; 1/2 film four times a day  Major depressive disorder in full remission, unspecified whether recurrent (H)  -     Adult Mental Health  Referral; Future  Tobacco dependence    Orders Placed This Encounter   Medications     buprenorphine HCl-naloxone HCl (SUBOXONE) 8-2 MG per film     Si/2 film four times a day     Dispense:  60 Film     Refill:  1     NADEAN: [id number]; Please substitute for covered alternative per insurance request.       Problem list updated Mar 25, 2022   No problems updated.      Mar 25, 2022  - Conitnue suboxone, no changes  - Next appt may be 1-2 days after supply runs out; OK to send refill week of appt, before he comes in  - Working through a difficult court issue, custody goodson. Will be resolved by mid-May. Looking for counseling support in the meantime and ongoing  - Plans to seek out recovery support with AA as well  - Continues using nicotine lozenges, has refills available      Last encounter A/P  2022  - Continue suboxone, QID dosing helps with chronc back pain  - NO cravings, no substance use  - Mood positive, life treating him well      PDMP Review       Value Time User    State PDMP site checked  Yes 3/25/2022 11:23 AM Rober Mauricio MD            RTC  Return in 9 weeks (on 2022) for 1pm, in person.      Counseled the patient on the importance of having a recovery program in addition to medication to manage recovery.  Components include avoiding isolating, having willingness to change, avoiding triggers and managing cravings. Encouraged having some type of sober network and practicing honesty with trusted support person(s). Encouraged  other services such as counseling, 12 step or other self-help organizations.      Opioid warning reviewed.  Risk of overdose following a period of abstinence due to decrease tolerance was discussed including risk of death.  Strongly recommended abstain from alcohol, benzodiazepines, THC, opioids and other drugs of abuse.  Increased risk of return to opioid use after use of these substances discussed.  Increased risk of overdose/death with use of other substances particularly benzodiazepines/alcohol reviewed.        SUBJECTIVE                                                    Edgar Williamson is a 41 year old male who presents to clinic today for follow up    Visit performed Virtual, via video    Video-Visit Details    Type of service:  Video Visit    Video Start Time: 11:10 AM  Video End Time: 11:26 AM    Originating Location (pt. Location): Home    Distant Location (provider location):  [facility]     Platform used for Video Visit: Jasmine        PHQ-9 Score:   PHQ 1/7/2022 1/28/2022 3/25/2022   PHQ-9 Total Score 0 0 0   Q9: Thoughts of better off dead/self-harm past 2 weeks Not at all Not at all Not at all       JULIETH-7 Score:  JULIETH-7 SCORE 11/30/2021 1/7/2022 1/28/2022   Total Score - - -   Total Score 0 (minimal anxiety) 0 (minimal anxiety) 0 (minimal anxiety)   Total Score 0 0 0           Recent HPI Details:  HPI Aug 12, 2021  - Increased suboxone dose to QID, same strength 4-1mg, total dose 16mg daily  - This has made a huge difference in his pain experience; increased activity daily as he is spending more time with his son - he recently regained custody and is very happy with life  - Struggling to quit tobacco entirely; using lozenges  - No craving, no substance use  HPI Oct 8, 2021  - James continues to do well despite recent stressors related to housing instability. He recently was evicted from his apartment after conflict with his land-lord, who stole James's medications. James was able to  "navigate resources through sober housing and find new living situation that can facilitate his son staying there as well. Recent issues/interpersonal-conflict with his son's mother who is upset with recent custody decision. While stressful, James was able to sustain complete abstinence from alcohol and opiates. He did increase cigarette smoking briefly to a pack per day though has since returned to improved cessation back to about a pack per week with lozenge usage.   - Cravings absent, last visit increased suboxone dose to QID (now 4-1mg QID; total dose 16 mg daily). Chronic pain also much improved and patient has noted increased activity/quality-of-life particularly \"keeping up with\" his son.   - Smoking cessation as noted above; improved after brief relapse to increase cigarette use.   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  - Uses his tools to overcome any disrupting factors in his life  - Son doing well  - chronic pain stable  - Continues to smoke about a pack a week, using NRT lozenges  - No cravings, substance use besides CBD  - Moving into a private living space in the lower level with his son  HPI Jan 28, 2022  - No cravings, no substance use  - No depression, mood positive, family well  - Continues to smoke      TODAY'S VISIT  HPI Mar 25, 2022  - Struggling with his ex-wife's divorce proceedings. 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 The next day he got a new job with " better pay and unlimited overtime  - He also moved to a better place, closer to his son's school  - New job allows him to pay  fees as well as new home  - Working hard to keep his mental wellness      OBJECTIVE                                                    PHYSICAL EXAM:  There were no vitals taken for this visit.    GENERAL: healthy, alert and no distress  EYES: Eyes grossly normal to inspection, PERRL and conjunctivae and sclerae normal  RESP: No respiratory distress  MENTAL STATUS EXAM  Appearance/Behavior: No appearant distress  Speech: Normal  Mood/Affect: normal affect  Insight: Adequate    LAB  No results found for any visits on 03/25/22.      HISTORY                                                    Problem list reviewed & adjusted, as indicated.  Patient Active Problem List   Diagnosis     CARDIOVASCULAR SCREENING; LDL GOAL LESS THAN 160     Moderate major depression (H)     Generalized anxiety disorder     Benign meningioma of brain (H)     Primary osteoarthritis of lumbar spine     Strain of lumbar region, initial encounter     Trichiasis of right lower eyelid     Stasis dermatitis of both legs     Seasonal allergies     Poisoning by benzodiazepines, undetermined, initial encounter     Nodule of right lung     Lower urinary tract infectious disease     Leukocytosis     Kidney stone     Eyelid laceration, right, initial encounter     Alcohol abuse     Controlled substance agreement signed     History of hepatitis C virus infection     Opioid use disorder, severe, dependence (H) with chronic pain     Tobacco dependence     High risk medication use         MEDICATION LIST (prior to visit)  gabapentin (NEURONTIN) 300 MG capsule, 2 tab tid ( 600 mg ) increased dose.  ibuprofen (ADVIL/MOTRIN) 800 MG tablet, Take 1 tablet (800 mg) by mouth every 8 hours as needed for moderate pain  Lidocaine (LIDOCARE) 4 % Patch, Place 1 patch onto the skin every 24 hours To prevent lidocaine toxicity, patient  should be patch free for 12 hrs daily.  nicotine (NICORETTE) 4 MG lozenge, Place 1 lozenge (4 mg) inside cheek every hour as needed for smoking cessation  naloxone (NARCAN) 4 MG/0.1ML nasal spray, 1 spray into nostril for Opioid emergency every 2-3 minutes until assistance arrives (Patient not taking: Reported on 3/1/2022)    No current facility-administered medications on file prior to visit.      MEDICATION LIST (after visit)  Current Outpatient Medications   Medication     buprenorphine HCl-naloxone HCl (SUBOXONE) 8-2 MG per film     gabapentin (NEURONTIN) 300 MG capsule     ibuprofen (ADVIL/MOTRIN) 800 MG tablet     Lidocaine (LIDOCARE) 4 % Patch     nicotine (NICORETTE) 4 MG lozenge     naloxone (NARCAN) 4 MG/0.1ML nasal spray     No current facility-administered medications for this visit.         Allergies   Allergen Reactions     Diphenhydramine Other (See Comments)     Restless Legs/Feet  Other reaction(s): Restless Legs/Feet  Restless legs  Other reaction(s): Restless Legs/Feet       Methadone Other (See Comments)     Had terrible itching and redness and was pulled off methadone     Mirtazapine Other (See Comments)     Restless Legs/Feet  Other reaction(s): Restless Legs/Feet  Restless legs.  Other reaction(s): Restless Legs/Feet       Seasonal Allergies      Trazodone Other (See Comments) and Unknown     Behavioral Disturbances  Other reaction(s): Behavioral Disturbances  Behavorial disturbance.  - restless legs         Additional MDM Details:  none    Rober Mauricio MD  [facility]  [phone]

## 2022-03-25 NOTE — PROGRESS NOTES
James is a 41 year old who is being evaluated via a billable video visit.      How would you like to obtain your AVS? [employer]  If the video visit is dropped, the invitation should be resent by: Text to cell phone: [phone]  Will anyone else be joining your video visit? No

## 2022-03-26 ASSESSMENT — PATIENT HEALTH QUESTIONNAIRE - PHQ9: SUM OF ALL RESPONSES TO PHQ QUESTIONS 1-9: 0

## 2022-04-17 ENCOUNTER — HEALTH MAINTENANCE LETTER (OUTPATIENT)
Age: 42
End: 2022-04-17

## 2022-05-27 ENCOUNTER — LAB (OUTPATIENT)
Dept: LAB | Facility: CLINIC | Age: 42
End: 2022-05-27

## 2022-05-27 ENCOUNTER — OFFICE VISIT (OUTPATIENT)
Dept: ADDICTION MEDICINE | Facility: CLINIC | Age: 42
End: 2022-05-27
Payer: COMMERCIAL

## 2022-05-27 VITALS — DIASTOLIC BLOOD PRESSURE: 69 MMHG | HEART RATE: 67 BPM | SYSTOLIC BLOOD PRESSURE: 128 MMHG

## 2022-05-27 DIAGNOSIS — F11.20 OPIOID USE DISORDER, SEVERE, DEPENDENCE (H): ICD-10-CM

## 2022-05-27 DIAGNOSIS — F17.200 TOBACCO DEPENDENCE: ICD-10-CM

## 2022-05-27 DIAGNOSIS — F11.20 OPIOID USE DISORDER, SEVERE, DEPENDENCE (H): Primary | ICD-10-CM

## 2022-05-27 DIAGNOSIS — Z13.220 SCREENING FOR HYPERLIPIDEMIA: Primary | ICD-10-CM

## 2022-05-27 PROCEDURE — 99214 OFFICE O/P EST MOD 30 MIN: CPT | Performed by: FAMILY MEDICINE

## 2022-05-27 PROCEDURE — 80306 DRUG TEST PRSMV INSTRMNT: CPT

## 2022-05-27 RX ORDER — BUPRENORPHINE AND NALOXONE 8; 2 MG/1; MG/1
FILM, SOLUBLE BUCCAL; SUBLINGUAL
Qty: 75 FILM | Refills: 1 | Status: SHIPPED | OUTPATIENT
Start: 2022-05-27 | End: 2022-08-12

## 2022-05-27 NOTE — PROGRESS NOTES
[facility]    A/P                                                    ASSESSMENT/PLAN  Diagnoses and all orders for this visit:  Opioid use disorder, severe, dependence (H)  -     Urine Drugs of Abuse Screen Panel 13; Future  -     Urine Drugs of Abuse Screen Panel 13; Future  -     buprenorphine HCl-naloxone HCl (SUBOXONE) 8-2 MG per film; 1/2 film four times a day, plus 1/2 film PRN for back spasms. Not to exceed 20mg per day (2.5 films)  Tobacco dependence  -     nicotine (NICORETTE) 4 MG lozenge; Place 1 lozenge (4 mg) inside cheek every hour as needed for smoking cessation    Orders Placed This Encounter   Medications     buprenorphine HCl-naloxone HCl (SUBOXONE) 8-2 MG per film     Si/2 film four times a day, plus 1/2 film PRN for back spasms. Not to exceed 20mg per day (2.5 films)     Dispense:  75 Film     Refill:  1     NADEAN: [id number]; Please substitute for covered alternative per insurance request.     nicotine (NICORETTE) 4 MG lozenge     Sig: Place 1 lozenge (4 mg) inside cheek every hour as needed for smoking cessation     Dispense:  168 lozenge     Refill:  3       Problem list updated May 27, 2022   No problems updated.      May 27, 2022  - Recently increased suboxone PRN for back spasms. Working with chiropracter, physical therapy, and provided some exercise suggestions. Plan to return to #60 monthly at next visit  - Continues to work on smoking cessation; not entirely quit but lozenges helping  - Court concerns have resolved and improved  - Has been setup with counseling and found it helpful, will f/up intermittently      Last encounter A/P  Mar 25, 2022  - Conitnue suboxone, no changes  - Next appt may be 1-2 days after supply runs out; OK to send refill week of appt, before he comes in  - Working through a difficult court issue, custody goodson. Will be resolved by mid-May. Looking for counseling support in the meantime and ongoing  - Plans to seek out recovery  support with AA as well  - Continues using nicotine lozenges, has refills available      PDMP Review       Value Time User    State PDMP site checked  Yes 5/27/2022  1:24 PM Rober Mauricio MD            RTC  Return in about 3 months (around 8/27/2022) for video visit.      Counseled the patient on the importance of having a recovery program in addition to medication to manage recovery.  Components include avoiding isolating, having willingness to change, avoiding triggers and managing cravings. Encouraged having some type of sober network and practicing honesty with trusted support person(s). Encouraged other services such as counseling, 12 step or other self-help organizations.      Opioid warning reviewed.  Risk of overdose following a period of abstinence due to decrease tolerance was discussed including risk of death.  Strongly recommended abstain from alcohol, benzodiazepines, THC, opioids and other drugs of abuse.  Increased risk of return to opioid use after use of these substances discussed.  Increased risk of overdose/death with use of other substances particularly benzodiazepines/alcohol reviewed.        SUBJECTIVE                                                    Edgar Williamson is a 42 year old male who presents to clinic today for follow up    Visit performed In Person, face-to-face      Rooming information:  Any substance use since last appt?: No  Side effects related to medication for substance use (constipation, dry mouth, sedation?) Yes: constipation and dry mouth  Narcan currently available: Yes  Other recent substance use:     Denies   NICOTINE-Yes:   If using nicotine, ready to quit? No    PHQ-9 Score:   PHQ 1/7/2022 1/28/2022 3/25/2022   PHQ-9 Total Score 0 0 0   Q9: Thoughts of better off dead/self-harm past 2 weeks Not at all Not at all Not at all       JULIETH-7 Score:  JULIETH-7 SCORE 11/30/2021 1/7/2022 1/28/2022   Total Score - - -   Total Score 0 (minimal anxiety) 0 (minimal anxiety) 0  "(minimal anxiety)   Total Score 0 0 0         Primary care provider: Charlee Hayes MD     Contact information up to date? yes    3rd Party Involvement not at this time (please obtain JARET if pt would like to include)    Tram Conner MA  May 27, 2022  12:51 PM        Recent HPI Details:  HPI Nov 30, 2021  - Doing well, no concerns, mood stable  - May get promoted to \"being the boss\" soon  - Uses his tools to overcome any disrupting factors in his life  - Son doing well  - chronic pain stable  - Continues to smoke about a pack a week, using NRT lozenges  - No cravings, substance use besides CBD  - Moving into a private living space in the lower level with his son  HPI Jan 28, 2022  - No cravings, no substance use  - No depression, mood positive, family well  - Continues to smoke  HPI Mar 25, 2022  - Struggling with his ex-wife's divorce proceedings. 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 The next day he got a new job with better pay and unlimited overtime  - He also moved to a better place, closer to his son's school  - New job allows him to pay  fees as well as new home  - Working hard to keep his mental wellness         TODAY'S VISIT  HPI May 27, 2022  - Has been in significant pain recently from a back spasm that hasn't let go. Roughly the past 2 weeks  - Has used 1/2-1 extra film 1-2 days per week when pain is severe; refilled a few days early per phone note earlier this week  - Getting more custody of his son after a recent court hearing  - Will visit his grandfather's grave this weekend; he was a huge inspiration for recovery for " James  - Mood positive, no concerns  - Enjoying his new job  - Got setup with counseling, 2 sessions so far, plans to continue intermittently      OBJECTIVE                                                    PHYSICAL EXAM:  /69   Pulse 67     GENERAL: healthy, alert and no distress  EYES: Eyes grossly normal to inspection, PERRL and conjunctivae and sclerae normal  RESP: No respiratory distress  MENTAL STATUS EXAM  Appearance/Behavior: No appearant distress  Speech: Normal  Mood/Affect: normal affect  Insight: Adequate    LAB  No results found for any visits on 05/27/22.      HISTORY                                                    Problem list reviewed & adjusted, as indicated.  Patient Active Problem List   Diagnosis     CARDIOVASCULAR SCREENING; LDL GOAL LESS THAN 160     Moderate major depression (H)     Generalized anxiety disorder     Benign meningioma of brain (H)     Primary osteoarthritis of lumbar spine     Strain of lumbar region, initial encounter     Trichiasis of right lower eyelid     Stasis dermatitis of both legs     Seasonal allergies     Poisoning by benzodiazepines, undetermined, initial encounter     Nodule of right lung     Lower urinary tract infectious disease     Leukocytosis     Kidney stone     Eyelid laceration, right, initial encounter     Alcohol abuse     Controlled substance agreement signed     History of hepatitis C virus infection     Opioid use disorder, severe, dependence (H) with chronic pain     Tobacco dependence     High risk medication use         MEDICATION LIST (prior to visit)  gabapentin (NEURONTIN) 300 MG capsule, 2 tab tid ( 600 mg ) increased dose.  ibuprofen (ADVIL/MOTRIN) 800 MG tablet, Take 1 tablet (800 mg) by mouth every 8 hours as needed for moderate pain  Lidocaine (LIDOCARE) 4 % Patch, Place 1 patch onto the skin every 24 hours To prevent lidocaine toxicity, patient should be patch free for 12 hrs daily.    No current facility-administered medications  on file prior to visit.      MEDICATION LIST (after visit)  Current Outpatient Medications   Medication     buprenorphine HCl-naloxone HCl (SUBOXONE) 8-2 MG per film     gabapentin (NEURONTIN) 300 MG capsule     ibuprofen (ADVIL/MOTRIN) 800 MG tablet     Lidocaine (LIDOCARE) 4 % Patch     nicotine (NICORETTE) 4 MG lozenge     No current facility-administered medications for this visit.         Allergies   Allergen Reactions     Diphenhydramine Other (See Comments)     Restless Legs/Feet  Other reaction(s): Restless Legs/Feet  Restless legs  Other reaction(s): Restless Legs/Feet       Methadone Other (See Comments)     Had terrible itching and redness and was pulled off methadone     Mirtazapine Other (See Comments)     Restless Legs/Feet  Other reaction(s): Restless Legs/Feet  Restless legs.  Other reaction(s): Restless Legs/Feet       Seasonal Allergies      Trazodone Other (See Comments) and Unknown     Behavioral Disturbances  Other reaction(s): Behavioral Disturbances  Behavorial disturbance.  - restless legs         Additional MDM Details:  none    Rober Mauricio MD  [facility]  [phone]

## 2022-08-11 NOTE — PATIENT INSTRUCTIONS
Introduction to [facility]  Things You Need to Know      ? Provide at least 24 hour notice if you are not able to attend your appointment.  Call the clinic if you are running late.  If you arrive more than 15 minutes late you will be asked to provide a urine sample and it will be determined at that time if you can be seen or asked to reschedule.      ? If you miss your scheduled appointment you will need to call the clinic to be rescheduled for the next available appointment.       ? Any medications will require frequent follow-up appointments initially.  You may be required to be seen more often depending on your progress.      ? You are required to provide a urine sample before every appointment.  You will not be roomed to see the provider until this is completed.  Please let us know of any non-prescription substance(s) that may appear in your urine drug test.     ? Please allow at least 24 business hours to process medication refill requests.  If possible notify the clinic staff 2-3 days before running out of medications.     ? If there is a concern with a medication request we will contact you.  Otherwise, please contact your pharmacy directly to see if your prescription is ready for .  If you receive a call from our clinic please return the call promptly as your prescription may be delayed until we hear from you.     ? It is your responsibility to keep your medication in a safe place away from pets and children.  Lost or stolen medication(s) are generally not replaced.  If medications are stolen we encourage you to file a police report.  Even if medication is prescribed your insurance may not cover early refills.    ? Please allow at least 72 business hours to complete any forms.  When possible please bring the forms to your scheduled appointments.     ? Make sure our clinic has your contact information with an active phone number so that we can contact you to prevent delays in  you getting medications or lab results.       Clinic Hours  Monday - Friday 8:00am - 5:00pm  Clinic Phone: [phone]    If medical care is needed outside of business hours you will need to contact your primary care provider or go to an urgent care or emergency room.  Buprenorphine will not be prescribed outside of business hours.      No

## 2022-08-12 DIAGNOSIS — F11.20 OPIOID USE DISORDER, SEVERE, DEPENDENCE (H): ICD-10-CM

## 2022-08-12 RX ORDER — BUPRENORPHINE AND NALOXONE 8; 2 MG/1; MG/1
FILM, SOLUBLE BUCCAL; SUBLINGUAL
Qty: 75 FILM | Refills: 0 | Status: SHIPPED | OUTPATIENT
Start: 2022-08-12 | End: 2022-09-13

## 2022-08-12 NOTE — TELEPHONE ENCOUNTER
Date of Last Office Visit: 2022  Date of Next Office Visit: 2022  No shows since last visit: None  Cancellations since last visit: None    Medication requested: Suboxone 8-2 mg film Date last ordered: 2022 Qty: 75 Refills: 1     [place] ?[place]    [place] ?: [place]  If yes, is this a new medication?: No  If yes, name of medication: N/A and date filled: N/A    Lapse in medication adherence greater than 5 days?: No  If yes, call patient and gather details: N/A  Medication refill request verified as identical to current order?: No- bridge  Result of Last DAM, VPA, Li+ Level, CBC, or Carbamazepine Level (at or since last visit): N/A    Last visit treatment plan:   ASSESSMENT/PLAN  Diagnoses and all orders for this visit:  Opioid use disorder, severe, dependence (H)  -     Urine Drugs of Abuse Screen Panel 13; Future  -     Urine Drugs of Abuse Screen Panel 13; Future  -     buprenorphine HCl-naloxone HCl (SUBOXONE) 8-2 MG per film; 1/2 film four times a day, plus 1/2 film PRN for back spasms. Not to exceed 20mg per day (2.5 films)  Tobacco dependence  -     nicotine (NICORETTE) 4 MG lozenge; Place 1 lozenge (4 mg) inside cheek every hour as needed for smoking cessation          Orders Placed This Encounter   Medications     buprenorphine HCl-naloxone HCl (SUBOXONE) 8-2 MG per film       Si/2 film four times a day, plus 1/2 film PRN for back spasms. Not to exceed 20mg per day (2.5 films)       Dispense:  75 Film       Refill:  1       NADEAN: [id number]; Please substitute for covered alternative per insurance request.     nicotine (NICORETTE) 4 MG lozenge       Sig: Place 1 lozenge (4 mg) inside cheek every hour as needed for smoking cessation       Dispense:  168 lozenge       Refill:  3         Problem list updated May 27, 2022   No problems updated.     May 27, 2022  - Recently increased suboxone PRN for back spasms. Working with chiropracter, physical therapy, and  provided some exercise suggestions. Plan to return to #60 monthly at next visit  - Continues to work on smoking cessation; not entirely quit but lozenges helping  - Court concerns have resolved and improved  - Has been setup with counseling and found it helpful, will f/up intermittently     Last encounter A/P  Mar 25, 2022  - Conitnue suboxone, no changes  - Next appt may be 1-2 days after supply runs out; OK to send refill week of appt, before he comes in  - Working through a difficult court issue, custody goodson. Will be resolved by mid-May. Looking for counseling support in the meantime and ongoing  - Plans to seek out recovery support with AA as well  - Continues using nicotine lozenges, has refills available     RTC  Return in about 3 months (around 8/27/2022) for video visit.      []Medication refilled per  Medication Refill in Ambulatory Care  policy.  [x]Medication unable to be refilled by RN due to criteria not met as indicated below:    []Eligibility - not seen in the last year   []Supervision - no future appointment   []Compliance - no shows, cancellations or lapse in therapy   []Verification - order discrepancy   [x]Controlled medication   [x]Medication not included in policy   []90-day supply request   []Other

## 2022-09-13 DIAGNOSIS — F11.20 OPIOID USE DISORDER, SEVERE, DEPENDENCE (H): ICD-10-CM

## 2022-09-13 RX ORDER — BUPRENORPHINE AND NALOXONE 8; 2 MG/1; MG/1
FILM, SOLUBLE BUCCAL; SUBLINGUAL
Qty: 40 FILM | Refills: 0 | Status: SHIPPED | OUTPATIENT
Start: 2022-09-13 | End: 2022-09-28

## 2022-09-13 RX ORDER — BUPRENORPHINE HYDROCHLORIDE, NALOXONE HYDROCHLORIDE 8; 2 MG/1; MG/1
FILM, SOLUBLE BUCCAL; SUBLINGUAL
Qty: 75 FILM | OUTPATIENT
Start: 2022-09-13

## 2022-09-13 NOTE — TELEPHONE ENCOUNTER
Date of Last Office Visit: 2022  Date of Next Office Visit: 2022  No shows since last visit: None  Cancellations since last visit: 2022 (pt initiated)    Medication requested: Suboxone 8-2 mg film Date last ordered: 2022 Qty: 75 Refills: 0     [place]      [place] ?[place]  If yes, is this a new medication?: No  If yes, name of medication: N/A and date filled: N/A    Lapse in medication adherence greater than 5 days?: No  If yes, call patient and gather details: N/A  Medication refill request verified as identical to current order?: No- bridge  Result of Last DAM, VPA, Li+ Level, CBC, or Carbamazepine Level (at or since last visit): N/A    Last visit treatment plan:   ASSESSMENT/PLAN  Diagnoses and all orders for this visit:  Opioid use disorder, severe, dependence (H)  -     Urine Drugs of Abuse Screen Panel 13; Future  -     Urine Drugs of Abuse Screen Panel 13; Future  -     buprenorphine HCl-naloxone HCl (SUBOXONE) 8-2 MG per film; 1/2 film four times a day, plus 1/2 film PRN for back spasms. Not to exceed 20mg per day (2.5 films)  Tobacco dependence  -     nicotine (NICORETTE) 4 MG lozenge; Place 1 lozenge (4 mg) inside cheek every hour as needed for smoking cessation          Orders Placed This Encounter   Medications     buprenorphine HCl-naloxone HCl (SUBOXONE) 8-2 MG per film       Si/2 film four times a day, plus 1/2 film PRN for back spasms. Not to exceed 20mg per day (2.5 films)       Dispense:  75 Film       Refill:  1       NADEAN: [id number]; Please substitute for covered alternative per insurance request.     nicotine (NICORETTE) 4 MG lozenge       Sig: Place 1 lozenge (4 mg) inside cheek every hour as needed for smoking cessation       Dispense:  168 lozenge       Refill:  3         Problem list updated May 27, 2022   No problems updated.      May 27, 2022  - Recently increased suboxone PRN for back spasms. Working with chiropracter,  physical therapy, and provided some exercise suggestions. Plan to return to #60 monthly at next visit  - Continues to work on smoking cessation; not entirely quit but lozenges helping  - Court concerns have resolved and improved  - Has been setup with counseling and found it helpful, will f/up intermittently     Last encounter A/P  Mar 25, 2022  - Conitnue suboxone, no changes  - Next appt may be 1-2 days after supply runs out; OK to send refill week of appt, before he comes in  - Working through a difficult court issue, custody goodson. Will be resolved by mid-May. Looking for counseling support in the meantime and ongoing  - Plans to seek out recovery support with AA as well  - Continues using nicotine lozenges, has refills available               PDMP Review        Value Time User     State PDMP site checked  Yes 5/27/2022  1:24 PM Rober Mauricio MD              RTC  Return in about 3 months (around 8/27/2022) for video visit.      []Medication refilled per  Medication Refill in Ambulatory Care  policy.  [x]Medication unable to be refilled by RN due to criteria not met as indicated below:    []Eligibility - not seen in the last year   []Supervision - no future appointment   [x]Compliance - no shows, cancellations or lapse in therapy   []Verification - order discrepancy   [x]Controlled medication   [x]Medication not included in policy   []90-day supply request   []Other

## 2022-09-13 NOTE — TELEPHONE ENCOUNTER
Reason for call:  Medication   If this is a refill request, has the caller requested the refill from the pharmacy already? Yes  Will the patient be using a [facility]? No  Name of the pharmacy and phone number for the current request: [id number] - [place], [place] - [address]  [phone]    Name of the medication requested: buprenorphine HCl-naloxone HCl (SUBOXONE) 8-2 MG per film    Other request: Patient needs a bridge to get to his next appointment with Dr. Mauricio on 9/28/22.    Phone number to reach patient:  Home number on file [phone] (home)    Best Time:  ASAP    Can we leave a detailed message on this number?  YES    Travel screening: Negative

## 2022-09-28 ENCOUNTER — VIRTUAL VISIT (OUTPATIENT)
Dept: ADDICTION MEDICINE | Facility: CLINIC | Age: 42
End: 2022-09-28
Payer: COMMERCIAL

## 2022-09-28 DIAGNOSIS — F11.20 OPIOID USE DISORDER, SEVERE, DEPENDENCE (H): Primary | ICD-10-CM

## 2022-09-28 DIAGNOSIS — F17.200 TOBACCO DEPENDENCE: ICD-10-CM

## 2022-09-28 DIAGNOSIS — F32.5 MAJOR DEPRESSIVE DISORDER IN FULL REMISSION, UNSPECIFIED WHETHER RECURRENT (H): ICD-10-CM

## 2022-09-28 PROCEDURE — 99214 OFFICE O/P EST MOD 30 MIN: CPT | Mod: 95 | Performed by: FAMILY MEDICINE

## 2022-09-28 RX ORDER — BUPRENORPHINE AND NALOXONE 8; 2 MG/1; MG/1
0.5 FILM, SOLUBLE BUCCAL; SUBLINGUAL 4 TIMES DAILY
Qty: 60 FILM | Refills: 1 | Status: SHIPPED | OUTPATIENT
Start: 2022-09-28 | End: 2022-11-22

## 2022-09-28 ASSESSMENT — PATIENT HEALTH QUESTIONNAIRE - PHQ9
10. IF YOU CHECKED OFF ANY PROBLEMS, HOW DIFFICULT HAVE THESE PROBLEMS MADE IT FOR YOU TO DO YOUR WORK, TAKE CARE OF THINGS AT HOME, OR GET ALONG WITH OTHER PEOPLE: NOT DIFFICULT AT ALL
SUM OF ALL RESPONSES TO PHQ QUESTIONS 1-9: 0
SUM OF ALL RESPONSES TO PHQ QUESTIONS 1-9: 0

## 2022-09-28 NOTE — PROGRESS NOTES
James is a 42 year old who is being evaluated via a billable video visit.      How would you like to obtain your AVS? MyChart  If the video visit is dropped, the invitation should be resent by: Text to cell phone: [phone]  Will anyone else be joining your video visit? No          [facility]    A/P                                                    ASSESSMENT/PLAN  Diagnoses and all orders for this visit:  Opioid use disorder, severe, dependence (H)  -     buprenorphine HCl-naloxone HCl (SUBOXONE) 8-2 MG per film; Place 0.5 Film under the tongue 4 times daily  Tobacco dependence  Major depressive disorder in full remission, unspecified whether recurrent (H)    Orders Placed This Encounter   Medications     buprenorphine HCl-naloxone HCl (SUBOXONE) 8-2 MG per film     Sig: Place 0.5 Film under the tongue 4 times daily     Dispense:  60 Film     Refill:  1     NADEAN: [id number]; Please substitute for covered alternative per insurance request.       Problem list updated Sep 28, 2022   No problems updated.      Sep 28, 2022  - Continue suboxone at QID. Discussed eliminating the extra PRN dose, and he agrees. Will add new Rx with PRN dose if his back pain remains difficult to manage without it  - Continues to do core exercises and other PT to help his back pain  - Stopped smoking!  - Mood positive, no further court/legal issues with custody. Son doing well      Last encounter A/P  May 27, 2022  - Recently increased suboxone PRN for back spasms. Working with chiropracter, physical therapy, and provided some exercise suggestions. Plan to return to #60 monthly at next visit  - Continues to work on smoking cessation; not entirely quit but lozenges helping  - Court concerns have resolved and improved  - Has been setup with counseling and found it helpful, will f/up intermittently      PDMP Review       Value Time User    State PDMP site checked  Yes 9/28/2022  2:37 PM Rober Mauricio MD             RTC  Return in 9 weeks (on 11/30/2022) for in person, 4pm.      Counseled the patient on the importance of having a recovery program in addition to medication to manage recovery.  Components include avoiding isolating, having willingness to change, avoiding triggers and managing cravings. Encouraged having some type of sober network and practicing honesty with trusted support person(s). Encouraged other services such as counseling, 12 step or other self-help organizations.      Opioid warning reviewed.  Risk of overdose following a period of abstinence due to decrease tolerance was discussed including risk of death.  Strongly recommended abstain from alcohol, benzodiazepines, THC, opioids and other drugs of abuse.  Increased risk of return to opioid use after use of these substances discussed.  Increased risk of overdose/death with use of other substances particularly benzodiazepines/alcohol reviewed.        SUBJECTIVE                                                    Edgar Williamson is a 42 year old male who presents to clinic today for follow up    Visit performed Virtual, via video    Video-Visit Details    Type of service:  Video Visit    Video Start Time: 2:33 PM  Video End Time: 2:48 PM    Originating Location (pt. Location): Home    Distant Location (provider location):  [facility]     Platform used for Video Visit: Jasmine        PHQ-9 Score:   PHQ 1/28/2022 3/25/2022 9/28/2022   PHQ-9 Total Score 0 0 0   Q9: Thoughts of better off dead/self-harm past 2 weeks Not at all Not at all Not at all       JULIETH-7 Score:  JULIETH-7 SCORE 11/30/2021 1/7/2022 1/28/2022   Total Score - - -   Total Score 0 (minimal anxiety) 0 (minimal anxiety) 0 (minimal anxiety)   Total Score 0 0 0           Recent HPI Details:  HPI Mar 25, 2022  - Struggling with his ex-wife's divorce proceedings. 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 Roughly the past 2 weeks  - Has used 1/2-1 extra film 1-2 days per week when pain is severe; refilled a few days early per phone note earlier this week  - Getting more custody of his son after a recent court hearing  - Will visit his grandfather's grave this weekend; he was a huge inspiration for recovery for James  - Mood positive, no concerns  - Enjoying his new job  - Got setup with counseling, 2 sessions so far, plans to continue intermittently      TODAY'S VISIT  HPI Sep 28, 2022  - Finally quit smoking!  - Back still in pain most days. Pain improving slowly with daily exercises and improving core strength. Using extra dose of suboxone (1/2 film) about half the time  - Otherwise feeling well, mood stable, life going well  - Court issues were resolved with both him and his ex, and will not have ongoing court involvement  - No substance use, no cravings  - Continues to attend meetings. Supporting others who are struggling, which is affirming and feels nice to be in that position      OBJECTIVE                                                    PHYSICAL EXAM:  There were no vitals taken for this visit.    GENERAL: healthy, alert and no distress  EYES: Eyes grossly normal to inspection, PERRL and conjunctivae and sclerae normal  RESP: No respiratory distress  MENTAL STATUS  EXAM  Appearance/Behavior: No appearant distress  Speech: Normal  Mood/Affect: normal affect  Insight: Adequate    LAB  No results found for any visits on 09/28/22.      HISTORY                                                    Problem list reviewed & adjusted, as indicated.  Patient Active Problem List   Diagnosis     CARDIOVASCULAR SCREENING; LDL GOAL LESS THAN 160     Moderate major depression (H)     Generalized anxiety disorder     Benign meningioma of brain (H)     Primary osteoarthritis of lumbar spine     Strain of lumbar region, initial encounter     Trichiasis of right lower eyelid     Stasis dermatitis of both legs     Seasonal allergies     Poisoning by benzodiazepines, undetermined, initial encounter     Nodule of right lung     Lower urinary tract infectious disease     Leukocytosis     Kidney stone     Eyelid laceration, right, initial encounter     Alcohol abuse     Controlled substance agreement signed     History of hepatitis C virus infection     Opioid use disorder, severe, dependence (H) with chronic pain     Tobacco dependence     High risk medication use         MEDICATION LIST (prior to visit)  gabapentin (NEURONTIN) 300 MG capsule, 2 tab tid ( 600 mg ) increased dose.  ibuprofen (ADVIL/MOTRIN) 800 MG tablet, Take 1 tablet (800 mg) by mouth every 8 hours as needed for moderate pain  Lidocaine (LIDOCARE) 4 % Patch, Place 1 patch onto the skin every 24 hours To prevent lidocaine toxicity, patient should be patch free for 12 hrs daily.  nicotine (NICORETTE) 4 MG lozenge, Place 1 lozenge (4 mg) inside cheek every hour as needed for smoking cessation    No current facility-administered medications on file prior to visit.      MEDICATION LIST (after visit)  Current Outpatient Medications   Medication     buprenorphine HCl-naloxone HCl (SUBOXONE) 8-2 MG per film     gabapentin (NEURONTIN) 300 MG capsule     ibuprofen (ADVIL/MOTRIN) 800 MG tablet     Lidocaine (LIDOCARE) 4 % Patch     nicotine  (NICORETTE) 4 MG lozenge     No current facility-administered medications for this visit.         Allergies   Allergen Reactions     Diphenhydramine Other (See Comments)     Restless Legs/Feet  Other reaction(s): Restless Legs/Feet  Restless legs  Other reaction(s): Restless Legs/Feet       Methadone Other (See Comments)     Had terrible itching and redness and was pulled off methadone     Mirtazapine Other (See Comments)     Restless Legs/Feet  Other reaction(s): Restless Legs/Feet  Restless legs.  Other reaction(s): Restless Legs/Feet       Seasonal Allergies      Trazodone Other (See Comments) and Unknown     Behavioral Disturbances  Other reaction(s): Behavioral Disturbances  Behavorial disturbance.  - restless legs         Additional MDM Details:  none    Rober Mauricio MD  [facility]  [phone]

## 2022-10-23 ENCOUNTER — HEALTH MAINTENANCE LETTER (OUTPATIENT)
Age: 42
End: 2022-10-23

## 2022-11-30 ENCOUNTER — APPOINTMENT (OUTPATIENT)
Dept: LAB | Facility: CLINIC | Age: 42
End: 2022-11-30
Payer: COMMERCIAL

## 2022-11-30 ENCOUNTER — OFFICE VISIT (OUTPATIENT)
Dept: ADDICTION MEDICINE | Facility: CLINIC | Age: 42
End: 2022-11-30
Payer: COMMERCIAL

## 2022-11-30 DIAGNOSIS — F11.20 OPIOID USE DISORDER, SEVERE, DEPENDENCE (H): Primary | ICD-10-CM

## 2022-11-30 DIAGNOSIS — F32.5 MAJOR DEPRESSIVE DISORDER IN FULL REMISSION, UNSPECIFIED WHETHER RECURRENT (H): ICD-10-CM

## 2022-11-30 PROCEDURE — 99214 OFFICE O/P EST MOD 30 MIN: CPT | Performed by: FAMILY MEDICINE

## 2022-11-30 PROCEDURE — 80306 DRUG TEST PRSMV INSTRMNT: CPT | Performed by: FAMILY MEDICINE

## 2022-11-30 RX ORDER — BUPRENORPHINE AND NALOXONE 8; 2 MG/1; MG/1
FILM, SOLUBLE BUCCAL; SUBLINGUAL
Qty: 75 FILM | Refills: 1 | Status: SHIPPED | OUTPATIENT
Start: 2022-11-30 | End: 2023-01-27

## 2022-11-30 NOTE — PROGRESS NOTES
[facility]    A/P                                                    ASSESSMENT/PLAN  Diagnoses and all orders for this visit:  Opioid use disorder, severe, dependence (H)  -     buprenorphine HCl-naloxone HCl (SUBOXONE) 8-2 MG per film; Place 0.5 Film under the tongue 4 times daily. May also place 0.5 Film daily as needed (pain). Not to exceed 20 mg per day  -     Urine Drugs of Abuse Screen Panel 13; Future  Major depressive disorder in full remission, unspecified whether recurrent (H)    Orders Placed This Encounter   Medications     buprenorphine HCl-naloxone HCl (SUBOXONE) 8-2 MG per film     Sig: Place 0.5 Film under the tongue 4 times daily. May also place 0.5 Film daily as needed (pain). Not to exceed 20 mg per day     Dispense:  75 Film     Refill:  1     NADEAN: [id number]; Please substitute for covered alternative per insurance request.       Problem list updated Nov 30, 2022   No problems updated.      Nov 30, 2022  - Stable OUD, no cravings or other substnace use  - Increased buprenorphine again to 5x daily d/t flare of back pain, likely d/t emotionally laden life changes  - Brought [facility] Natividad into appt for warm handoff today, and James will see her for intake next Monday  - Mood otherwise positive  - Attending meetings for support and with his son on the weekends, which is always pleasant and distracting      Last encounter A/P  Sep 28, 2022  - Continue suboxone at [facility]. Discussed eliminating the extra PRN dose, and he agrees. Will add new Rx with PRN dose if his back pain remains difficult to manage without it  - Continues to do core exercises and other PT to help his back pain  - Stopped smoking!  - Mood positive, no further court/legal issues with custody. Son doing well      PDMP Review       Value Time User    State PDMP site checked  Yes 11/30/2022  4:32 PM Rober Mauricio MD            RTC  Return in 8 weeks (on 1/27/2023) for 1pm, video visit.      Counseled the  patient on the importance of having a recovery program in addition to medication to manage recovery.  Components include avoiding isolating, having willingness to change, avoiding triggers and managing cravings. Encouraged having some type of sober network and practicing honesty with trusted support person(s). Encouraged other services such as counseling, 12 step or other self-help organizations.      Opioid warning reviewed.  Risk of overdose following a period of abstinence due to decrease tolerance was discussed including risk of death.  Strongly recommended abstain from alcohol, benzodiazepines, THC, opioids and other drugs of abuse.  Increased risk of return to opioid use after use of these substances discussed.  Increased risk of overdose/death with use of other substances particularly benzodiazepines/alcohol reviewed.        SUBJECTIVE                                                    Edgar Williamson is a 42 year old male who presents to clinic today for follow up    Visit performed In Person, face-to-face        Recent HPI Details:  HPI May 27, 2022  - Has been in significant pain recently from a back spasm that hasn't let go. Roughly the past 2 weeks  - Has used 1/2-1 extra film 1-2 days per week when pain is severe; refilled a few days early per phone note earlier this week  - Getting more custody of his son after a recent court hearing  - Will visit his grandfather's grave this weekend; he was a huge inspiration for recovery for James  - Mood positive, no concerns  - Enjoying his new job  - Got setup with counseling, 2 sessions so far, plans to continue intermittently  HPI Sep 28, 2022  - Finally quit smoking!  - Back still in pain most days. Pain improving slowly with daily exercises and improving core strength. Using extra dose of suboxone (1/2 film) about half the time  - Otherwise feeling well, mood stable, life going well  - Court issues were resolved with both him and his ex, and will not have  ongoing court involvement  - No substance use, no cravings  - Continues to attend meetings. Supporting others who are struggling, which is affirming and feels nice to be in that position      TODAY'S VISIT  HPI Nov 30, 2022  - Landlord has been threatening to evict him, which has been frustrating but he feels confident he will not be evicted  - [place] is sad about being declined from the apartments he is applying to, given his unlawful detainer on his record from 2015  - Pain has increased given these issues over the past 1-2 months. Using suboxone 5x/day  - Still going to the gym  - Quit smoking, avoiding fast food during the week      OBJECTIVE  PHYSICAL EXAM:  There were no vitals taken for this visit.    GENERAL: healthy, alert and no distress  EYES: Eyes grossly normal to inspection, PERRL and conjunctivae and sclerae normal  RESP: No respiratory distress  MENTAL STATUS EXAM  Appearance/Behavior: No appearant distress  Speech: Normal  Mood/Affect: depressed affect  Insight: Adequate      PHQ-9 Score:   PHQ 1/28/2022 3/25/2022 9/28/2022   PHQ-9 Total Score 0 0 0   Q9: Thoughts of better off dead/self-harm past 2 weeks Not at all Not at all Not at all       JULIETH-7 Score:  JULIETH-7 SCORE 11/30/2021 1/7/2022 1/28/2022   Total Score - - -   Total Score 0 (minimal anxiety) 0 (minimal anxiety) 0 (minimal anxiety)   Total Score 0 0 0       LABS (may not contain today's labs)                                                      Recent Tox13 results  Lab Results   Component Value Date    THC13 Detected (A) 11/30/2022    THC13 Detected (A) 05/27/2022    PCP13 Not Detected 11/30/2022    PCP13 Not Detected 05/27/2022    COC13 Not Detected 11/30/2022    COC13 Not Detected 05/27/2022    MAMP13 Not Detected 11/30/2022    MAMP13 Not Detected 05/27/2022    OPI13 Not Detected 11/30/2022    OPI13 Not Detected 05/27/2022    AMP13 Not Detected 11/30/2022    AMP13 Not Detected 05/27/2022    BZO13 Not Detected 11/30/2022    BZO13 Not  Detected 05/27/2022    TCA13 Not Detected 11/30/2022    TCA13 Not Detected 05/27/2022    MTD13 Not Detected 11/30/2022    MTD13 Not Detected 05/27/2022    BAR13 Not Detected 11/30/2022    BAR13 Not Detected 05/27/2022    OXY13 Not Detected 11/30/2022    OXY13 Not Detected 05/27/2022    PPX13 Not Detected 11/30/2022    PPX13 Not Detected 05/27/2022    BUP13 Detected (A) 11/30/2022    BUP13 Detected (A) 05/27/2022       Today's lab data  Results for orders placed or performed in visit on 11/30/22   Urine Drugs of Abuse Screen Panel 13     Status: Abnormal   Result Value Ref Range    Cannabinoids (33-ibo-4-carboxy-9-THC) Detected (A) Not Detected, Indeterminate    Phencyclidine Not Detected Not Detected, Indeterminate    Cocaine (Benzoylecgonine) Not Detected Not Detected, Indeterminate    Methamphetamine (d-Methamphetamine) Not Detected Not Detected, Indeterminate    Opiates (Morphine) Not Detected Not Detected, Indeterminate    Amphetamine (d-Amphetamine) Not Detected Not Detected, Indeterminate    Benzodiazepines (Nordiazepam) Not Detected Not Detected, Indeterminate    Tricyclic Antidepressants (Desipramine) Not Detected Not Detected, Indeterminate    Methadone Not Detected Not Detected, Indeterminate    Barbiturates (Butalbital) Not Detected Not Detected, Indeterminate    Oxycodone Not Detected Not Detected, Indeterminate    Propoxyphene (Norpropoxyphene) Not Detected Not Detected, Indeterminate    Buprenorphine Detected (A) Not Detected, Indeterminate           HISTORY                                                    Problem list reviewed & adjusted, as indicated.  Patient Active Problem List   Diagnosis     CARDIOVASCULAR SCREENING; LDL GOAL LESS THAN 160     Moderate major depression (H)     Generalized anxiety disorder     Benign meningioma of brain (H)     Primary osteoarthritis of lumbar spine     Strain of lumbar region, initial encounter     Trichiasis of right lower eyelid     Stasis dermatitis of both  legs     Seasonal allergies     Poisoning by benzodiazepines, undetermined, initial encounter     Nodule of right lung     Lower urinary tract infectious disease     Leukocytosis     Kidney stone     Eyelid laceration, right, initial encounter     Alcohol abuse     Controlled substance agreement signed     History of hepatitis C virus infection     Opioid use disorder, severe, dependence (H) with chronic pain     Tobacco dependence     High risk medication use         MEDICATION LIST (prior to visit)  gabapentin (NEURONTIN) 300 MG capsule, 2 tab tid ( 600 mg ) increased dose.  ibuprofen (ADVIL/MOTRIN) 800 MG tablet, Take 1 tablet (800 mg) by mouth every 8 hours as needed for moderate pain  Lidocaine (LIDOCARE) 4 % Patch, Place 1 patch onto the skin every 24 hours To prevent lidocaine toxicity, patient should be patch free for 12 hrs daily.  nicotine (NICORETTE) 4 MG lozenge, Place 1 lozenge (4 mg) inside cheek every hour as needed for smoking cessation    No current facility-administered medications on file prior to visit.      MEDICATION LIST (after visit)  Current Outpatient Medications   Medication     buprenorphine HCl-naloxone HCl (SUBOXONE) 8-2 MG per film     gabapentin (NEURONTIN) 300 MG capsule     ibuprofen (ADVIL/MOTRIN) 800 MG tablet     Lidocaine (LIDOCARE) 4 % Patch     nicotine (NICORETTE) 4 MG lozenge     No current facility-administered medications for this visit.         Allergies   Allergen Reactions     Diphenhydramine Other (See Comments)     Restless Legs/Feet  Other reaction(s): Restless Legs/Feet  Restless legs  Other reaction(s): Restless Legs/Feet       Methadone Other (See Comments)     Had terrible itching and redness and was pulled off methadone     Mirtazapine Other (See Comments)     Restless Legs/Feet  Other reaction(s): Restless Legs/Feet  Restless legs.  Other reaction(s): Restless Legs/Feet       Seasonal Allergies      Trazodone Other (See Comments) and Unknown     Behavioral  Disturbances  Other reaction(s): Behavioral Disturbances  Behavorial disturbance.  - restless legs         Additional MDM Details:  none    Rober Mauricio MD  [facility]  [phone]

## 2023-01-27 ENCOUNTER — VIRTUAL VISIT (OUTPATIENT)
Dept: ADDICTION MEDICINE | Facility: CLINIC | Age: 43
End: 2023-01-27
Payer: COMMERCIAL

## 2023-01-27 DIAGNOSIS — F11.20 OPIOID USE DISORDER, SEVERE, DEPENDENCE (H): Primary | ICD-10-CM

## 2023-01-27 DIAGNOSIS — F17.200 TOBACCO DEPENDENCE: ICD-10-CM

## 2023-01-27 DIAGNOSIS — F32.5 MAJOR DEPRESSIVE DISORDER IN FULL REMISSION, UNSPECIFIED WHETHER RECURRENT (H): ICD-10-CM

## 2023-01-27 PROCEDURE — 99214 OFFICE O/P EST MOD 30 MIN: CPT | Mod: 95 | Performed by: FAMILY MEDICINE

## 2023-01-27 RX ORDER — BUPRENORPHINE AND NALOXONE 8; 2 MG/1; MG/1
FILM, SOLUBLE BUCCAL; SUBLINGUAL
Qty: 75 FILM | Refills: 2 | Status: SHIPPED | OUTPATIENT
Start: 2023-01-27 | End: 2023-04-24

## 2023-01-27 NOTE — PROGRESS NOTES
James is a 42 year old who is being evaluated via a billable video visit.      How would you like to obtain your AVS? MyChart  If the video visit is dropped, the invitation should be resent by: Text to cell phone: [phone]  Will anyone else be joining your video visit? No

## 2023-01-27 NOTE — PROGRESS NOTES
[facility]    A/P                                                    ASSESSMENT/PLAN  Diagnoses and all orders for this visit:  Opioid use disorder, severe, dependence (H)  -     buprenorphine HCl-naloxone HCl (SUBOXONE) 8-2 MG per film; Place 0.5 Film under the tongue 4 times daily. May also place 0.5 Film daily as needed (pain). Not to exceed 20 mg per day  Major depressive disorder in full remission, unspecified whether recurrent (H)  Tobacco dependence    Orders Placed This Encounter   Medications     buprenorphine HCl-naloxone HCl (SUBOXONE) 8-2 MG per film     Sig: Place 0.5 Film under the tongue 4 times daily. May also place 0.5 Film daily as needed (pain). Not to exceed 20 mg per day     Dispense:  75 Film     Refill:  2       Problem list updated Jan 27, 2023   No problems updated.      Jan 27, 2023  - Continue suboxone at 5x daily with recurrent back pain  - Stretches regularly, and attempting to be more physically active otherwise. Notes the spring/summer is better for him to increase activity  - Mood improved with new apartment  - Continues to attend meetings  - Continues to be abstinent from tobacco, using NRT and vaping occasionally      Last encounter A/P  Nov 30, 2022  - Stable OUD, no cravings or other substance use  - Increased buprenorphine again to 5x daily d/t flare of back pain, likely d/t emotionally laden life changes  - Brought [facility] Natividad into appt for warm handoff today, and James will see her for intake next Monday  - Mood otherwise positive  - Attending meetings for support and with his son on the weekends, which is always pleasant and distracting      PDMP Review       Value Time User    State PDMP site checked  Yes 1/27/2023  1:17 PM Rober Mauricio MD            RTC  Return in about 12 weeks (around 4/21/2023) for on/before approx date above, in person.      Counseled the patient on the importance of having a recovery program in addition to medication  to manage recovery.  Components include avoiding isolating, having willingness to change, avoiding triggers and managing cravings. Encouraged having some type of sober network and practicing honesty with trusted support person(s). Encouraged other services such as counseling, 12 step or other self-help organizations.      Opioid warning reviewed.  Risk of overdose following a period of abstinence due to decrease tolerance was discussed including risk of death.  Strongly recommended abstain from alcohol, benzodiazepines, THC, opioids and other drugs of abuse.  Increased risk of return to opioid use after use of these substances discussed.  Increased risk of overdose/death with use of other substances particularly benzodiazepines/alcohol reviewed.        SUBJECTIVE                                                    Edgar Williamson is a 42 year old male who presents to clinic today for follow up    Visit performed Virtual, via video    Video-Visit Details    Type of service:  Video Visit    Video Start Time: 1:15 PM  Video End Time: 1:30 PM    Originating Location (pt. Location): Home    Distant Location (provider location):  [facility] office     Platform used for Video Visit: Syntasia        PHQ-9 Score:   PHQ 3/25/2022 9/28/2022 1/27/2023   PHQ-9 Total Score 0 0 0   Q9: Thoughts of better off dead/self-harm past 2 weeks Not at all Not at all Not at all       JULIETH-7 Score:  JULIETH-7 SCORE 11/30/2021 1/7/2022 1/28/2022   Total Score - - -   Total Score 0 (minimal anxiety) 0 (minimal anxiety) 0 (minimal anxiety)   Total Score 0 0 0           Recent HPI Details:  HPI Sep 28, 2022  - Finally quit smoking!  - Back still in pain most days. Pain improving slowly with daily exercises and improving core strength. Using extra dose of suboxone (1/2 film) about half the time  - Otherwise feeling well, mood stable, life going well  - Court issues were resolved with both him and his ex, and will not have ongoing  "court involvement  - No substance use, no cravings  - Continues to attend meetings. Supporting others who are struggling, which is affirming and feels nice to be in that position  HPI Nov 30, 2022  - [employer] has been threatening to evict him, which has been frustrating but he feels confident he will not be evicted  - [place] is sad about being declined from the apartments he is applying to, given his unlawful detainer on his record from 2015  - Pain has increased given these issues over the past 1-2 months. Using suboxone 5x/day  - Still going to the gym  - Quit smoking, avoiding fast food during the week        TODAY'S VISIT  HPI Jan 27, 2023  - Moved out of his apartment, and since then his mood and balance has improved  - Sleep has been difficult, largely d/t back pain. This has been \"normal for a long time\"  - Stretches regularly, tries to stay active but this is difficult for him in the winter. Also does yoga practice. Considering acupuncture  - Continues to take 5 doses suboxone daliy for breakthrough pain most days  - Continues to remain abstinent from cigarettes. Vaping at times, and uses nicotine lozenges as well  - Remains abstinent, no cravings      OBJECTIVE                                                    PHYSICAL EXAM:  There were no vitals taken for this visit.    GENERAL: healthy, alert and no distress  EYES: Eyes grossly normal to inspection, PERRL and conjunctivae and sclerae normal  RESP: No respiratory distress  MENTAL STATUS EXAM  Appearance/Behavior: No appearant distress  Speech: Normal  Mood/Affect: normal affect  Insight: Adequate    LAB  No results found for any visits on 01/27/23.      HISTORY                                                    Problem list reviewed & adjusted, as indicated.  Patient Active Problem List   Diagnosis     CARDIOVASCULAR SCREENING; LDL GOAL LESS THAN 160     Moderate major depression (H)     Generalized anxiety disorder     Benign meningioma of brain (H)     " Primary osteoarthritis of lumbar spine     Strain of lumbar region, initial encounter     Trichiasis of right lower eyelid     Stasis dermatitis of both legs     Seasonal allergies     Poisoning by benzodiazepines, undetermined, initial encounter     Nodule of right lung     Lower urinary tract infectious disease     Leukocytosis     Kidney stone     Eyelid laceration, right, initial encounter     Alcohol abuse     Controlled substance agreement signed     History of hepatitis C virus infection     Opioid use disorder, severe, dependence (H) with chronic pain     Tobacco dependence     High risk medication use         MEDICATION LIST (prior to visit)  gabapentin (NEURONTIN) 300 MG capsule, 2 tab tid ( 600 mg ) increased dose.  ibuprofen (ADVIL/MOTRIN) 800 MG tablet, Take 1 tablet (800 mg) by mouth every 8 hours as needed for moderate pain  Lidocaine (LIDOCARE) 4 % Patch, Place 1 patch onto the skin every 24 hours To prevent lidocaine toxicity, patient should be patch free for 12 hrs daily.  nicotine (NICORETTE) 4 MG lozenge, Place 1 lozenge (4 mg) inside cheek every hour as needed for smoking cessation    No current facility-administered medications on file prior to visit.      MEDICATION LIST (after visit)  Current Outpatient Medications   Medication     buprenorphine HCl-naloxone HCl (SUBOXONE) 8-2 MG per film     gabapentin (NEURONTIN) 300 MG capsule     ibuprofen (ADVIL/MOTRIN) 800 MG tablet     Lidocaine (LIDOCARE) 4 % Patch     nicotine (NICORETTE) 4 MG lozenge     No current facility-administered medications for this visit.         Allergies   Allergen Reactions     Diphenhydramine Other (See Comments)     Restless Legs/Feet  Other reaction(s): Restless Legs/Feet  Restless legs  Other reaction(s): Restless Legs/Feet       Methadone Other (See Comments)     Had terrible itching and redness and was pulled off methadone     Mirtazapine Other (See Comments)     Restless Legs/Feet  Other reaction(s): Restless  Legs/Feet  Restless legs.  Other reaction(s): Restless Legs/Feet       Seasonal Allergies      Trazodone Other (See Comments) and Unknown     Behavioral Disturbances  Other reaction(s): Behavioral Disturbances  Behavorial disturbance.  - restless legs         Additional MDM Details:  none    Rober Mauricio MD  [facility]  [phone]

## 2023-02-16 DIAGNOSIS — M47.816 PRIMARY OSTEOARTHRITIS OF LUMBAR SPINE: ICD-10-CM

## 2023-02-16 RX ORDER — GABAPENTIN 300 MG/1
CAPSULE ORAL
Qty: 540 CAPSULE | Refills: 0 | Status: SHIPPED | OUTPATIENT
Start: 2023-02-16 | End: 2023-03-15

## 2023-02-27 NOTE — TELEPHONE ENCOUNTER
Called patient and let him know that he is due for his annual physical. Patient said he will have to call us back to schedule.    BLAISE Mcmullen  [facility]

## 2023-03-15 ENCOUNTER — MYC REFILL (OUTPATIENT)
Dept: FAMILY MEDICINE | Facility: CLINIC | Age: 43
End: 2023-03-15
Payer: COMMERCIAL

## 2023-03-15 DIAGNOSIS — M47.816 PRIMARY OSTEOARTHRITIS OF LUMBAR SPINE: ICD-10-CM

## 2023-03-15 RX ORDER — GABAPENTIN 300 MG/1
CAPSULE ORAL
Qty: 540 CAPSULE | Refills: 0 | Status: SHIPPED | OUTPATIENT
Start: 2023-03-15 | End: 2023-04-05 | Stop reason: ALTCHOICE

## 2023-04-05 ENCOUNTER — OFFICE VISIT (OUTPATIENT)
Dept: FAMILY MEDICINE | Facility: CLINIC | Age: 43
End: 2023-04-05
Payer: COMMERCIAL

## 2023-04-05 VITALS
RESPIRATION RATE: 14 BRPM | HEIGHT: 72 IN | HEART RATE: 63 BPM | SYSTOLIC BLOOD PRESSURE: 102 MMHG | OXYGEN SATURATION: 99 % | DIASTOLIC BLOOD PRESSURE: 62 MMHG | BODY MASS INDEX: 23.84 KG/M2 | WEIGHT: 176 LBS | TEMPERATURE: 98 F

## 2023-04-05 DIAGNOSIS — F51.02 ADJUSTMENT INSOMNIA: ICD-10-CM

## 2023-04-05 DIAGNOSIS — M47.816 PRIMARY OSTEOARTHRITIS OF LUMBAR SPINE: ICD-10-CM

## 2023-04-05 DIAGNOSIS — F11.20 CONTINUOUS OPIOID DEPENDENCE (H): ICD-10-CM

## 2023-04-05 DIAGNOSIS — Z12.11 SCREEN FOR COLON CANCER: ICD-10-CM

## 2023-04-05 DIAGNOSIS — Z00.00 ROUTINE GENERAL MEDICAL EXAMINATION AT A HEALTH CARE FACILITY: Primary | ICD-10-CM

## 2023-04-05 LAB
ALBUMIN SERPL BCG-MCNC: 4.6 G/DL (ref 3.5–5.2)
ALP SERPL-CCNC: 48 U/L (ref 40–129)
ALT SERPL W P-5'-P-CCNC: 16 U/L (ref 10–50)
ANION GAP SERPL CALCULATED.3IONS-SCNC: 11 MMOL/L (ref 7–15)
AST SERPL W P-5'-P-CCNC: 22 U/L (ref 10–50)
BASOPHILS # BLD AUTO: 0 10E3/UL (ref 0–0.2)
BASOPHILS NFR BLD AUTO: 0 %
BILIRUB SERPL-MCNC: 0.4 MG/DL
BUN SERPL-MCNC: 14.1 MG/DL (ref 6–20)
CALCIUM SERPL-MCNC: 9.6 MG/DL (ref 8.6–10)
CHLORIDE SERPL-SCNC: 107 MMOL/L (ref 98–107)
CHOLEST SERPL-MCNC: 163 MG/DL
CREAT SERPL-MCNC: 1.11 MG/DL (ref 0.67–1.17)
DEPRECATED HCO3 PLAS-SCNC: 27 MMOL/L (ref 22–29)
EOSINOPHIL # BLD AUTO: 0.1 10E3/UL (ref 0–0.7)
EOSINOPHIL NFR BLD AUTO: 1 %
ERYTHROCYTE [DISTWIDTH] IN BLOOD BY AUTOMATED COUNT: 14.2 % (ref 10–15)
GFR SERPL CREATININE-BSD FRML MDRD: 84 ML/MIN/1.73M2
GLUCOSE SERPL-MCNC: 86 MG/DL (ref 70–99)
HCT VFR BLD AUTO: 37.2 % (ref 40–53)
HDLC SERPL-MCNC: 75 MG/DL
HGB BLD-MCNC: 13 G/DL (ref 13.3–17.7)
LDLC SERPL CALC-MCNC: 81 MG/DL
LYMPHOCYTES # BLD AUTO: 1.7 10E3/UL (ref 0.8–5.3)
LYMPHOCYTES NFR BLD AUTO: 18 %
MCH RBC QN AUTO: 29.3 PG (ref 26.5–33)
MCHC RBC AUTO-ENTMCNC: 34.9 G/DL (ref 31.5–36.5)
MCV RBC AUTO: 84 FL (ref 78–100)
MONOCYTES # BLD AUTO: 0.6 10E3/UL (ref 0–1.3)
MONOCYTES NFR BLD AUTO: 6 %
NEUTROPHILS # BLD AUTO: 6.9 10E3/UL (ref 1.6–8.3)
NEUTROPHILS NFR BLD AUTO: 75 %
NONHDLC SERPL-MCNC: 88 MG/DL
PLATELET # BLD AUTO: 229 10E3/UL (ref 150–450)
POTASSIUM SERPL-SCNC: 4.5 MMOL/L (ref 3.4–5.3)
PROT SERPL-MCNC: 7.2 G/DL (ref 6.4–8.3)
RBC # BLD AUTO: 4.44 10E6/UL (ref 4.4–5.9)
SODIUM SERPL-SCNC: 145 MMOL/L (ref 136–145)
TRIGL SERPL-MCNC: 36 MG/DL
WBC # BLD AUTO: 9.2 10E3/UL (ref 4–11)

## 2023-04-05 PROCEDURE — 99213 OFFICE O/P EST LOW 20 MIN: CPT | Mod: 25 | Performed by: FAMILY MEDICINE

## 2023-04-05 PROCEDURE — 99396 PREV VISIT EST AGE 40-64: CPT | Mod: 25 | Performed by: FAMILY MEDICINE

## 2023-04-05 PROCEDURE — 80053 COMPREHEN METABOLIC PANEL: CPT | Performed by: FAMILY MEDICINE

## 2023-04-05 PROCEDURE — 0124A COVID-19 VACCINE BIVALENT BOOSTER 12+ (PFIZER): CPT | Performed by: FAMILY MEDICINE

## 2023-04-05 PROCEDURE — 80061 LIPID PANEL: CPT | Performed by: FAMILY MEDICINE

## 2023-04-05 PROCEDURE — 90715 TDAP VACCINE 7 YRS/> IM: CPT | Performed by: FAMILY MEDICINE

## 2023-04-05 PROCEDURE — 36415 COLL VENOUS BLD VENIPUNCTURE: CPT | Performed by: FAMILY MEDICINE

## 2023-04-05 PROCEDURE — 85025 COMPLETE CBC W/AUTO DIFF WBC: CPT | Performed by: FAMILY MEDICINE

## 2023-04-05 PROCEDURE — 90471 IMMUNIZATION ADMIN: CPT | Performed by: FAMILY MEDICINE

## 2023-04-05 PROCEDURE — 91312 COVID-19 VACCINE BIVALENT BOOSTER 12+ (PFIZER): CPT | Performed by: FAMILY MEDICINE

## 2023-04-05 RX ORDER — IBUPROFEN 800 MG/1
800 TABLET, FILM COATED ORAL EVERY 8 HOURS PRN
Qty: 360 TABLET | Refills: 3 | Status: SHIPPED | OUTPATIENT
Start: 2023-04-05 | End: 2024-03-12

## 2023-04-05 RX ORDER — GABAPENTIN 600 MG/1
600 TABLET ORAL 2 TIMES DAILY
Qty: 180 TABLET | Refills: 3 | Status: SHIPPED | OUTPATIENT
Start: 2023-04-05 | End: 2024-03-08

## 2023-04-05 RX ORDER — ZOLPIDEM TARTRATE 5 MG/1
5 TABLET ORAL
Qty: 20 TABLET | Refills: 0 | Status: SHIPPED | OUTPATIENT
Start: 2023-04-05 | End: 2023-07-27

## 2023-04-05 ASSESSMENT — ENCOUNTER SYMPTOMS
FREQUENCY: 0
FEVER: 0
ABDOMINAL PAIN: 0
DIZZINESS: 0
NERVOUS/ANXIOUS: 0
HEMATOCHEZIA: 0
HEADACHES: 0
NAUSEA: 0
ENDOCRINE NEGATIVE: 1
ALLERGIC/IMMUNOLOGIC NEGATIVE: 1
COUGH: 0
HEMATOLOGIC/LYMPHATIC NEGATIVE: 1
DYSURIA: 0
WEAKNESS: 0
PARESTHESIAS: 0
HEMATURIA: 0
SHORTNESS OF BREATH: 0
DIARRHEA: 0
CHILLS: 0
ARTHRALGIAS: 0
JOINT SWELLING: 0
MYALGIAS: 1
EYE PAIN: 0
SORE THROAT: 0
HEARTBURN: 0
PALPITATIONS: 0
CONSTIPATION: 0

## 2023-04-05 ASSESSMENT — PAIN SCALES - GENERAL: PAINLEVEL: NO PAIN (0)

## 2023-04-05 NOTE — PROGRESS NOTES
SUBJECTIVE:   CC: James is an 42 year old who presents for preventative health visit.       4/5/2023     3:17 PM   Additional Questions   Roomed by Torito Manriquez CMA   Accompanied by None         4/5/2023     3:17 PM   Patient Reported Additional Medications   Patient reports taking the following new medications No        Comes for an annual exam.  Patient continues to use Suboxone, thru pain clinic    Continues to use gabapentin.  He quit smoking over 6 months ago.    Currently, he is between jobs.  He is having a hard time falling asleep.  In the past he used Ambien for a few weeks.  He denies alcohol abuse or drugs abuse.  No depression, no suicidal thoughts or ideation    Patient has been advised of split billing requirements and indicates understanding: Yes  Healthy Habits:     Getting at least 3 servings of Calcium per day:  NO    Bi-annual eye exam:  Yes    Dental care twice a year:  NO    Sleep apnea or symptoms of sleep apnea:  None    Diet:  Regular (no restrictions)    Frequency of exercise:  1 day/week    Duration of exercise:  Less than 15 minutes    Taking medications regularly:  Yes    Barriers to taking medications:  None    Medication side effects:  None    PHQ-2 Total Score: 0    Additional concerns today:  No      Today's PHQ-2 Score:       4/5/2023     3:09 PM   PHQ-2 ( 1999 [employer])   Q1: Little interest or pleasure in doing things 0   Q2: Feeling down, depressed or hopeless 0   PHQ-2 Score 0   Q1: Little interest or pleasure in doing things Not at all   Q2: Feeling down, depressed or hopeless Not at all   PHQ-2 Score 0       Have you ever done Advance Care Planning? (For example, a Health Directive, POLST, or a discussion with a medical provider or your loved ones about your wishes): No, advance care planning information given to patient to review.  Patient plans to discuss their wishes with loved ones or provider.      Social History     Tobacco Use     Smoking status: Former     Packs/day:  0.25     Types: Cigarettes     Passive exposure: Past     Smokeless tobacco: Never     Tobacco comments:     quit smoking 2 months ago 9/28/22   Vaping Use     Vaping status: Never Used     Passive vaping exposure: Yes   Substance Use Topics     Alcohol use: No             4/5/2023     3:09 PM   Alcohol Use   Prescreen: >3 drinks/day or >7 drinks/week? Not Applicable          View : No data to display.                Last PSA: No results found for: PSA    Reviewed orders with patient. Reviewed health maintenance and updated orders accordingly - Yes  Labs reviewed in EPIC  BP Readings from Last 3 Encounters:   04/05/23 102/62   05/27/22 128/69   01/28/22 120/48    Wt Readings from Last 3 Encounters:   04/05/23 79.8 kg (176 lb)   01/28/22 88.5 kg (195 lb)   01/07/22 86.2 kg (190 lb)                  Patient Active Problem List   Diagnosis     CARDIOVASCULAR SCREENING; LDL GOAL LESS THAN 160     Moderate major depression (H)     Generalized anxiety disorder     Benign meningioma of brain (H)     Primary osteoarthritis of lumbar spine     Strain of lumbar region, initial encounter     Trichiasis of right lower eyelid     Stasis dermatitis of both legs     Seasonal allergies     Poisoning by benzodiazepines, undetermined, initial encounter     Nodule of right lung     Lower urinary tract infectious disease     Leukocytosis     Kidney stone     Eyelid laceration, right, initial encounter     Alcohol abuse     Controlled substance agreement signed     History of hepatitis C virus infection     Opioid use disorder, severe, dependence (H) with chronic pain     Tobacco dependence     High risk medication use     F11.2 - Continuous opioid dependence (H)     Past Surgical History:   Procedure Laterality Date     SD TOOTH EXTRACTION W/FORCEP  11/2020       Social History     Tobacco Use     Smoking status: Former     Packs/day: 0.25     Years: 25.00     Pack years: 6.25     Types: Cigarettes     Quit date: 11/22/2022     Years  since quittin.3     Passive exposure: Past     Smokeless tobacco: Never     Tobacco comments:     quit smoking 2 months ago 22   Vaping Use     Vaping status: Never Used     Passive vaping exposure: Yes   Substance Use Topics     Alcohol use: No     Family History   Problem Relation Age of Onset     Cancer Mother      Alcohol/Drug Father      Psychotic Disorder Father         anxiety     Asthma Maternal Grandmother      Psychotic Disorder Maternal Grandmother         anxiety     Heart Disease Maternal Grandfather      Circulatory Maternal Grandfather      Psychotic Disorder Maternal Grandfather         anxiety, depression         Current Outpatient Medications   Medication Sig Dispense Refill     buprenorphine HCl-naloxone HCl (SUBOXONE) 8-2 MG per film Place 0.5 Film under the tongue 4 times daily. May also place 0.5 Film daily as needed (pain). Not to exceed 20 mg per day 75 Film 2     gabapentin (NEURONTIN) 600 MG tablet Take 1 tablet (600 mg) by mouth 2 times daily 180 tablet 3     ibuprofen (ADVIL/MOTRIN) 800 MG tablet Take 1 tablet (800 mg) by mouth every 8 hours as needed for moderate pain 360 tablet 3     nicotine (NICORETTE) 4 MG lozenge PLACE 1 LOZENGE(4 MG) BY MOUTH INSIDE CHEEK EVERY HOUR AS NEEDED FOR SMOKING CESSATION 144 lozenge 11     zolpidem (AMBIEN) 5 MG tablet Take 1 tablet (5 mg) by mouth nightly as needed for sleep 20 tablet 0       Reviewed and updated as needed this visit by clinical staff   Tobacco  Allergies  Meds   Med Hx  Surg Hx  Fam Hx  Soc Hx        Reviewed and updated as needed this visit by Provider                 Past Medical History:   Diagnosis Date     Anxiety      Benign meningioma of brain (H)      Depressive disorder      Moderate major depression (H) 2011     Primary osteoarthritis of lumbar spine 2019      Past Surgical History:   Procedure Laterality Date     IN TOOTH EXTRACTION W/FORCEP  2020     OB History   No obstetric history on file.        Review of Systems   Constitutional: Negative for chills and fever.   HENT: Negative for congestion, ear pain, hearing loss and sore throat.    Eyes: Negative for pain and visual disturbance.   Respiratory: Negative for cough and shortness of breath.    Cardiovascular: Negative for chest pain, palpitations and peripheral edema.   Gastrointestinal: Negative for abdominal pain, constipation, diarrhea, heartburn, hematochezia and nausea.   Endocrine: Negative.    Genitourinary: Positive for penile discharge and urgency. Negative for dysuria, frequency, genital sores, hematuria and impotence.   Musculoskeletal: Positive for myalgias. Negative for arthralgias and joint swelling.   Skin: Negative for rash.   Allergic/Immunologic: Negative.    Neurological: Negative for dizziness, weakness, headaches and paresthesias.   Hematological: Negative.    Psychiatric/Behavioral: Positive for mood changes. The patient is not nervous/anxious.      CONSTITUTIONAL: NEGATIVE for fever, chills, change in weight  INTEGUMENTARY/SKIN: NEGATIVE for worrisome rashes, moles or lesions  EYES: NEGATIVE for vision changes or irritation  ENT: NEGATIVE for ear, mouth and throat problems  RESP: NEGATIVE for significant cough or SOB  CV: NEGATIVE for chest pain, palpitations or peripheral edema  GI: NEGATIVE for nausea, abdominal pain, heartburn, or change in bowel habits   male: negative for dysuria, hematuria, decreased urinary stream, erectile dysfunction, urethral discharge  MUSCULOSKELETAL: NEGATIVE for significant arthralgias or myalgia  NEURO: NEGATIVE for weakness, dizziness or paresthesias  ENDOCRINE: NEGATIVE for temperature intolerance, skin/hair changes  HEME/ALLERGY/IMMUNE: NEGATIVE for bleeding problems  PSYCHIATRIC: NEGATIVE for changes in mood or affect    OBJECTIVE:   /62 (BP Location: Right arm, Patient Position: Chair, Cuff Size: Adult Regular)   Pulse 63   Temp 98  F (36.7  C) (Tympanic)   Resp 14   Ht 1.82 m (5'  "11.65\")   Wt 79.8 kg (176 lb)   SpO2 99%   BMI 24.10 kg/m      Physical Exam  GENERAL: healthy, alert and no distress  EYES: Eyes grossly normal to inspection, PERRL and conjunctivae and sclerae normal  HENT: ear canals and TM's normal, nose and mouth without ulcers or lesions  NECK: no adenopathy, no asymmetry, masses, or scars and thyroid normal to palpation  RESP: lungs clear to auscultation - no rales, rhonchi or wheezes  CV: regular rate and rhythm, normal S1 S2, no S3 or S4, no murmur, click or rub, no peripheral edema and peripheral pulses strong  ABDOMEN: soft, nontender, no hepatosplenomegaly, no masses and bowel sounds normal  MS: no gross musculoskeletal defects noted, no edema  SKIN: no suspicious lesions or rashes  NEURO: Normal strength and tone, mentation intact and speech normal  PSYCH: mentation appears normal, affect normal/bright    Diagnostic Test Results:  Labs reviewed in Epic  Orders Placed This Encounter   Procedures     REVIEW OF HEALTH MAINTENANCE PROTOCOL ORDERS     TDAP VACCINE (Adacel, Boostrix)     COVID-19 VACCINE BIVALENT BOOSTER 12+ (PFIZER)     Lipid panel reflex to direct LDL Fasting     Comprehensive metabolic panel (BMP + Alb, Alk Phos, ALT, AST, Total. Bili, TP)     Colonoscopy Screening  Referral     CBC with platelets and differential       ASSESSMENT/PLAN:   (Z00.00) Routine general medical examination at a health care facility  (primary encounter diagnosis)  Comment: normal exam  Plan: COVID-19 VACCINE BIVALENT BOOSTER 12+ (PFIZER),        Lipid panel reflex to direct LDL Fasting,         Comprehensive metabolic panel (BMP + Alb, Alk         Phos, ALT, AST, Total. Bili, TP), CBC with         platelets and differential         Discussed diet, exercise, wellness and other preventive recommendations related to health maintenance.   Follow up as needed for acute issues.    Physical exam recommended in one year.       (F11.20) F11.2 - Continuous opioid dependence " (H)  Comment:   Plan: follows with pain management.    (M47.816) Primary osteoarthritis of lumbar spine  Comment:   Plan: gabapentin (NEURONTIN) 600 MG tablet, ibuprofen        (ADVIL/MOTRIN) 800 MG tablet        Continue with Gabapentin and ibuprofen    (F51.02) Adjustment insomnia  Comment:   Plan: zolpidem (AMBIEN) 5 MG tablet        Use as needed, short term    (Z12.11) Screen for colon cancer  Comment:   Plan: Colonoscopy Screening  Referral        Screening.      Patient has been advised of split billing requirements and indicates understanding: Yes      COUNSELING:   Reviewed preventive health counseling, as reflected in patient instructions       Regular exercise       Healthy diet/nutrition       Immunizations    Vaccinated for: Covid-19 and TDAP             Colorectal cancer screening        He reports that he has quit smoking. His smoking use included cigarettes. He smoked an average of .25 packs per day. He has been exposed to tobacco smoke. He has never used smokeless tobacco.            Charlee Hayes MD  [facility]

## 2023-04-06 ENCOUNTER — TELEPHONE (OUTPATIENT)
Dept: FAMILY MEDICINE | Facility: CLINIC | Age: 43
End: 2023-04-06
Payer: COMMERCIAL

## 2023-04-06 NOTE — TELEPHONE ENCOUNTER
RN called patient/family and relayed provider's message. Patient/family verbalized understanding.     RN sent mychart per pt request with colonoscopy information in case they do not reach out.    Berta Marin RN, BSN  [facility]: [place]

## 2023-04-06 NOTE — TELEPHONE ENCOUNTER
----- Message from Charlee Hayes MD sent at 4/6/2023  8:59 AM CDT -----  Please call pt with his results  Normal for Kidney and liver functions.  Normal for blood sugar and electrolyte.    Total Cholesterol normal, and LDL normal.    Hemoglobin at lower side    Have pt set up his Colonoscopy    thanks

## 2023-04-17 ENCOUNTER — MYC MEDICAL ADVICE (OUTPATIENT)
Dept: ADDICTION MEDICINE | Facility: CLINIC | Age: 43
End: 2023-04-17
Payer: COMMERCIAL

## 2023-04-17 DIAGNOSIS — S39.012A STRAIN OF LUMBAR REGION, INITIAL ENCOUNTER: ICD-10-CM

## 2023-04-17 DIAGNOSIS — M47.816 PRIMARY OSTEOARTHRITIS OF LUMBAR SPINE: Primary | ICD-10-CM

## 2023-04-17 RX ORDER — LIDOCAINE 4 G/G
1 PATCH TOPICAL EVERY 24 HOURS
Qty: 30 PATCH | Refills: 4 | Status: SHIPPED | OUTPATIENT
Start: 2023-04-17

## 2023-04-20 ENCOUNTER — THERAPY VISIT (OUTPATIENT)
Dept: PHYSICAL THERAPY | Facility: CLINIC | Age: 43
End: 2023-04-20
Attending: FAMILY MEDICINE
Payer: COMMERCIAL

## 2023-04-20 DIAGNOSIS — M47.816 PRIMARY OSTEOARTHRITIS OF LUMBAR SPINE: ICD-10-CM

## 2023-04-20 DIAGNOSIS — M54.6 MIDLINE THORACIC BACK PAIN: Primary | ICD-10-CM

## 2023-04-20 PROCEDURE — 97161 PT EVAL LOW COMPLEX 20 MIN: CPT | Mod: GP | Performed by: PHYSICAL THERAPIST

## 2023-04-20 PROCEDURE — 97140 MANUAL THERAPY 1/> REGIONS: CPT | Mod: GP | Performed by: PHYSICAL THERAPIST

## 2023-04-20 PROCEDURE — 97110 THERAPEUTIC EXERCISES: CPT | Mod: GP | Performed by: PHYSICAL THERAPIST

## 2023-04-20 NOTE — PROGRESS NOTES
Physical Therapy Initial Evaluation  Subjective:  The history is provided by the patient. No  was used.   Patient Health History  Edgar Williamson being seen for Low Back Pain.     Problem began: 4/20/2023 (referral).   Problem occurred: unknown   Pain is reported as 6/10 on pain scale.  General health as reported by patient is good.  Pertinent medical history includes: mental illness and smoking. Other medical history details: Arthritic spine.   Red flags:  Pain at rest/night.  Medical allergies: none.   Surgeries include:  None.    Current medications:  Pain medication.    Current occupation is .   Primary job tasks include:  Computer work, operating a machine/assembly, prolonged standing and repetitive tasks.                  Therapist Generated HPI Evaluation  Problem details: Patient presents to PT stating he has a long history of low back pain however, does not want his low back addressed and instead mid back as he feels it has limited him from his day to day activities. States the mid back pain came on in an unknown fashion and cannot pinpoint how long. States the pain is constant and worsened with particular movements. The pain can last up to 4-6 days following a certain movement in which his sleep is impacted. Works as a  where he has to bend over and reach for a prolonged period of time for work duties feels this likely contributes to his symptoms. Has taken pain medication, works out, and attempts self adjusting to alleviate symptoms. Wants to learn exercises to help with symptoms. Points to pain on R thorax at T7-8 level and states it sometimes radiates all throughout his back. Likes to golf and would like to return to participating in 9-18 holes as well as going to look for agates with son. .         Type of problem:  Thoracic (R).    This is a chronic condition.  Condition occurred with:  Insidious onset.  Where condition occurred: at work, in the community and  for unknown reasons.  Patient reports pain:  Upper thoracic spine and mid thoracic spine.  Pain is described as aching, cramping and shooting   Radiates to: throughout spine, mostly thoracic/upper back radiation  Pain is the same all the time.  Since onset symptoms are gradually worsening.  Associated symptoms:  Fatigue, loss of strength and loss of motion. Symptoms are exacerbated by bending, twisting and certain positions  and relieved by analgesics.    Previous treatment includes physical therapy. There was mild improvement following previous treatment.  Restrictions due to condition include:  Working in normal job without restrictions.  Barriers include:  None as reported by patient.                        Objective:  System         Lumbar/SI Evaluation            Neural Tension/Mobility:      Left side:SLR  negative.     Right side:   SLR  negative.   Lumbar Palpation:      Tenderness not present at Left:    Vertebral    Tenderness not present at Right:  Vertebral             Cervical/Thoracic Evaluation    AROM:    AROM Thoracic:    Flexion:               Nil  Extension:          Mod loss  Rotation:            Left: mod loss     Right: mod loss                Cervical Palpation:  : Hypomobility T4-T10 PAs.  Tenderness present at Left:    Rhomboids; Upper Trap and Facet  Tenderness present at Right:    Rhomboids; Upper Trap and Facet                                                  General     ROS    Assessment/Plan:    Patient is a 42 year old male with thoracic complaints.    Patient has the following significant findings with corresponding treatment plan.                Diagnosis 1:  Chronic Mid Thoracic Pain  Pain -  manual therapy, education and home program  Decreased ROM/flexibility - manual therapy and therapeutic exercise  Decreased joint mobility - manual therapy and therapeutic exercise  Decreased strength - therapeutic exercise and therapeutic activities  Impaired muscle performance - neuro  re-education  Decreased function - therapeutic activities  Impaired posture - neuro re-education    Therapy Evaluation Codes:   1) History comprised of:   Personal factors that impact the plan of care:      None.    Comorbidity factors that impact the plan of care are:      Pain at night/rest.     Medications impacting care: Anti-inflammatory.  2) Examination of Body Systems comprised of:   Body structures and functions that impact the plan of care:      Thoracic Spine.   Activity limitations that impact the plan of care are:      Bending, Grasping, Squatting/kneeling, Throwing, Working and Sleeping.  3) Clinical presentation characteristics are:   Stable/Uncomplicated.  4) Decision-Making    Low complexity using standardized patient assessment instrument and/or measureable assessment of functional outcome.  Cumulative Therapy Evaluation is: Low complexity.    Previous and current functional limitations:  (See Goal Flow Sheet for this information)    Short term and Long term goals: (See Goal Flow Sheet for this information)     Communication ability:  Patient appears to be able to clearly communicate and understand verbal and written communication and follow directions correctly.  Treatment Explanation - The following has been discussed with the patient:   RX ordered/plan of care  Anticipated outcomes  Possible risks and side effects  This patient would benefit from PT intervention to resume normal activities.   Rehab potential is good.    Frequency:  1 X week, once daily  Duration:  for 8-10 weeks  Discharge Plan:  Achieve all LTG.  Independent in home treatment program.  Reach maximal therapeutic benefit.    Please refer to the daily flowsheet for treatment today, total treatment time and time spent performing 1:1 timed codes.

## 2023-04-21 NOTE — PROGRESS NOTES
[facility]    OUTPATIENT Physical Therapy ORTHOPEDIC EVALUATION  PLAN OF TREATMENT FOR OUTPATIENT REHABILITATION  (COMPLETE FOR INITIAL CLAIMS ONLY)  Patient's Last Name, First Name, M.I.  YOB: 1980  Edgar Williamson    Provider s Name:  [facility]   Medical Record No.  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 (for work))   Rationale for job requirements in their work place  (for return to golfing)   Due date 05/18/23   LTG Target Performance Reach  (>30-60 min pain free while working in machinery)   Rationale for job requirements in their work place  (for return to golf participation)   Due date 06/29/23         Therapy Frequency:  1x per week  Predicted Duration of Therapy Intervention:  10 weeks    Shonna Harrington, PT                 I CERTIFY THE NEED FOR THESE SERVICES FURNISHED UNDER        THIS PLAN OF TREATMENT AND WHILE UNDER MY CARE     (Physician attestation of this document indicates review and certification of the therapy plan).                     Certification Date From:  04/20/23   Certification Date To:  06/29/23    Referring Provider:  Rober Mauricio    Initial Assessment        See Epic Evaluation SOC Date: 04/20/23

## 2023-04-24 DIAGNOSIS — F11.20 OPIOID USE DISORDER, SEVERE, DEPENDENCE (H): ICD-10-CM

## 2023-04-24 RX ORDER — BUPRENORPHINE AND NALOXONE 8; 2 MG/1; MG/1
FILM, SOLUBLE BUCCAL; SUBLINGUAL
Qty: 75 FILM | Refills: 0 | Status: SHIPPED | OUTPATIENT
Start: 2023-04-24 | End: 2023-05-05

## 2023-04-24 NOTE — TELEPHONE ENCOUNTER
Date of Last Office Visit: 1/27/2023  Date of Next Office Visit: 5/5/2023  No shows since last visit: None  Cancellations since last visit: None    Medication requested: Suboxone 8-2 mg film Date last ordered: 1/27/2023 Qty: 75 Refills: 2     Review of MN ?: Yes        Lapse in medication adherence greater than 5 days?: No  If yes, call patient and gather details: N/A  Medication refill request verified as identical to current order?: No, pending bridge  Result of Last DAM, VPA, Li+ Level, CBC, or Carbamazepine Level (at or since last visit): N/A    Last visit treatment plan:   ASSESSMENT/PLAN  Diagnoses and all orders for this visit:  Opioid use disorder, severe, dependence (H)  -     buprenorphine HCl-naloxone HCl (SUBOXONE) 8-2 MG per film; Place 0.5 Film under the tongue 4 times daily. May also place 0.5 Film daily as needed (pain). Not to exceed 20 mg per day  Major depressive disorder in full remission, unspecified whether recurrent (H)  Tobacco dependence          Orders Placed This Encounter   Medications     buprenorphine HCl-naloxone HCl (SUBOXONE) 8-2 MG per film       Sig: Place 0.5 Film under the tongue 4 times daily. May also place 0.5 Film daily as needed (pain). Not to exceed 20 mg per day       Dispense:  75 Film       Refill:  2         Problem list updated Jan 27, 2023   No problems updated.        Jan 27, 2023  - Continue suboxone at 5x daily with recurrent back pain  - Stretches regularly, and attempting to be more physically active otherwise. Notes the spring/summer is better for him to increase activity  - Mood improved with new apartment  - Continues to attend meetings  - Continues to be abstinent from tobacco, using NRT and vaping occasionally        Last encounter A/P  Nov 30, 2022  - Stable OUD, no cravings or other substance use  - Increased buprenorphine again to 5x daily d/t flare of back pain, likely d/t emotionally laden life changes  - Brought [facility] into appt for warm handoff  today, and James will see her for intake next Monday  - Mood otherwise positive  - Attending meetings for support and with his son on the weekends, which is always pleasant and distracting               PDMP Review        Value Time User     State PDMP site checked  Yes 1/27/2023  1:17 PM Rober Mauricio MD                RTC  Return in about 12 weeks (around 4/21/2023) for on/before approx date above, in person.      []Medication refilled per  Medication Refill in Ambulatory Care  policy.  [x]Medication unable to be refilled by RN due to criteria not met as indicated below:    []Eligibility - not seen in the last year   []Supervision - no future appointment   []Compliance - no shows, cancellations or lapse in therapy   []Verification - order discrepancy   [x]Controlled medication   [x]Medication not included in policy   []90-day supply request   []Other

## 2023-04-28 ENCOUNTER — THERAPY VISIT (OUTPATIENT)
Dept: PHYSICAL THERAPY | Facility: CLINIC | Age: 43
End: 2023-04-28
Payer: COMMERCIAL

## 2023-04-28 DIAGNOSIS — M54.6 MIDLINE THORACIC BACK PAIN, UNSPECIFIED CHRONICITY: Primary | ICD-10-CM

## 2023-04-28 PROCEDURE — 97140 MANUAL THERAPY 1/> REGIONS: CPT | Mod: GP | Performed by: PHYSICAL THERAPIST

## 2023-04-28 PROCEDURE — 97110 THERAPEUTIC EXERCISES: CPT | Mod: GP | Performed by: PHYSICAL THERAPIST

## 2023-05-05 ENCOUNTER — MYC MEDICAL ADVICE (OUTPATIENT)
Dept: ADDICTION MEDICINE | Facility: CLINIC | Age: 43
End: 2023-05-05

## 2023-05-05 ENCOUNTER — OFFICE VISIT (OUTPATIENT)
Dept: ADDICTION MEDICINE | Facility: CLINIC | Age: 43
End: 2023-05-05
Payer: COMMERCIAL

## 2023-05-05 VITALS — DIASTOLIC BLOOD PRESSURE: 76 MMHG | HEART RATE: 69 BPM | SYSTOLIC BLOOD PRESSURE: 126 MMHG

## 2023-05-05 DIAGNOSIS — M47.816 PRIMARY OSTEOARTHRITIS OF LUMBAR SPINE: ICD-10-CM

## 2023-05-05 DIAGNOSIS — F43.20 ADJUSTMENT DISORDER, UNSPECIFIED TYPE: ICD-10-CM

## 2023-05-05 DIAGNOSIS — F11.20 OPIOID USE DISORDER, SEVERE, DEPENDENCE (H): Primary | ICD-10-CM

## 2023-05-05 LAB
AMPHETAMINE QUAL URINE POCT: NEGATIVE
BARBITURATE QUAL URINE POCT: NEGATIVE
BENZODIAZEPINE QUAL URINE POCT: NEGATIVE
BUPRENORPHINE QUAL URINE POCT: ABNORMAL
COCAINE QUAL URINE POCT: NEGATIVE
CREATININE QUAL URINE POCT: ABNORMAL
INTERNAL QC QUAL URINE POCT: ABNORMAL
MDMA QUAL URINE POCT: NEGATIVE
METHADONE QUAL URINE POCT: NEGATIVE
METHAMPHETAMINE QUAL URINE POCT: NEGATIVE
OPIATE QUAL URINE POCT: NEGATIVE
OXYCODONE QUAL URINE POCT: NEGATIVE
PH QUAL URINE POCT: ABNORMAL
PHENCYCLIDINE QUAL URINE POCT: NEGATIVE
POCT KIT EXPIRATION DATE: ABNORMAL
POCT KIT LOT NUMBER: ABNORMAL
SPECIFIC GRAVITY POCT: 1.01
TEMPERATURE URINE POCT: ABNORMAL
THC QUAL URINE POCT: ABNORMAL

## 2023-05-05 PROCEDURE — 80305 DRUG TEST PRSMV DIR OPT OBS: CPT | Performed by: FAMILY MEDICINE

## 2023-05-05 PROCEDURE — 99214 OFFICE O/P EST MOD 30 MIN: CPT | Performed by: FAMILY MEDICINE

## 2023-05-05 RX ORDER — BUPRENORPHINE AND NALOXONE 8; 2 MG/1; MG/1
1 FILM, SOLUBLE BUCCAL; SUBLINGUAL 3 TIMES DAILY
Qty: 90 FILM | Refills: 1 | Status: SHIPPED | OUTPATIENT
Start: 2023-05-05 | End: 2023-07-25

## 2023-05-05 NOTE — PROGRESS NOTES
[facility] Addiction Medicine    A/P                                                    ASSESSMENT/PLAN  Diagnoses and all orders for this visit:  Opioid use disorder, severe, dependence (H)  -     Drugs of Abuse Screen Urine (POC CUPS) POCT; Standing  -     buprenorphine HCl-naloxone HCl (SUBOXONE) 8-2 MG per film; Place 1 Film under the tongue 3 times daily Not to exceed 20 mg per day  Primary osteoarthritis of lumbar spine  Major depressive disorder in full remission, unspecified whether recurrent (H)    Orders Placed This Encounter   Medications     buprenorphine HCl-naloxone HCl (SUBOXONE) 8-2 MG per film     Sig: Place 1 Film under the tongue 3 times daily Not to exceed 20 mg per day     Dispense:  90 Film     Refill:  1       Problem list updated May 5, 2023   No problems updated.      May 5, 2023  - Left his job recently, adding to stress. Feeling increased stress recently related to negative attitudes toward others  - Long conversation today about how to engage more with others. James notes he feels alone and wnts to engage more but has trouble trusting people and finding connection  - Pain increased lately. He agrees this may be related to worsening emotional distress  - Increase suboxone to TID dosing, 24mg daily, for pain mgmt  - Will work on tapering as he improves his approach to others and mood improves      Last encounter A/P  Jan 27, 2023  - Continue suboxone at 5x daily with recurrent back pain  - Stretches regularly, and attempting to be more physically active otherwise. Notes the spring/summer is better for him to increase activity  - Mood improved with new apartment  - Continues to attend meetings  - Continues to be abstinent from tobacco, using NRT and vaping occasionally      PDMP Review       Value Time User    State PDMP site checked  Yes 5/6/2023  1:05 PM Rober Mauricio MD            RTC  Return in about 2 months (around 7/5/2023) for video visit, please call to schedule, no  "Fridays.      Counseled the patient on the importance of having a recovery program in addition to medication to manage recovery.  Components include avoiding isolating, having willingness to change, avoiding triggers and managing cravings. Encouraged having some type of sober network and practicing honesty with trusted support person(s). Encouraged other services such as counseling, 12 step or other self-help organizations.      Opioid warning reviewed.  Risk of overdose following a period of abstinence due to decrease tolerance was discussed including risk of death.  Strongly recommended abstain from alcohol, benzodiazepines, THC, opioids and other drugs of abuse.  Increased risk of return to opioid use after use of these substances discussed.  Increased risk of overdose/death with use of other substances particularly benzodiazepines/alcohol reviewed.        SUBJECTIVE                                                    Edgar Williamson is a 43 year old male who presents to clinic today for follow up    Visit performed In Person, face-to-face        Recent HPI Details:  HPI Nov 30, 2022  - Landlord has been threatening to evict him, which has been frustrating but he feels confident he will not be evicted  - [place]o is sad about being declined from the apartments he is applying to, given his unlawful detainer on his record from 2015  - Pain has increased given these issues over the past 1-2 months. Using suboxone 5x/day  - Still going to the gym  - Quit smoking, avoiding fast food during the week  HPI Jan 27, 2023  - Moved out of his apartment, and since then his mood and balance has improved  - Sleep has been difficult, largely d/t back pain. This has been \"normal for a long time\"  - Stretches regularly, tries to stay active but this is difficult for him in the winter. Also does yoga practice. Considering acupuncture  - Continues to take 5 doses suboxone daliy for breakthrough pain most days  - Continues to remain " abstinent from cigarettes. Vaping at times, and uses nicotine lozenges as well  - Remains abstinent, no cravings      TODAY'S VISIT  HPI May 5, 2023  - Started physical therapy, which has helped some. But back pain remains consistent  - Difficulty relating to others  - Open to other ideas for recovery engagement  - No cravings  - Looking for a new job      OBJECTIVE  PHYSICAL EXAM:  /76   Pulse 69     GENERAL: healthy, alert and no distress  EYES: Eyes grossly normal to inspection, PERRL and conjunctivae and sclerae normal  RESP: No respiratory distress  MENTAL STATUS EXAM  Appearance/Behavior: No appearant distress  Speech: Normal  Mood/Affect: agitation  Insight: Adequate      PHQ-9 Score:       3/25/2022    10:56 AM 9/28/2022     2:23 PM 1/27/2023    12:32 PM   PHQ   PHQ-9 Total Score 0 0 0   Q9: Thoughts of better off dead/self-harm past 2 weeks Not at all Not at all Not at all       JULIETH-7 Score:      11/30/2021     1:33 PM 1/7/2022    10:36 AM 1/28/2022    10:55 AM   JULIETH-7 SCORE   Total Score 0 (minimal anxiety) 0 (minimal anxiety) 0 (minimal anxiety)   Total Score 0 0 0       LABS (may not contain today's labs)                                                        Today's lab data  Results for orders placed or performed in visit on 05/05/23   Drugs of Abuse Screen Urine (POC CUPS) POCT     Status: Abnormal   Result Value Ref Range    POCT Kit Lot Number [id number]     POCT Kit Expiration Date 2024-06-08     Temperature Urine POCT 92 F 90 F, 92 F, 94 F, 96 F, 98 F, 100 F    Specific [place] POCT 1.015 1.005, 1.015, 1.025    pH Qual Urine POCT 5 pH 4 pH, 5 pH, 7 pH, 9 pH    Creatinine Qual Urine POCT 100 mg/dL 20 mg/dL, 50 mg/dL, 100 mg/dL, 200 mg/dL    Internal QC Qual Urine POCT Valid Valid    Amphetamine Qual Urine POCT Negative Negative    Barbiturate Qual Urine POCT Negative Negative    Buprenorphine Qual Urine POCT Screen Positive (A) Negative    Benzodiazepine Qual Urine POCT Negative Negative     Cocaine Qual Urine POCT Negative Negative    Methamphetamine Qual Urine POCT Negative Negative    MDMA Qual Urine POCT Negative Negative    Methadone Qual Urine POCT Negative Negative    Opiate Qual Urine POCT Negative Negative    Oxycodone Qual Urine POCT Negative Negative    Phencyclidine Qual Urine POCT Negative Negative    THC Qual Urine POCT Screen Positive (A) Negative           HISTORY                                                    Problem list reviewed & adjusted, as indicated.  Patient Active Problem List   Diagnosis     CARDIOVASCULAR SCREENING; LDL GOAL LESS THAN 160     Moderate major depression (H)     Generalized anxiety disorder     Benign meningioma of brain (H)     Primary osteoarthritis of lumbar spine     Strain of lumbar region, initial encounter     Trichiasis of right lower eyelid     Stasis dermatitis of both legs     Seasonal allergies     Poisoning by benzodiazepines, undetermined, initial encounter     Nodule of right lung     Lower urinary tract infectious disease     Leukocytosis     Kidney stone     Eyelid laceration, right, initial encounter     Alcohol abuse     Controlled substance agreement signed     History of hepatitis C virus infection     Opioid use disorder, severe, dependence (H) with chronic pain     Tobacco dependence     High risk medication use     F11.2 - Continuous opioid dependence (H)         MEDICATION LIST (prior to visit)  gabapentin (NEURONTIN) 600 MG tablet, Take 1 tablet (600 mg) by mouth 2 times daily  ibuprofen (ADVIL/MOTRIN) 800 MG tablet, Take 1 tablet (800 mg) by mouth every 8 hours as needed for moderate pain  Lidocaine (LIDOCARE) 4 % Patch, Place 1 patch onto the skin every 24 hours To prevent lidocaine toxicity, patient should be patch free for 12 hrs daily.  nicotine (NICORETTE) 4 MG lozenge, PLACE 1 LOZENGE(4 MG) BY MOUTH INSIDE CHEEK EVERY HOUR AS NEEDED FOR SMOKING CESSATION  zolpidem (AMBIEN) 5 MG tablet, Take 1 tablet (5 mg) by mouth nightly as  needed for sleep    No current facility-administered medications on file prior to visit.      MEDICATION LIST (after visit)  Current Outpatient Medications   Medication     buprenorphine HCl-naloxone HCl (SUBOXONE) 8-2 MG per film     gabapentin (NEURONTIN) 600 MG tablet     ibuprofen (ADVIL/MOTRIN) 800 MG tablet     Lidocaine (LIDOCARE) 4 % Patch     nicotine (NICORETTE) 4 MG lozenge     zolpidem (AMBIEN) 5 MG tablet     No current facility-administered medications for this visit.         Allergies   Allergen Reactions     Diphenhydramine Other (See Comments)     Restless Legs/Feet  Other reaction(s): Restless Legs/Feet  Restless legs  Other reaction(s): Restless Legs/Feet       Methadone Other (See Comments)     Had terrible itching and redness and was pulled off methadone     Mirtazapine Other (See Comments)     Restless Legs/Feet  Other reaction(s): Restless Legs/Feet  Restless legs.  Other reaction(s): Restless Legs/Feet       Seasonal Allergies      Trazodone Other (See Comments) and Unknown     Behavioral Disturbances  Other reaction(s): Behavioral Disturbances  Behavorial disturbance.  - restless legs         Additional MDM Details:  none    Rober Mauricio MD  [facility] Addiction Medicine  [phone]

## 2023-05-05 NOTE — NURSING NOTE
[facility] - Addiction Medicine       Rooming information:    Point of care urine drug screen positive for:  Lab Results   Component Value Date    BUP Screen Positive (A) 05/05/2023    BZO Negative 05/05/2023    BAR Negative 05/05/2023    DANE Negative 05/05/2023    MAMP Negative 05/05/2023    AMP Negative 05/05/2023    MDMA Negative 05/05/2023    MTD Negative 05/05/2023    FSQ637 Negative 05/05/2023    OXY Negative 05/05/2023    PCP Negative 05/05/2023    THC Screen Positive (A) 05/05/2023    TEMP 92 F 05/05/2023    SGPOCT 1.015 05/05/2023       *POC urine drug screen does not screen for Fentanyl    PHQ-9 Scores:       3/25/2022    10:56 AM 9/28/2022     2:23 PM 1/27/2023    12:32 PM   PHQ   PHQ-9 Total Score 0 0 0   Q9: Thoughts of better off dead/self-harm past 2 weeks Not at all Not at all Not at all     JULIETH-7 Scores:      11/30/2021     1:33 PM 1/7/2022    10:36 AM 1/28/2022    10:55 AM   JULIETH-7 SCORE   Total Score 0 (minimal anxiety) 0 (minimal anxiety) 0 (minimal anxiety)   Total Score 0 0 0       Any other recent substance use:     Denies    NICOTINE-Yes: vape and nicotine lozenges  If using nicotine, ready to quit? No    Side effects related to medications pt would like to discuss with provider (constipation, dry mouth, HA, GI upset, sedation?) No     Narcan currently available: Yes    Primary care provider: Charlee Hayes MD     Mental health provider: Denies (follow up on MH referral if needed)    Any housing, insurance deficits?: Denies    Contact information up to date? Yes    3rd Party Involvement Not at this time (please obtain JARET if pt would like to include)      Digna Lackey RN  May 5, 2023  4:26 PM

## 2023-05-17 ENCOUNTER — TELEPHONE (OUTPATIENT)
Dept: ADDICTION MEDICINE | Facility: CLINIC | Age: 43
End: 2023-05-17
Payer: COMMERCIAL

## 2023-05-17 NOTE — TELEPHONE ENCOUNTER
RN called [facility]'s pharmacy and verified that his Suboxone refill will be available as early as 5/18/2023. They agreed to prepare this for pick-up tomorrow.    RN notified patient via "[employer].     Aurea Alexis RN on 5/17/2023 at 9:34 AM

## 2023-05-17 NOTE — TELEPHONE ENCOUNTER
RN received the following staff message and directive:    Rober Mauricio MD  Persons, JENNY Ugalde; P Addiction Medicine Rn - [facility]  Please reach out to James's pharmacy and ensure his increased dose of Suboxone will be filled on time (likely due this weekend).     He indicated that his pharmacy has been questioning his medication and is concerned that may be biased against buprenorphine.     Thank you!     RN called [facility] store number [id number] in [place] and spoke with Lily the technician. She states the medication will be available to refill as early as tomorrow (3 days prior to actual fill date) and she will set this up to automatically be prepared. The pharmacy offered no concerns.     RN updated patient via [employer] and encouraged him to schedule a follow-up with Dr. Mauricio.    Aurea Alexis RN on 5/17/2023 at 9:36 AM

## 2023-06-08 NOTE — TELEPHONE ENCOUNTER
Codi Headley Afsaneh Shields RN       Reason for Call:  Other  Note    Detailed comments:  Need doctor's note for back so doesn't have to be on top bunk staying @ [facility].  Any questions please call patient.    Phone Number Patient can be reached at: Home number on file [phone] (home)    Best Time:  Anytime     Can we leave a detailed message on this number? YES    Call taken on 6/11/2019 at 2:31 PM by Kimberley Pearce

## 2023-07-27 ENCOUNTER — VIRTUAL VISIT (OUTPATIENT)
Dept: ADDICTION MEDICINE | Facility: CLINIC | Age: 43
End: 2023-07-27
Payer: COMMERCIAL

## 2023-07-27 DIAGNOSIS — F11.20 OPIOID USE DISORDER, SEVERE, DEPENDENCE (H): Primary | ICD-10-CM

## 2023-07-27 DIAGNOSIS — M47.816 PRIMARY OSTEOARTHRITIS OF LUMBAR SPINE: ICD-10-CM

## 2023-07-27 DIAGNOSIS — F43.20 ADJUSTMENT DISORDER, UNSPECIFIED TYPE: ICD-10-CM

## 2023-07-27 PROCEDURE — 99214 OFFICE O/P EST MOD 30 MIN: CPT | Mod: 95 | Performed by: FAMILY MEDICINE

## 2023-07-27 RX ORDER — BUPRENORPHINE AND NALOXONE 8; 2 MG/1; MG/1
1 FILM, SOLUBLE BUCCAL; SUBLINGUAL 3 TIMES DAILY
Qty: 90 FILM | Refills: 2 | Status: SHIPPED | OUTPATIENT
Start: 2023-07-27 | End: 2023-10-26

## 2023-07-27 ASSESSMENT — PATIENT HEALTH QUESTIONNAIRE - PHQ9
SUM OF ALL RESPONSES TO PHQ QUESTIONS 1-9: 1
10. IF YOU CHECKED OFF ANY PROBLEMS, HOW DIFFICULT HAVE THESE PROBLEMS MADE IT FOR YOU TO DO YOUR WORK, TAKE CARE OF THINGS AT HOME, OR GET ALONG WITH OTHER PEOPLE: NOT DIFFICULT AT ALL
SUM OF ALL RESPONSES TO PHQ QUESTIONS 1-9: 1

## 2023-07-27 ASSESSMENT — PAIN SCALES - GENERAL: PAINLEVEL: SEVERE PAIN (6)

## 2023-07-27 NOTE — PATIENT INSTRUCTIONS
Patient Education   Addiction Medicine  What to Expect  Here's what to expect from our Addiction Medicine program.  About Addiction Medicine  Addiction Medicine clinics help you with substance use problems. You set your own goals. We try to help you reach your goals. Your care plan can include:  Medicine  Creating a recovery plan  Helping you find local resources  Helping with treatment options  Clinic phone number and addresses  Clinic Phone: [phone]  Mental Health and Addiction Clinic  [facility]  [address], [address]  [place] [address]  [facility]  [address], [address]  [place] [address]  Walk-in services  We offer walk-in care for patients at the Recovery Clinic. This is only for patients with Opioid Use Disorder (OUD). Anyone with OUD is welcome. Our providers will refer you to the Recovery Clinic if you're struggling to keep up with your medicines or appointments.  Recovery Clinic ([facility])  [address], [address]  [place] [address]  Phone: [phone]  The Recovery Clinic is open for walk-ins Monday to Friday 9 a.m. to 11:30 a.m. and 12:30 p.m. to 3 p.m.  How it works  Come to your visits every time. The treatment works better when you do.   You can have as many visits as you need. When you're better, we'll refer you back to being cared for by your family doctor.   If you need it, we'll send you to doctors, psychiatrists, therapists, and other providers. We focus on treating addiction. We don't treat other problems, like managing other medicines or non-addiction issues.  About visits  Urine drug testing  We'll often test your pee (urine) for drugs. This is the only way we can know for sure whether or not you're using drugs. It helps us treat you without judgement.   Suboxone (buprenorphine)  If you're taking buprenorphine, you'll have a lot of visits at first. If your problem is getting worse, or you're  "using substances, we may schedule you for extra visits.   Cancelling visits  If you can't come to your visit, please call us right away at [phone]. If you don't cancel at least 24 hours (1 full day) before your visit, that's \"late cancellation.\"  Being late to visits  If you come late, you may not be seen. This will count as a \"no-show.\"  Please call the clinic if you're running late. This will help us plan, but it doesn't mean you'll be seen.   Being late is:  More than 15 minutes late for a return visit.  More than 30 minutes late for your first visit.  If you cancel late or don't show up 2 times within 6 months, we may transfer you to another clinic.   Getting help between visits  If you need help between visits, you can call us Monday to Friday from 8 a.m. to 4:30 p.m. at [phone]. You can also send us a message on [employer].  Medicine refills  If you miss or cancel a visit, you can still ask for a refill. But we can only refill your medicines if you've made a new appointment.  Please call your pharmacy for medicine refills. If you have a question about your refill, call us at [phone].  It takes up to 2 business days to refill your drugs. Let us know 2 to 3 days before you run out. Don't call more than 1 week before you run out. That's too early.   Please make sure we have your right phone number.  If we have a problem with your refill, we'll call you. If we call you, please call us back right away. If you don't, you may not get your medicines quickly.   Call your pharmacy to find out if your medicines are ready.   Keep your medicines in a safe place. Keep them away from pets and children. If your medicines are lost or stolen, we usually don't replace them. We recommend you file a police report if your medicines are stolen. Your insurance may not pay for early refills, even if you have a prescription.  Forms  Please give us at least 3 business days to fill out any forms. Bring the forms to your " visits if you can. We may refer you to other members of your care team to complete the forms.   Emergency care   Call 911 or go to the nearest emergency room if your life or someone else's life is in danger.  Call 988 anytime for the Suicide and Crisis Lifeline.  If you need care when we're closed, call your family doctor to see if they can help. You can also go to urgent care or an emergency room. [facility] emergency rooms may be able to give you buprenorphine or other medicine refills.  Thank you for choosing us for your care.  For informational purposes only. Not to replace the advice of your health care provider. Copyright   2023 [facility]. All rights reserved. [employer] [id number] - REV 05/23.

## 2023-07-27 NOTE — NURSING NOTE
Is the patient currently in the state of [place]? YES    Visit mode:VIDEO    If the visit is dropped, the patient can be reconnected by: VIDEO VISIT: Text to cell phone: [phone]    Will anyone else be joining the visit? NO      How would you like to obtain your AVS? MyChart    Are changes needed to the allergy or medication list? NO    Reason for visit: RECHECK

## 2023-07-27 NOTE — PROGRESS NOTES
"Virtual Visit Details    Type of service:  Video Visit   Video Start Time: {video visit start/end time for provider to select:283465}  Video End Time:{video visit start/end time for provider to select:033701}    Originating Location (pt. Location): {video visit patient location:462332::\"Home\"}  {PROVIDER LOCATION On-site should be selected for visits conducted from your clinic location or adjoining [facility] hospital, academic office, or other nearby [facility] building. Off-site should be selected for all other provider locations, including home:932822}  Distant Location (provider location):  {virtual location provider:006306}  Platform used for Video Visit: {Virtual Visit Platforms:340615::\"[employer]\"}  "

## 2023-07-27 NOTE — PROGRESS NOTES
[facility] Medicine    A/P                                                    ASSESSMENT/PLAN  Diagnoses and all orders for this visit:  Opioid use disorder, severe, dependence (H)  -     buprenorphine HCl-naloxone HCl (SUBOXONE) 8-2 MG per film; Place 1 Film under the tongue 3 times daily Not to exceed 24 mg per day  Adjustment disorder, unspecified type  Primary osteoarthritis of lumbar spine    Orders Placed This Encounter   Medications    buprenorphine HCl-naloxone HCl (SUBOXONE) 8-2 MG per film     Sig: Place 1 Film under the tongue 3 times daily Not to exceed 24 mg per day     Dispense:  90 Film     Refill:  2       Problem list updated Jul 27, 2023   No problems updated.      Jul 27, 2023  - Suboxone consistent, no concerns or changes  - No use or cravings  - Restarted regular attendance at AA. Finding more balance and emotional regulation  - Hoping to build a support network with the people at this new meeting  - New job is working well for him. Spending less time standing, which helps his back  - Continue with 24mg daily. 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 Feeling increased stress recently related to negative attitudes toward others  - Long conversation today about how to engage more with others. James notes he feels alone and wnts to engage more but has trouble trusting people and finding connection  - Pain increased lately. He agrees this may be related to worsening emotional distress  - Increase suboxone to TID dosing, 24mg daily, for pain mgmt  - Will work on tapering as he improves his approach to others and mood improves      PDMP Review         Value Time User    State PDMP site checked  Yes 5/6/2023  1:05 PM Rober Mauricio MD              RTC  Return in about 3 months (around 10/27/2023) for in person, on/before approx date  "above.      Counseled the patient on the importance of having a recovery program in addition to medication to manage recovery.  Components include avoiding isolating, having willingness to change, avoiding triggers and managing cravings. Encouraged having some type of sober network and practicing honesty with trusted support person(s). Encouraged other services such as counseling, 12 step or other self-help organizations.      Opioid warning reviewed.  Risk of overdose following a period of abstinence due to decrease tolerance was discussed including risk of death.  Strongly recommended abstain from alcohol, benzodiazepines, THC, opioids and other drugs of abuse.  Increased risk of return to opioid use after use of these substances discussed.  Increased risk of overdose/death with use of other substances particularly benzodiazepines/alcohol reviewed.        SUBJECTIVE                                                    Edgar Williamson is a 43 year old male who presents to clinic today for follow up    Visit performed Virtual, via video    Video-Visit Details    Type of service:  Video Visit    Video Start Time: 9:47 AM  Video End Time: 9:57 AM    Originating Location (pt. Location): Home    Distant Location (provider location):  Home     Platform used for Video Visit: Jasmine        PHQ-9 Score:       9/28/2022     2:23 PM 1/27/2023    12:32 PM 7/27/2023     9:19 AM   PHQ   PHQ-9 Total Score 0 0 1   Q9: Thoughts of better off dead/self-harm past 2 weeks Not at all Not at all Not at all       JULIETH-7 Score:      11/30/2021     1:33 PM 1/7/2022    10:36 AM 1/28/2022    10:55 AM   JULIETH-7 SCORE   Total Score 0 (minimal anxiety) 0 (minimal anxiety) 0 (minimal anxiety)   Total Score 0 0 0           Recent HPI Details:  HPI Jan 27, 2023  - Moved out of his apartment, and since then his mood and balance has improved  - Sleep has been difficult, largely d/t back pain. This has been \"normal for a long time\"  - Stretches " "regularly, tries to stay active but this is difficult for him in the winter. Also does yoga practice. Considering acupuncture  - Continues to take 5 doses suboxone daliy for breakthrough pain most days  - Continues to remain abstinent from cigarettes. Vaping at times, and uses nicotine lozenges as well  - Remains abstinent, no cravings  HPI May 5, 2023  - Started physical therapy, which has helped some. But back pain remains consistent  - Difficulty relating to others  - Open to other ideas for recovery engagement  - No cravings  - Looking for a new job      TODAY'S VISIT  HPI Jul 27, 2023  - Started attending a new [employer]. Consistently attending Tuesday night meetings - tried out every night of the week before landing on this. Also attends Sundays at times  - Working consistently, has his kid on the weekends. No significant ups and downs or changes in life otherwise  - Mood in a much better place than last visit, which he attributes to better social connections  - Back pain \"about the same\". Exercising more often, playing basketball with his son. They go to the gym together   - No cravings or use  - Suboxone continues to work well      OBJECTIVE                                                    PHYSICAL EXAM:  There were no vitals taken for this visit.    GENERAL: healthy, alert and no distress  EYES: Eyes grossly normal to inspection, PERRL and conjunctivae and sclerae normal  RESP: No respiratory distress  MENTAL STATUS EXAM  Appearance/Behavior: No appearant distress  Speech: Normal  Mood/Affect: normal affect  Insight: Adequate    LAB  No results found for any visits on 07/27/23.      HISTORY                                                    Problem list reviewed & adjusted, as indicated.  Patient Active Problem List   Diagnosis    CARDIOVASCULAR SCREENING; LDL GOAL LESS THAN 160    Moderate major depression (H)    Generalized anxiety disorder    Benign meningioma of brain (H)    Primary " osteoarthritis of lumbar spine    Strain of lumbar region, initial encounter    Trichiasis of right lower eyelid    Stasis dermatitis of both legs    Seasonal allergies    Poisoning by benzodiazepines, undetermined, initial encounter    Nodule of right lung    Lower urinary tract infectious disease    Leukocytosis    Kidney stone    Eyelid laceration, right, initial encounter    Alcohol abuse    Controlled substance agreement signed    History of hepatitis C virus infection    Opioid use disorder, severe, dependence (H) with chronic pain    Tobacco dependence    High risk medication use    F11.2 - Continuous opioid dependence (H)         MEDICATION LIST (prior to visit)  gabapentin (NEURONTIN) 600 MG tablet, Take 1 tablet (600 mg) by mouth 2 times daily  ibuprofen (ADVIL/MOTRIN) 800 MG tablet, Take 1 tablet (800 mg) by mouth every 8 hours as needed for moderate pain  Lidocaine (LIDOCARE) 4 % Patch, Place 1 patch onto the skin every 24 hours To prevent lidocaine toxicity, patient should be patch free for 12 hrs daily.  nicotine (NICORETTE) 4 MG lozenge, PLACE 1 LOZENGE(4 MG) BY MOUTH INSIDE CHEEK EVERY HOUR AS NEEDED FOR SMOKING CESSATION  zolpidem (AMBIEN) 5 MG tablet, Take 1 tablet (5 mg) by mouth nightly as needed for sleep    No current facility-administered medications on file prior to visit.      MEDICATION LIST (after visit)  Current Outpatient Medications   Medication    buprenorphine HCl-naloxone HCl (SUBOXONE) 8-2 MG per film    gabapentin (NEURONTIN) 600 MG tablet    ibuprofen (ADVIL/MOTRIN) 800 MG tablet    Lidocaine (LIDOCARE) 4 % Patch    nicotine (NICORETTE) 4 MG lozenge    zolpidem (AMBIEN) 5 MG tablet     No current facility-administered medications for this visit.         Allergies   Allergen Reactions    Diphenhydramine Other (See Comments)     Restless Legs/Feet  Other reaction(s): Restless Legs/Feet  Restless legs  Other reaction(s): Restless Legs/Feet      Methadone Other (See Comments)     Had  terrible itching and redness and was pulled off methadone    Mirtazapine Other (See Comments)     Restless Legs/Feet  Other reaction(s): Restless Legs/Feet  Restless legs.  Other reaction(s): Restless Legs/Feet      Seasonal Allergies     Trazodone Other (See Comments) and Unknown     Behavioral Disturbances  Other reaction(s): Behavioral Disturbances  Behavorial disturbance.  - restless legs         Additional MDM Details:  none    Rober Mauricio MD  [facility] Medicine  [phone]

## 2023-10-24 ENCOUNTER — MYC MEDICAL ADVICE (OUTPATIENT)
Dept: ADDICTION MEDICINE | Facility: CLINIC | Age: 43
End: 2023-10-24
Payer: COMMERCIAL

## 2023-10-25 NOTE — CONFIDENTIAL NOTE
Called pt to let him know that per [facility], pt does have active health insurance through [employer], and she updated pt's insurance information in his chart.    Requested that pt call [employer] for any additional insurance information that they can provide to him. Also requested that patient bring along his insurance card to his appointment tomorrow if he has one and/or can find it.    Pt verbalized understanding and plans to attend his appointment tomorrow as scheduled.

## 2023-10-26 ENCOUNTER — OFFICE VISIT (OUTPATIENT)
Dept: ADDICTION MEDICINE | Facility: CLINIC | Age: 43
End: 2023-10-26
Payer: COMMERCIAL

## 2023-10-26 VITALS — SYSTOLIC BLOOD PRESSURE: 132 MMHG | HEART RATE: 66 BPM | DIASTOLIC BLOOD PRESSURE: 83 MMHG

## 2023-10-26 DIAGNOSIS — F17.200 TOBACCO DEPENDENCE: ICD-10-CM

## 2023-10-26 DIAGNOSIS — F32.5 MAJOR DEPRESSIVE DISORDER IN FULL REMISSION, UNSPECIFIED WHETHER RECURRENT (H): ICD-10-CM

## 2023-10-26 DIAGNOSIS — F11.20 OPIOID USE DISORDER, SEVERE, DEPENDENCE (H): Primary | ICD-10-CM

## 2023-10-26 LAB
AMPHETAMINE QUAL URINE POCT: NEGATIVE
BARBITURATE QUAL URINE POCT: NEGATIVE
BENZODIAZEPINE QUAL URINE POCT: NEGATIVE
BUPRENORPHINE QUAL URINE POCT: ABNORMAL
COCAINE QUAL URINE POCT: NEGATIVE
CREATININE QUAL URINE POCT: ABNORMAL
INTERNAL QC QUAL URINE POCT: ABNORMAL
MDMA QUAL URINE POCT: NEGATIVE
METHADONE QUAL URINE POCT: NEGATIVE
METHAMPHETAMINE QUAL URINE POCT: NEGATIVE
OPIATE QUAL URINE POCT: NEGATIVE
OXYCODONE QUAL URINE POCT: NEGATIVE
PH QUAL URINE POCT: ABNORMAL
PHENCYCLIDINE QUAL URINE POCT: NEGATIVE
POCT KIT EXPIRATION DATE: ABNORMAL
POCT KIT LOT NUMBER: ABNORMAL
SPECIFIC GRAVITY POCT: 1.02
TEMPERATURE URINE POCT: ABNORMAL
THC QUAL URINE POCT: ABNORMAL

## 2023-10-26 PROCEDURE — 80305 DRUG TEST PRSMV DIR OPT OBS: CPT | Performed by: FAMILY MEDICINE

## 2023-10-26 PROCEDURE — 99214 OFFICE O/P EST MOD 30 MIN: CPT | Performed by: FAMILY MEDICINE

## 2023-10-26 RX ORDER — BUPRENORPHINE AND NALOXONE 8; 2 MG/1; MG/1
1 FILM, SOLUBLE BUCCAL; SUBLINGUAL 3 TIMES DAILY
Qty: 90 FILM | Refills: 2 | Status: SHIPPED | OUTPATIENT
Start: 2023-10-26 | End: 2024-01-12

## 2023-10-26 NOTE — PROGRESS NOTES
[facility] - Addiction Medicine       Rooming information:    Point of care urine drug screen positive for:  Lab Results   Component Value Date    BUP Screen Positive (A) 10/26/2023    BZO Negative 10/26/2023    BAR Negative 10/26/2023    DANE Negative 10/26/2023    MAMP Negative 10/26/2023    AMP Negative 10/26/2023    MDMA Negative 10/26/2023    MTD Negative 10/26/2023    ZWN165 Negative 10/26/2023    OXY Negative 10/26/2023    PCP Negative 10/26/2023    THC Screen Positive (A) 10/26/2023    TEMP 94 F 10/26/2023    SGPOCT 1.025 10/26/2023       *POC urine drug screen does not screen for Fentanyl    PHQ-9 Scores:       9/28/2022     2:23 PM 1/27/2023    12:32 PM 7/27/2023     9:19 AM   PHQ   PHQ-9 Total Score 0 0 1   Q9: Thoughts of better off dead/self-harm past 2 weeks Not at all Not at all Not at all     JULIETH-7 Scores:      11/30/2021     1:33 PM 1/7/2022    10:36 AM 1/28/2022    10:55 AM   JULIETH-7 SCORE   Total Score 0 (minimal anxiety) 0 (minimal anxiety) 0 (minimal anxiety)   Total Score 0 0 0       Any other recent substance use:     Cannabis     NICOTINE-Yes: vape, nicotine lozenges   If using nicotine, ready to quit? No    Side effects related to medications pt would like to discuss with provider (constipation, dry mouth, HA, GI upset, sedation?) No     Narcan currently available: Yes    Primary care provider: Charlee Hayes MD     Mental health provider: denies  (follow up on MH referral if needed)    Any housing, insurance deficits?: denies     Contact information up to date? Yes     3rd Party Involvement not at this time (please obtain JARET if pt would like to include)      Pauline Guillory LPN  October 26, 2023  10:39 AM

## 2023-10-26 NOTE — PROGRESS NOTES
[facility] Medicine    A/P                                                    ASSESSMENT/PLAN  Diagnoses and all orders for this visit:  Opioid use disorder, severe, dependence (H)  -     Drugs of Abuse Screen Urine (POC CUPS) POCT  -     buprenorphine HCl-naloxone HCl (SUBOXONE) 8-2 MG per film; Place 1 Film under the tongue 3 times daily Not to exceed 24 mg per day  Major depressive disorder in full remission, unspecified whether recurrent (H24)  Tobacco dependence    Orders Placed This Encounter   Medications    buprenorphine HCl-naloxone HCl (SUBOXONE) 8-2 MG per film     Sig: Place 1 Film under the tongue 3 times daily Not to exceed 24 mg per day     Dispense:  90 Film     Refill:  2       Problem list updated Oct 26, 2023   No problems updated.      Oct 26, 2023  - Continue suboxone, no changes  - No cravings, no use  - No tobacco for nearly a year. Continues taking 3-4 lozenges NRT daily. Vaping occasionally  - Started at the gym recently, plans to do this consistently. Helps his well-being mentally and physically  - Back pain persisting. Will continue 24mg suboxone for now, consider tapering later this winter if pain improving-  - Mood positive, stable. Attending AA meetings weekly, which helps him stay positive      Last encounter A/P  Jul 27, 2023  - Suboxone consistent, no concerns or changes  - No use or cravings  - Restarted regular attendance at AA. Finding more balance and emotional regulation  - Hoping to build a support network with the people at this new meeting  - New job is working well for him. Spending less time standing, which helps his back  - Continue with 24mg daily. 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      Counseled  "the patient on the importance of having a recovery program in addition to medication to manage recovery.  Components include avoiding isolating, having willingness to change, avoiding triggers and managing cravings. Encouraged having some type of sober network and practicing honesty with trusted support person(s). Encouraged other services such as counseling, 12 step or other self-help organizations.      Opioid warning reviewed.  Risk of overdose following a period of abstinence due to decrease tolerance was discussed including risk of death.  Strongly recommended abstain from alcohol, benzodiazepines, THC, opioids and other drugs of abuse.  Increased risk of return to opioid use after use of these substances discussed.  Increased risk of overdose/death with use of other substances particularly benzodiazepines/alcohol reviewed.        SUBJECTIVE                                                    Edgar Williamson is a 43 year old male who presents to clinic today for follow up    Visit performed In Person, face-to-face        Recent HPI Details:  HPI May 5, 2023  - Started physical therapy, which has helped some. But back pain remains consistent  - Difficulty relating to others  - Open to other ideas for recovery engagement  - No cravings  - Looking for a new job  HPI Jul 27, 2023  - Started attending a new [facility] in [employer]. Consistently attending Tuesday night meetings - tried out every night of the week before landing on this. Also attends Sundays at times  - Working consistently, has his kid on the weekends. No significant ups and downs or changes in life otherwise  - Mood in a much better place than last visit, which he attributes to better social connections  - Back pain \"about the same\". Exercising more often, playing basketball with his son. They go to the gym together   - No cravings or use  - Suboxone continues to work well      TODAY'S VISIT  HPI Oct 26, 2023  - Going to the gym daily in the past " "week  - Wants to stop \"playing it safe\" and looking forward to getting more out of his life  - Less mental chatter with making positive actions  - No cravings or use  - Attending meetings weekly, which has been helpful  - Less physical strain at work, more programming  - Still has pain concerns, no significant changes, but seeing a  next week  - No cigarettes for nearly a year. Still vaping at times. Ongoing use of lonzenges, roughly 3-4 per day      OBJECTIVE  PHYSICAL EXAM:  /83 (BP Location: Right arm, Patient Position: Sitting, Cuff Size: Adult Regular)   Pulse 66     GENERAL: healthy, alert and no distress  EYES: Eyes grossly normal to inspection, PERRL and conjunctivae and sclerae normal  RESP: No respiratory distress  MENTAL STATUS EXAM  Appearance/Behavior: No appearant distress  Speech: Normal  Mood/Affect: normal affect  Insight: Adequate      PHQ-9 Score:       9/28/2022     2:23 PM 1/27/2023    12:32 PM 7/27/2023     9:19 AM   PHQ   PHQ-9 Total Score 0 0 1   Q9: Thoughts of better off dead/self-harm past 2 weeks Not at all Not at all Not at all       JULIETH-7 Score:      11/30/2021     1:33 PM 1/7/2022    10:36 AM 1/28/2022    10:55 AM   JULIETH-7 SCORE   Total Score 0 (minimal anxiety) 0 (minimal anxiety) 0 (minimal anxiety)   Total Score 0 0 0       LABS (may not contain today's labs)                                                      Today's lab data  Results for orders placed or performed in visit on 10/26/23   Drugs of Abuse Screen Urine (POC CUPS) POCT     Status: Abnormal   Result Value Ref Range    POCT Kit Lot Number [id number]     POCT Kit Expiration Date 11/20/2024     Temperature Urine POCT 94 F 90 F, 92 F, 94 F, 96 F, 98 F, 100 F    Specific [place] POCT 1.025 1.005, 1.015, 1.025    pH Qual Urine POCT 7 pH 4 pH, 5 pH, 7 pH, 9 pH    Creatinine Qual Urine POCT 100 mg/dL 20 mg/dL, 50 mg/dL, 100 mg/dL, 200 mg/dL    Internal QC Qual Urine POCT Valid Valid    Amphetamine Qual Urine POCT " Negative Negative    Barbiturate Qual Urine POCT Negative Negative    Buprenorphine Qual Urine POCT Screen Positive (A) Negative    Benzodiazepine Qual Urine POCT Negative Negative    Cocaine Qual Urine POCT Negative Negative    Methamphetamine Qual Urine POCT Negative Negative    MDMA Qual Urine POCT Negative Negative    Methadone Qual Urine POCT Negative Negative    Opiate Qual Urine POCT Negative Negative    Oxycodone Qual Urine POCT Negative Negative    Phencyclidine Qual Urine POCT Negative Negative    THC Qual Urine POCT Screen Positive (A) Negative           HISTORY                                                    Problem list reviewed & adjusted, as indicated.  Patient Active Problem List   Diagnosis    CARDIOVASCULAR SCREENING; LDL GOAL LESS THAN 160    Moderate major depression (H)    Generalized anxiety disorder    Benign meningioma of brain (H)    Primary osteoarthritis of lumbar spine    Strain of lumbar region, initial encounter    Trichiasis of right lower eyelid    Stasis dermatitis of both legs    Seasonal allergies    Poisoning by benzodiazepines, undetermined, initial encounter    Nodule of right lung    Lower urinary tract infectious disease    Leukocytosis    Kidney stone    Eyelid laceration, right, initial encounter    Alcohol abuse    Controlled substance agreement signed    History of hepatitis C virus infection    Opioid use disorder, severe, dependence (H) with chronic pain    Tobacco dependence    High risk medication use    F11.2 - Continuous opioid dependence (H)         MEDICATION LIST (prior to visit)  gabapentin (NEURONTIN) 600 MG tablet, Take 1 tablet (600 mg) by mouth 2 times daily  ibuprofen (ADVIL/MOTRIN) 800 MG tablet, Take 1 tablet (800 mg) by mouth every 8 hours as needed for moderate pain  Lidocaine (LIDOCARE) 4 % Patch, Place 1 patch onto the skin every 24 hours To prevent lidocaine toxicity, patient should be patch free for 12 hrs daily.  nicotine (NICORETTE) 4 MG  lozenge, PLACE 1 LOZENGE(4 MG) BY MOUTH INSIDE CHEEK EVERY HOUR AS NEEDED FOR SMOKING CESSATION    No current facility-administered medications on file prior to visit.      MEDICATION LIST (after visit)  Current Outpatient Medications   Medication    buprenorphine HCl-naloxone HCl (SUBOXONE) 8-2 MG per film    gabapentin (NEURONTIN) 600 MG tablet    ibuprofen (ADVIL/MOTRIN) 800 MG tablet    Lidocaine (LIDOCARE) 4 % Patch    nicotine (NICORETTE) 4 MG lozenge     No current facility-administered medications for this visit.         Allergies   Allergen Reactions    Diphenhydramine Other (See Comments)     Restless Legs/Feet  Other reaction(s): Restless Legs/Feet  Restless legs  Other reaction(s): Restless Legs/Feet      Methadone Other (See Comments)     Had terrible itching and redness and was pulled off methadone    Mirtazapine Other (See Comments)     Restless Legs/Feet  Other reaction(s): Restless Legs/Feet  Restless legs.  Other reaction(s): Restless Legs/Feet      Seasonal Allergies     Trazodone Other (See Comments) and Unknown     Behavioral Disturbances  Other reaction(s): Behavioral Disturbances  Behavorial disturbance.  - restless legs         Additional MDM Details:  none    Rober Mauricio MD  [facility] Medicine  [phone]

## 2023-10-26 NOTE — PATIENT INSTRUCTIONS
Patient Education   Addiction Medicine  What to Expect  Here's what to expect from our Addiction Medicine program.  About Addiction Medicine  Addiction Medicine clinics help you with substance use problems. You set your own goals. We try to help you reach your goals. Your care plan can include:  Medicine  Creating a recovery plan  Helping you find local resources  Helping with treatment options  Clinic phone number and addresses  Clinic Phone: [phone]  Mental Health and Addiction Clinic  [facility]  [address], [address]  [place] [address]  [facility]  [address], [address]  [place] [address]  Walk-in services  We offer walk-in care for patients at the Recovery Clinic. This is only for patients with Opioid Use Disorder (OUD). Anyone with OUD is welcome. Our providers will refer you to the Recovery Clinic if you're struggling to keep up with your medicines or appointments.  Recovery Clinic ([facility])  [address], [address]  [place] [address]  Phone: [phone]  The Recovery Clinic is open for walk-ins Monday to Friday 9 a.m. to 11:30 a.m. and 12:30 p.m. to 3 p.m.  How it works  Come to your visits every time. The treatment works better when you do.   You can have as many visits as you need. When you're better, we'll refer you back to being cared for by your family doctor.   If you need it, we'll send you to doctors, psychiatrists, therapists, and other providers. We focus on treating addiction. We don't treat other problems, like managing other medicines or non-addiction issues.  About visits  Urine drug testing  We'll often test your pee (urine) for drugs. This is the only way we can know for sure whether or not you're using drugs. It helps us treat you without judgement.   Suboxone (buprenorphine)  If you're taking buprenorphine, you'll have a lot of visits at first. If your problem is getting worse, or you're  "using substances, we may schedule you for extra visits.   Cancelling visits  If you can't come to your visit, please call us right away at [phone]. If you don't cancel at least 24 hours (1 full day) before your visit, that's \"late cancellation.\"  Being late to visits  If you come late, you may not be seen. This will count as a \"no-show.\"  Please call the clinic if you're running late. This will help us plan, but it doesn't mean you'll be seen.   Being late is:  More than 15 minutes late for a return visit.  More than 30 minutes late for your first visit.  If you cancel late or don't show up 2 times within 6 months, we may transfer you to another clinic.   Getting help between visits  If you need help between visits, you can call us Monday to Friday from 8 a.m. to 4:30 p.m. at [phone]. You can also send us a message on [employer].  Medicine refills  If you miss or cancel a visit, you can still ask for a refill. But we can only refill your medicines if you've made a new appointment.  Please call your pharmacy for medicine refills. If you have a question about your refill, call us at [phone].  It takes up to 2 business days to refill your drugs. Let us know 2 to 3 days before you run out. Don't call more than 1 week before you run out. That's too early.   Please make sure we have your right phone number.  If we have a problem with your refill, we'll call you. If we call you, please call us back right away. If you don't, you may not get your medicines quickly.   Call your pharmacy to find out if your medicines are ready.   Keep your medicines in a safe place. Keep them away from pets and children. If your medicines are lost or stolen, we usually don't replace them. We recommend you file a police report if your medicines are stolen. Your insurance may not pay for early refills, even if you have a prescription.  Forms  Please give us at least 3 business days to fill out any forms. Bring the forms to your " visits if you can. We may refer you to other members of your care team to complete the forms.   Emergency care   Call 911 or go to the nearest emergency room if your life or someone else's life is in danger.  Call 988 anytime for the Suicide and Crisis Lifeline.  If you need care when we're closed, call your family doctor to see if they can help. You can also go to urgent care or an emergency room. [facility] emergency rooms may be able to give you buprenorphine or other medicine refills.  Thank you for choosing us for your care.  For informational purposes only. Not to replace the advice of your health care provider. Copyright   2023 [facility]. All rights reserved. [employer] 236921 - REV 05/23.

## 2023-11-21 ENCOUNTER — MYC MEDICAL ADVICE (OUTPATIENT)
Dept: ADDICTION MEDICINE | Facility: CLINIC | Age: 43
End: 2023-11-21
Payer: COMMERCIAL

## 2023-11-21 DIAGNOSIS — F11.20 OPIOID USE DISORDER, SEVERE, DEPENDENCE (H): ICD-10-CM

## 2023-11-21 NOTE — TELEPHONE ENCOUNTER
Patient is needing to change pharmacies related to a closed location. Via [facility], RN requested patient call around to locate another pharmacy.     May need to cancel script to [facility] and send remaining refills to the new pharmacy.     Awaiting response from patient. Routing to provider for review.     Aurea Argueta RN on 11/21/2023 at 2:53 PM

## 2023-11-27 RX ORDER — BUPRENORPHINE 8 MG/1
8 TABLET SUBLINGUAL 3 TIMES DAILY
Qty: 90 TABLET | Refills: 1 | Status: SHIPPED | OUTPATIENT
Start: 2023-11-27 | End: 2024-03-01

## 2024-03-01 ENCOUNTER — MYC MEDICAL ADVICE (OUTPATIENT)
Dept: ADDICTION MEDICINE | Facility: CLINIC | Age: 44
End: 2024-03-01
Payer: COMMERCIAL

## 2024-03-01 DIAGNOSIS — F11.20 OPIOID USE DISORDER, SEVERE, DEPENDENCE (H): ICD-10-CM

## 2024-03-01 RX ORDER — BUPRENORPHINE 8 MG/1
4 TABLET SUBLINGUAL DAILY
Qty: 7 TABLET | Refills: 0 | Status: SHIPPED | OUTPATIENT
Start: 2024-03-01 | End: 2024-03-04

## 2024-03-01 NOTE — TELEPHONE ENCOUNTER
Reviewed MyChart encounter from 1/16/24, follow up was recommended at that time. Again, I would encourage the patient to schedule a follow up visit so we can facilitate and safe and effective plan going forward. I will send a bridge prescription for the patient to resume buprenorphine 4 mg daily to manage current cravings and withdrawal symptoms. Routing to Dr. Mauricio as well for his review next week.     1. Opioid use disorder, severe, dependence (H)  - buprenorphine (SUBUTEX) 8 MG SUBL sublingual tablet; Place 0.5 tablets (4 mg) under the tongue daily  Dispense: 7 tablet; Refill: 0    DARELL Hoffman CNP on 3/1/2024 at 10:37 AM

## 2024-03-01 NOTE — TELEPHONE ENCOUNTER
"1) Per provider: \"Reviewed "[employer]hart encounter from 1/16/24, follow up was recommended at that time. Again, I would encourage the patient to schedule a follow up visit so we can facilitate and safe and effective plan going forward. I will send a bridge prescription for the patient to resume buprenorphine 4 mg daily to manage current cravings and withdrawal symptoms. Routing to Dr. Mauricio as well for his review next week\"     1. Opioid use disorder, severe, dependence (H)  - buprenorphine (SUBUTEX) 8 MG SUBL sublingual tablet; Place 0.5 tablets (4 mg) under the tongue daily  Dispense: 7 tablet; Refill: 0     2) [employer] message sent to patient   "

## 2024-03-01 NOTE — TELEPHONE ENCOUNTER
1) Reviewed patient's [facility] message. He took his last Buprenorphine 4 days ago per report. Having withdrawal symptoms and cravings.     2) Routing to covering provider to advise.       ASHER ANDERSON RN on 3/1/2024 at 10:05 AM

## 2024-03-04 ENCOUNTER — TELEPHONE (OUTPATIENT)
Dept: ADDICTION MEDICINE | Facility: CLINIC | Age: 44
End: 2024-03-04
Payer: COMMERCIAL

## 2024-03-04 RX ORDER — BUPRENORPHINE 8 MG/1
8 TABLET SUBLINGUAL DAILY
Qty: 14 TABLET | Refills: 0 | Status: SHIPPED | OUTPATIENT
Start: 2024-03-04 | End: 2024-03-20

## 2024-03-04 NOTE — TELEPHONE ENCOUNTER
Reason for call:  Medication   If this is a refill request, has the caller requested the refill from the pharmacy already? No  Will the patient be using a [facility]? No  Name of the pharmacy and phone number for the current request:     [id number] - [place], [place] - [address]     Name of the medication requested:   buprenorphine (SUBUTEX) 8 MG SUBL     Other request: client cesar next avail F/U 3/20/24  Will need bridge to that appt     Phone number to reach patient:  Home number on file [phone] (home)    Best Time:  any    Can we leave a detailed message on this number?  YES    Travel screening: Not Applicable

## 2024-03-08 ENCOUNTER — MYC MEDICAL ADVICE (OUTPATIENT)
Dept: FAMILY MEDICINE | Facility: CLINIC | Age: 44
End: 2024-03-08
Payer: COMMERCIAL

## 2024-03-08 DIAGNOSIS — M47.816 PRIMARY OSTEOARTHRITIS OF LUMBAR SPINE: ICD-10-CM

## 2024-03-08 RX ORDER — GABAPENTIN 600 MG/1
600 TABLET ORAL 2 TIMES DAILY
Qty: 180 TABLET | Refills: 0 | Status: SHIPPED | OUTPATIENT
Start: 2024-03-08 | End: 2024-03-12

## 2024-03-08 NOTE — TELEPHONE ENCOUNTER
Called and spoke with patient to discuss concerns.    Scheduled appointment for 3/12/24.    Christine M Klisch, RN

## 2024-03-12 ENCOUNTER — OFFICE VISIT (OUTPATIENT)
Dept: FAMILY MEDICINE | Facility: CLINIC | Age: 44
End: 2024-03-12
Payer: COMMERCIAL

## 2024-03-12 VITALS
HEART RATE: 56 BPM | OXYGEN SATURATION: 100 % | WEIGHT: 186 LBS | TEMPERATURE: 98 F | BODY MASS INDEX: 26.04 KG/M2 | RESPIRATION RATE: 16 BRPM | DIASTOLIC BLOOD PRESSURE: 82 MMHG | HEIGHT: 71 IN | SYSTOLIC BLOOD PRESSURE: 137 MMHG

## 2024-03-12 DIAGNOSIS — F51.01 PRIMARY INSOMNIA: ICD-10-CM

## 2024-03-12 DIAGNOSIS — S39.012A STRAIN OF LUMBAR REGION, INITIAL ENCOUNTER: ICD-10-CM

## 2024-03-12 DIAGNOSIS — F17.200 TOBACCO DEPENDENCE: ICD-10-CM

## 2024-03-12 DIAGNOSIS — M47.816 PRIMARY OSTEOARTHRITIS OF LUMBAR SPINE: Primary | ICD-10-CM

## 2024-03-12 LAB
ALBUMIN UR-MCNC: 30 MG/DL
APPEARANCE UR: CLEAR
BACTERIA #/AREA URNS HPF: NORMAL /HPF
BILIRUB UR QL STRIP: NEGATIVE
COLOR UR AUTO: YELLOW
GLUCOSE UR STRIP-MCNC: NEGATIVE MG/DL
HGB UR QL STRIP: NEGATIVE
KETONES UR STRIP-MCNC: NEGATIVE MG/DL
LEUKOCYTE ESTERASE UR QL STRIP: NEGATIVE
NITRATE UR QL: NEGATIVE
PH UR STRIP: 7 [PH] (ref 5–7)
RBC #/AREA URNS AUTO: NORMAL /HPF
SP GR UR STRIP: >=1.03 (ref 1–1.03)
UROBILINOGEN UR STRIP-ACNC: 2 E.U./DL
WBC #/AREA URNS AUTO: NORMAL /HPF

## 2024-03-12 PROCEDURE — 81001 URINALYSIS AUTO W/SCOPE: CPT | Performed by: FAMILY MEDICINE

## 2024-03-12 PROCEDURE — 99214 OFFICE O/P EST MOD 30 MIN: CPT | Performed by: FAMILY MEDICINE

## 2024-03-12 RX ORDER — IBUPROFEN 800 MG/1
800 TABLET, FILM COATED ORAL EVERY 8 HOURS PRN
Qty: 360 TABLET | Refills: 3 | Status: SHIPPED | OUTPATIENT
Start: 2024-03-12

## 2024-03-12 RX ORDER — ZOLPIDEM TARTRATE 5 MG/1
5 TABLET ORAL
Qty: 30 TABLET | Refills: 0 | Status: SHIPPED | OUTPATIENT
Start: 2024-03-12 | End: 2024-07-16

## 2024-03-12 RX ORDER — GABAPENTIN 600 MG/1
600 TABLET ORAL 2 TIMES DAILY
Qty: 180 TABLET | Refills: 3 | Status: SHIPPED | OUTPATIENT
Start: 2024-03-12 | End: 2024-07-16

## 2024-03-12 ASSESSMENT — ANXIETY QUESTIONNAIRES
1. FEELING NERVOUS, ANXIOUS, OR ON EDGE: NOT AT ALL
7. FEELING AFRAID AS IF SOMETHING AWFUL MIGHT HAPPEN: SEVERAL DAYS
GAD7 TOTAL SCORE: 1
7. FEELING AFRAID AS IF SOMETHING AWFUL MIGHT HAPPEN: SEVERAL DAYS
5. BEING SO RESTLESS THAT IT IS HARD TO SIT STILL: NOT AT ALL
3. WORRYING TOO MUCH ABOUT DIFFERENT THINGS: NOT AT ALL
6. BECOMING EASILY ANNOYED OR IRRITABLE: NOT AT ALL
IF YOU CHECKED OFF ANY PROBLEMS ON THIS QUESTIONNAIRE, HOW DIFFICULT HAVE THESE PROBLEMS MADE IT FOR YOU TO DO YOUR WORK, TAKE CARE OF THINGS AT HOME, OR GET ALONG WITH OTHER PEOPLE: NOT DIFFICULT AT ALL
8. IF YOU CHECKED OFF ANY PROBLEMS, HOW DIFFICULT HAVE THESE MADE IT FOR YOU TO DO YOUR WORK, TAKE CARE OF THINGS AT HOME, OR GET ALONG WITH OTHER PEOPLE?: NOT DIFFICULT AT ALL
4. TROUBLE RELAXING: NOT AT ALL
GAD7 TOTAL SCORE: 1
GAD7 TOTAL SCORE: 1
2. NOT BEING ABLE TO STOP OR CONTROL WORRYING: NOT AT ALL

## 2024-03-12 ASSESSMENT — PATIENT HEALTH QUESTIONNAIRE - PHQ9
SUM OF ALL RESPONSES TO PHQ QUESTIONS 1-9: 3
10. IF YOU CHECKED OFF ANY PROBLEMS, HOW DIFFICULT HAVE THESE PROBLEMS MADE IT FOR YOU TO DO YOUR WORK, TAKE CARE OF THINGS AT HOME, OR GET ALONG WITH OTHER PEOPLE: SOMEWHAT DIFFICULT
SUM OF ALL RESPONSES TO PHQ QUESTIONS 1-9: 3

## 2024-03-12 ASSESSMENT — PAIN SCALES - GENERAL: PAINLEVEL: NO PAIN (0)

## 2024-03-12 NOTE — PROGRESS NOTES
"  Assessment & Plan     Primary osteoarthritis of lumbar spine    - gabapentin (NEURONTIN) 600 MG tablet; Take 1 tablet (600 mg) by mouth 2 times daily  - ibuprofen (ADVIL/MOTRIN) 800 MG tablet; Take 1 tablet (800 mg) by mouth every 8 hours as needed for moderate pain    Strain of lumbar region, initial encounter  RICE therapy.  Discussed with patient likely muscle strain,  Advised with supportive care, home daily stretching exercise.  Continue gabapentin and ibuprofen as needed.    Negative for UA.  - UA Macroscopic with reflex to Microscopic and Culture - Lab Collect; Future  - UA Macroscopic with reflex to Microscopic and Culture - Lab Collect  - UA Microscopic with Reflex to Culture    Primary insomnia  Use as needed  - zolpidem (AMBIEN) 5 MG tablet; Take 1 tablet (5 mg) by mouth nightly as needed for sleep    Tobacco dependence  Continue to use as needed, in full remission since 11/2022  - nicotine (NICORETTE) 4 MG lozenge; PLACE 1 LOZENGE(4 MG) BY MOUTH INSIDE CHEEK EVERY HOUR AS NEEDED FOR SMOKING CESSATION      BMI  Estimated body mass index is 25.75 kg/m  as calculated from the following:    Height as of this encounter: 1.81 m (5' 11.26\").    Weight as of this encounter: 84.4 kg (186 lb).   Weight management plan: Discussed healthy diet and exercise guidelines      Work on weight loss  Regular exercise    Akiok Ramirez is a 43 year old, presenting for the following health issues:  Recheck Medication and Flank Pain  Patient reports that over a month ago he felt sharp pain , sensation to his right lower lumbar, right flank area  He was sitting, he had no history of injury, or lifting or pulling.  He has no abdominal pain, no nausea no vomiting.  No frequency no urgency, no pain with urination, no blood in the urine.  Patient reports previous history of kidney stone.    Since that time he continued to feel more sensation, more feeling.  Otherwise , no other symptoms.          3/12/2024     2:13 PM " "  Additional Questions   Roomed by Torito VALENTIN CMA   Accompanied by None         3/12/2024     2:13 PM   Patient Reported Additional Medications   Patient reports taking the following new medications None     History of Present Illness       Reason for visit:  Lung breathing and sleeplessness  Symptom onset:  3-4 weeks ago  Symptom intensity:  Moderate  Symptom progression:  Worsening  Had these symptoms before:  No  What makes it worse:  Slouching on the couch  What makes it better:  No    He eats 2-3 servings of fruits and vegetables daily.He consumes 2 sweetened beverage(s) daily.He exercises with enough effort to increase his heart rate 60 or more minutes per day.  He exercises with enough effort to increase his heart rate 3 or less days per week.   He is taking medications regularly.           Review of Systems  Constitutional, HEENT, cardiovascular, pulmonary, GI, , musculoskeletal, neuro, skin, endocrine and psych systems are negative, except as otherwise noted.      Objective    /82 (BP Location: Right arm, Patient Position: Chair, Cuff Size: Adult Large)   Pulse 56   Temp 98  F (36.7  C) (Oral)   Resp 16   Ht 1.81 m (5' 11.26\")   Wt 84.4 kg (186 lb)   SpO2 100%   BMI 25.75 kg/m    Body mass index is 25.75 kg/m .  Physical Exam   GENERAL: alert and no distress  NECK: no adenopathy, no asymmetry, masses, or scars  RESP: lungs clear to auscultation - no rales, rhonchi or wheezes  CV: regular rate and rhythm, normal S1 S2, no S3 or S4, no murmur, click or rub, no peripheral edema  ABDOMEN: soft, nontender, no hepatosplenomegaly, no masses and bowel sounds normal  MS: no gross musculoskeletal defects noted, no edema  NEURO: Normal strength and tone, mentation intact and speech normal  BACK: no CVA tenderness, no paralumbar tenderness  Comprehensive back pain exam:  No tenderness, Range of motion not limited by pain, Lower extremity strength functional and equal on both sides, Lower extremity " reflexes within normal limits bilaterally, Lower extremity sensation normal and equal on both sides, and Straight leg raise negative bilaterally    Orders Placed This Encounter   Procedures    REVIEW OF HEALTH MAINTENANCE PROTOCOL ORDERS    UA Macroscopic with reflex to Microscopic and Culture - Lab Collect    UA Microscopic with Reflex to Culture      UA RESULTS:  Recent Labs   Lab Test 03/12/24  1446   COLOR Yellow   APPEARANCE Clear   URINEGLC Negative   URINEBILI Negative   URINEKETONE Negative   SG >=1.030   UBLD Negative   URINEPH 7.0   PROTEIN 30*   UROBILINOGEN 2.0*   NITRITE Negative   LEUKEST Negative   RBCU None Seen   WBCU None Seen            Signed Electronically by: Charlee Hayes MD

## 2024-03-18 ENCOUNTER — MYC MEDICAL ADVICE (OUTPATIENT)
Dept: ADDICTION MEDICINE | Facility: CLINIC | Age: 44
End: 2024-03-18
Payer: COMMERCIAL

## 2024-03-19 NOTE — TELEPHONE ENCOUNTER
1) reviewed patient's [employer] message.     2) He reports having more flank pain and is wondering if Dr. Mauricio can do anything for him.     3) Would like to schedule an appointment in person - his next appointment is 3/20/24 virtual.           ASHER ANDERSON RN on 3/19/2024 at 9:26 AM

## 2024-03-20 ENCOUNTER — VIRTUAL VISIT (OUTPATIENT)
Dept: ADDICTION MEDICINE | Facility: CLINIC | Age: 44
End: 2024-03-20
Payer: COMMERCIAL

## 2024-03-20 DIAGNOSIS — R91.1 NODULE OF RIGHT LUNG: ICD-10-CM

## 2024-03-20 DIAGNOSIS — F11.20 OPIOID USE DISORDER, SEVERE, DEPENDENCE (H): Primary | ICD-10-CM

## 2024-03-20 PROCEDURE — G2211 COMPLEX E/M VISIT ADD ON: HCPCS | Mod: 95 | Performed by: FAMILY MEDICINE

## 2024-03-20 PROCEDURE — 99213 OFFICE O/P EST LOW 20 MIN: CPT | Mod: 95 | Performed by: FAMILY MEDICINE

## 2024-03-20 RX ORDER — BUPRENORPHINE 8 MG/1
4 TABLET SUBLINGUAL 2 TIMES DAILY
Qty: 30 TABLET | Refills: 2 | Status: SHIPPED | OUTPATIENT
Start: 2024-03-20 | End: 2024-03-28

## 2024-03-20 ASSESSMENT — PATIENT HEALTH QUESTIONNAIRE - PHQ9
10. IF YOU CHECKED OFF ANY PROBLEMS, HOW DIFFICULT HAVE THESE PROBLEMS MADE IT FOR YOU TO DO YOUR WORK, TAKE CARE OF THINGS AT HOME, OR GET ALONG WITH OTHER PEOPLE: VERY DIFFICULT
SUM OF ALL RESPONSES TO PHQ QUESTIONS 1-9: 12
SUM OF ALL RESPONSES TO PHQ QUESTIONS 1-9: 12

## 2024-03-20 ASSESSMENT — PAIN SCALES - GENERAL: PAINLEVEL: SEVERE PAIN (6)

## 2024-03-20 NOTE — NURSING NOTE
Is the patient currently in the state of [place]? YES    Visit mode:VIDEO    If the visit is dropped, the patient can be reconnected by: VIDEO VISIT: Text to cell phone:   Telephone Information:   Mobile [phone]       Will anyone else be joining the visit? NO  (If patient encounters technical issues they should call [phone])    How would you like to obtain your AVS? MyChart    Are changes needed to the allergy or medication list? No    Reason for visit: RECHECK    Marivel STEPHEN

## 2024-03-20 NOTE — PROGRESS NOTES
"Virtual Visit Details    Type of service:  Video Visit     Originating Location (pt. Location): {video visit patient location:619999::\"Home\"}  {PROVIDER LOCATION On-site should be selected for visits conducted from your clinic location or adjoining [facility] hospital, academic office, or other nearby [facility] building. Off-site should be selected for all other provider locations, including home:669842}  Distant Location (provider location):  {virtual location provider:220343}  Platform used for Video Visit: {Virtual Visit Platforms:440337::\"InfraSearch\"}  "

## 2024-03-27 NOTE — TELEPHONE ENCOUNTER
RN provided CT scheduling number for patient via [employer] - [phone].    Aurea Argueta RN on 3/27/2024 at 11:43 AM

## 2024-03-28 ENCOUNTER — MYC MEDICAL ADVICE (OUTPATIENT)
Dept: ADDICTION MEDICINE | Facility: CLINIC | Age: 44
End: 2024-03-28
Payer: COMMERCIAL

## 2024-03-28 DIAGNOSIS — F11.20 OPIOID USE DISORDER, SEVERE, DEPENDENCE (H): ICD-10-CM

## 2024-03-28 RX ORDER — BUPRENORPHINE 8 MG/1
8 TABLET SUBLINGUAL 2 TIMES DAILY
Qty: 60 TABLET | Refills: 0 | Status: SHIPPED | OUTPATIENT
Start: 2024-03-28 | End: 2024-05-02

## 2024-03-28 NOTE — TELEPHONE ENCOUNTER
Agree with dose increase. Will send 1 month. Needs to come back for appt within 3 months of last appt, and will send refills once that is completed.    Rober Mauricio MD
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
## 2024-04-02 ENCOUNTER — MYC MEDICAL ADVICE (OUTPATIENT)
Dept: FAMILY MEDICINE | Facility: CLINIC | Age: 44
End: 2024-04-02
Payer: COMMERCIAL

## 2024-04-02 NOTE — TELEPHONE ENCOUNTER
RN spoke with pharmacy and confirmed patient has refills on file.      RN replied to patient via [email or web address]. See message for details.     Sanjay Kendrick RN, BSN, PHN  [facility] [place]

## 2024-04-03 ENCOUNTER — ANCILLARY PROCEDURE (OUTPATIENT)
Dept: CT IMAGING | Facility: CLINIC | Age: 44
End: 2024-04-03
Attending: FAMILY MEDICINE
Payer: COMMERCIAL

## 2024-04-03 DIAGNOSIS — R91.1 NODULE OF RIGHT LUNG: ICD-10-CM

## 2024-04-03 PROCEDURE — 71260 CT THORAX DX C+: CPT | Mod: TC | Performed by: RADIOLOGY

## 2024-04-03 RX ORDER — IOPAMIDOL 755 MG/ML
100 INJECTION, SOLUTION INTRAVASCULAR ONCE
Status: COMPLETED | OUTPATIENT
Start: 2024-04-03 | End: 2024-04-03

## 2024-04-03 RX ADMIN — Medication 65 ML: at 15:55

## 2024-04-03 RX ADMIN — IOPAMIDOL 100 ML: 755 INJECTION, SOLUTION INTRAVASCULAR at 15:55

## 2024-06-02 ENCOUNTER — HEALTH MAINTENANCE LETTER (OUTPATIENT)
Age: 44
End: 2024-06-02

## 2024-06-12 ENCOUNTER — OFFICE VISIT (OUTPATIENT)
Dept: ADDICTION MEDICINE | Facility: CLINIC | Age: 44
End: 2024-06-12
Payer: COMMERCIAL

## 2024-06-12 VITALS — SYSTOLIC BLOOD PRESSURE: 114 MMHG | DIASTOLIC BLOOD PRESSURE: 77 MMHG | HEART RATE: 60 BPM

## 2024-06-12 DIAGNOSIS — F11.20 OPIOID USE DISORDER, SEVERE, DEPENDENCE (H): Primary | ICD-10-CM

## 2024-06-12 DIAGNOSIS — L85.3 DRY SKIN DERMATITIS: ICD-10-CM

## 2024-06-12 PROCEDURE — G2211 COMPLEX E/M VISIT ADD ON: HCPCS | Performed by: FAMILY MEDICINE

## 2024-06-12 PROCEDURE — 80305 DRUG TEST PRSMV DIR OPT OBS: CPT | Performed by: FAMILY MEDICINE

## 2024-06-12 PROCEDURE — 99214 OFFICE O/P EST MOD 30 MIN: CPT | Performed by: FAMILY MEDICINE

## 2024-06-12 RX ORDER — TRIAMCINOLONE ACETONIDE 1 MG/G
CREAM TOPICAL 2 TIMES DAILY
Qty: 45 G | Refills: 1 | Status: SHIPPED | OUTPATIENT
Start: 2024-06-12 | End: 2024-07-16

## 2024-06-12 RX ORDER — BUPRENORPHINE 8 MG/1
8 TABLET SUBLINGUAL 2 TIMES DAILY
Qty: 60 TABLET | Refills: 1 | Status: SHIPPED | OUTPATIENT
Start: 2024-06-12 | End: 2024-08-29
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
## 2024-06-12 NOTE — PROGRESS NOTES
[facility] - Addiction Medicine       Rooming information:    Point of care urine drug screen positive for:  Lab Results   Component Value Date    BUP Screen Positive (A) 06/12/2024    BZO Negative 06/12/2024    BAR Negative 06/12/2024    DANE Negative 06/12/2024    MAMP Negative 06/12/2024    AMP Negative 06/12/2024    MDMA Negative 06/12/2024    MTD Negative 06/12/2024    MQY711 Negative 06/12/2024    OXY Negative 06/12/2024    PCP Negative 06/12/2024    THC Screen Positive (A) 06/12/2024    TEMP 94 F 06/12/2024    SGPOCT 1.015 06/12/2024       *POC urine drug screen does not screen for Fentanyl    PHQ-9 Scores:       7/27/2023     9:19 AM 3/12/2024     1:59 PM 3/20/2024     8:51 AM   PHQ   PHQ-9 Total Score 1 3 12   Q9: Thoughts of better off dead/self-harm past 2 weeks Not at all Not at all Not at all     JULIETH-7 Scores:      1/7/2022    10:36 AM 1/28/2022    10:55 AM 3/12/2024     2:00 PM   JULIETH-7 SCORE   Total Score 0 (minimal anxiety) 0 (minimal anxiety) 1 (minimal anxiety)   Total Score 0 0 1       Any other recent substance use:     Denies    NICOTINE-Yes: Vaping and lozenges   If using nicotine, ready to quit? No    Side effects related to medications pt would like to discuss with provider (constipation, dry mouth, HA, GI upset, sedation?) No     Narcan currently available: Yes    Primary care provider: Charlee Hayes MD     Mental health provider: No (follow up on MH referral if needed)    Any housing, insurance deficits?: None    Contact information up to date? yes    3rd Party Involvement not today (please obtain JARET if pt would like to include)      Oracio Fisher MA  June 12, 2024  3:49 PM

## 2024-06-27 ENCOUNTER — MYC MEDICAL ADVICE (OUTPATIENT)
Dept: FAMILY MEDICINE | Facility: CLINIC | Age: 44
End: 2024-06-27
Payer: COMMERCIAL

## 2024-06-27 NOTE — TELEPHONE ENCOUNTER
RN replied to patient via [facility]. See message for details.     Sanjay Kendrick RN, BSN, PHN  [facility]: [place]

## 2024-07-16 ENCOUNTER — OFFICE VISIT (OUTPATIENT)
Dept: FAMILY MEDICINE | Facility: CLINIC | Age: 44
End: 2024-07-16
Payer: COMMERCIAL

## 2024-07-16 VITALS
SYSTOLIC BLOOD PRESSURE: 126 MMHG | BODY MASS INDEX: 26.6 KG/M2 | RESPIRATION RATE: 14 BRPM | WEIGHT: 190 LBS | OXYGEN SATURATION: 97 % | TEMPERATURE: 97.9 F | HEIGHT: 71 IN | DIASTOLIC BLOOD PRESSURE: 84 MMHG | HEART RATE: 52 BPM

## 2024-07-16 DIAGNOSIS — L85.3 DRY SKIN DERMATITIS: ICD-10-CM

## 2024-07-16 DIAGNOSIS — F51.01 PRIMARY INSOMNIA: ICD-10-CM

## 2024-07-16 DIAGNOSIS — L30.8 OTHER ECZEMA: Primary | ICD-10-CM

## 2024-07-16 DIAGNOSIS — Z12.11 SCREEN FOR COLON CANCER: ICD-10-CM

## 2024-07-16 DIAGNOSIS — M47.816 PRIMARY OSTEOARTHRITIS OF LUMBAR SPINE: ICD-10-CM

## 2024-07-16 PROCEDURE — 99214 OFFICE O/P EST MOD 30 MIN: CPT | Performed by: FAMILY MEDICINE

## 2024-07-16 RX ORDER — CLOBETASOL PROPIONATE 0.5 MG/G
CREAM TOPICAL 2 TIMES DAILY
Qty: 60 G | Refills: 0 | Status: SHIPPED | OUTPATIENT
Start: 2024-07-16 | End: 2024-09-03

## 2024-07-16 RX ORDER — ZOLPIDEM TARTRATE 5 MG/1
5 TABLET ORAL
Qty: 30 TABLET | Refills: 1 | Status: SHIPPED | OUTPATIENT
Start: 2024-07-16

## 2024-07-16 RX ORDER — GABAPENTIN 600 MG/1
TABLET ORAL
Qty: 360 TABLET | Refills: 3 | Status: SHIPPED | OUTPATIENT
Start: 2024-07-16

## 2024-07-16 RX ORDER — TRIAMCINOLONE ACETONIDE 1 MG/G
CREAM TOPICAL 2 TIMES DAILY
Qty: 45 G | Refills: 1 | Status: SHIPPED | OUTPATIENT
Start: 2024-07-16 | End: 2024-07-16

## 2024-07-16 RX ORDER — PREDNISONE 20 MG/1
TABLET ORAL
Qty: 18 TABLET | Refills: 0 | Status: SHIPPED | OUTPATIENT
Start: 2024-07-16 | End: 2024-08-29

## 2024-07-16 ASSESSMENT — ANXIETY QUESTIONNAIRES
5. BEING SO RESTLESS THAT IT IS HARD TO SIT STILL: NOT AT ALL
1. FEELING NERVOUS, ANXIOUS, OR ON EDGE: NOT AT ALL
GAD7 TOTAL SCORE: 2
3. WORRYING TOO MUCH ABOUT DIFFERENT THINGS: NOT AT ALL
4. TROUBLE RELAXING: NOT AT ALL
8. IF YOU CHECKED OFF ANY PROBLEMS, HOW DIFFICULT HAVE THESE MADE IT FOR YOU TO DO YOUR WORK, TAKE CARE OF THINGS AT HOME, OR GET ALONG WITH OTHER PEOPLE?: NOT DIFFICULT AT ALL
7. FEELING AFRAID AS IF SOMETHING AWFUL MIGHT HAPPEN: NOT AT ALL
2. NOT BEING ABLE TO STOP OR CONTROL WORRYING: NOT AT ALL
6. BECOMING EASILY ANNOYED OR IRRITABLE: MORE THAN HALF THE DAYS
GAD7 TOTAL SCORE: 2
7. FEELING AFRAID AS IF SOMETHING AWFUL MIGHT HAPPEN: NOT AT ALL
GAD7 TOTAL SCORE: 2
IF YOU CHECKED OFF ANY PROBLEMS ON THIS QUESTIONNAIRE, HOW DIFFICULT HAVE THESE PROBLEMS MADE IT FOR YOU TO DO YOUR WORK, TAKE CARE OF THINGS AT HOME, OR GET ALONG WITH OTHER PEOPLE: NOT DIFFICULT AT ALL

## 2024-07-16 ASSESSMENT — PAIN SCALES - GENERAL: PAINLEVEL: NO PAIN (0)

## 2024-07-16 NOTE — PROGRESS NOTES
Assessment & Plan     Other eczema    - clobetasol propionate (TEMOVATE) 0.05 % external cream; Apply topically 2 times daily  - predniSONE (DELTASONE) 20 MG tablet; 3 tab daily for 3 days, 2 tab daily for 3 days, then one tab daily until gone    Primary osteoarthritis of lumbar spine  Increased dose  - gabapentin (NEURONTIN) 600 MG tablet; 2 tab ( 1200 mg ) at bedtime, one tab in AM and one tab at noon ( dose increased _    Dry skin dermatitis      Primary insomnia  Use as needed  - zolpidem (AMBIEN) 5 MG tablet; Take 1 tablet (5 mg) by mouth nightly as needed for sleep    Screen for colon cancer   Would like to check with health insurance first, to see if it covered.        Subjective   James is a 44 year old, presenting for the following health issues:  Recheck Medication and Derm Problem  Patient reports he has been having a dry scaling itchy rash to both of his lower leg area for over 2 months.  He reports he tried topical triamcinolone cream did not work.  In addition,  he tried over-the-counter medication.  He has no fever, he has not any  He is not aware of any new exposures.        7/16/2024     4:51 PM   Additional Questions   Roomed by Lakesha   Accompanied by none         7/16/2024     4:51 PM   Patient Reported Additional Medications   Patient reports taking the following new medications none     History of Present Illness       Reason for visit:  Skin irritation meds and sleep    He eats 2-3 servings of fruits and vegetables daily.He consumes 0 sweetened beverage(s) daily.He exercises with enough effort to increase his heart rate 10 to 19 minutes per day.  He exercises with enough effort to increase his heart rate 4 days per week.   He is taking medications regularly.       Medication Followup of medications   Taking Medication as prescribed: yes  Side Effects:  None  Medication Helping Symptoms:  sometimes    Rash  Onset/Duration: 2 months   Description  Location: legs initially now spreading to arms  "  Character: round, raised, flakey, painful, burning, draining, red  Itching: severe  Intensity:  severe  Progression of Symptoms:  worsening is spreading   Accompanying signs and symptoms:   Fever: No  Body aches or joint pain: No  Sore throat symptoms: No  Recent cold symptoms: No  History:           Previous episodes of similar rash: has been on his legs in the past at the end of the winter, usually will be gone by the end of spring, this year nothing is helping   New exposures:  None  Recent travel: No  Exposure to similar rash: No  Precipitating or alleviating factors: unsure  Therapies tried and outcome: many things         Review of Systems  Constitutional, HEENT, cardiovascular, pulmonary, GI, , musculoskeletal, neuro, skin, endocrine and psych systems are negative, except as otherwise noted.      Objective    /84 (BP Location: Right arm, Patient Position: Sitting, Cuff Size: Adult Large)   Pulse 52   Temp 97.9  F (36.6  C) (Oral)   Resp 14   Ht 1.81 m (5' 11.26\")   Wt 86.2 kg (190 lb)   SpO2 97%   BMI 26.31 kg/m    Body mass index is 26.31 kg/m .  Physical Exam   GENERAL: alert and no distress  SKIN: no suspicious lesions or rashes and xerosis -  lower legs    No orders of the defined types were placed in this encounter.           Signed Electronically by: Charlee Hayes MD    "

## 2024-08-29 ENCOUNTER — VIRTUAL VISIT (OUTPATIENT)
Dept: ADDICTION MEDICINE | Facility: CLINIC | Age: 44
End: 2024-08-29
Payer: COMMERCIAL

## 2024-08-29 VITALS — WEIGHT: 190 LBS | HEIGHT: 71 IN | BODY MASS INDEX: 26.6 KG/M2

## 2024-08-29 DIAGNOSIS — F11.20 OPIOID USE DISORDER, SEVERE, DEPENDENCE (H): Primary | ICD-10-CM

## 2024-08-29 DIAGNOSIS — F32.5 MAJOR DEPRESSIVE DISORDER IN FULL REMISSION, UNSPECIFIED WHETHER RECURRENT (H): ICD-10-CM

## 2024-08-29 DIAGNOSIS — F17.200 TOBACCO DEPENDENCE: ICD-10-CM

## 2024-08-29 PROCEDURE — 99214 OFFICE O/P EST MOD 30 MIN: CPT | Mod: 95 | Performed by: FAMILY MEDICINE

## 2024-08-29 PROCEDURE — G2211 COMPLEX E/M VISIT ADD ON: HCPCS | Mod: 95 | Performed by: FAMILY MEDICINE

## 2024-08-29 RX ORDER — BUPRENORPHINE 8 MG/1
TABLET SUBLINGUAL
Qty: 67 TABLET | Refills: 2 | Status: SHIPPED | OUTPATIENT
Start: 2024-08-29

## 2024-08-29 ASSESSMENT — PATIENT HEALTH QUESTIONNAIRE - PHQ9
SUM OF ALL RESPONSES TO PHQ QUESTIONS 1-9: 1
SUM OF ALL RESPONSES TO PHQ QUESTIONS 1-9: 1
10. IF YOU CHECKED OFF ANY PROBLEMS, HOW DIFFICULT HAVE THESE PROBLEMS MADE IT FOR YOU TO DO YOUR WORK, TAKE CARE OF THINGS AT HOME, OR GET ALONG WITH OTHER PEOPLE: SOMEWHAT DIFFICULT

## 2024-08-29 ASSESSMENT — PAIN SCALES - GENERAL: PAINLEVEL: SEVERE PAIN (7)
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
## 2024-08-29 NOTE — NURSING NOTE
Current patient location: [address] [place] [address] AVE   [facility] [address]    Is the patient currently in the state of [place]? YES    Visit mode:VIDEO    If the visit is dropped, the patient can be reconnected by: VIDEO VISIT: Text to cell phone:   Telephone Information:   Mobile [phone]       Will anyone else be joining the visit? NO  (If patient encounters technical issues they should call [phone]    How would you like to obtain your AVS? MyChart    Are changes needed to the allergy or medication list? No    Are refills needed on medications prescribed by this physician? NO    Rooming Documentation:  Questionnaire(s) completed      Reason for visit: ANATOLY VELEZF

## 2024-08-29 NOTE — PROGRESS NOTES
"Virtual Visit Details    Type of service:  Video Visit     Originating Location (pt. Location): {video visit patient location:731440::\"Home\"}  {PROVIDER LOCATION On-site should be selected for visits conducted from your clinic location or adjoining [facility] hospital, academic office, or other nearby [facility] building. Off-site should be selected for all other provider locations, including home:167031}  Distant Location (provider location):  {virtual location provider:088688}  Platform used for Video Visit: {Virtual Visit Platforms:022471::\""[employer]"\"}    "

## 2024-08-29 NOTE — PATIENT INSTRUCTIONS
Patient Education   Addiction Medicine  What to Expect  Here's what to expect from our Addiction Medicine program.  About Addiction Medicine  Addiction Medicine clinics help you with substance use problems. You set your own goals. We try to help you reach your goals. Your care plan can include:  Medicine  Creating a recovery plan  Helping you find local resources  Helping with treatment options  Clinic phone number and addresses  Clinic Phone: [phone]  Mental Health and Addiction Clinic  [facility]  [address], [address]  [place] [address]  [facility]  [address] [facility] [address], [address]  [place] [address]  Walk-in services  We offer walk-in care for patients at the Recovery Clinic. This is only for patients with Opioid Use Disorder (OUD). Anyone with OUD is welcome. Our providers will refer you to the Recovery Clinic if you're struggling to keep up with your medicines or appointments.  Recovery Clinic ([facility])  [address], [address]  [place] [address]  Phone: [phone]  The Recovery Clinic is open for walk-ins Monday to Friday 9 a.m. to 11:30 a.m. and 12:30 p.m. to 3 p.m.  How it works  Come to your visits every time. The treatment works better when you do.   You can have as many visits as you need. When you're better, we'll refer you back to being cared for by your family doctor.   If you need it, we'll send you to doctors, psychiatrists, therapists, and other providers. We focus on treating addiction. We don't treat other problems, like managing other medicines or non-addiction issues.  About visits  Urine drug testing  We'll often test your pee (urine) for drugs. This is the only way we can know for sure whether or not you're using drugs. It helps us treat you without judgement.   Suboxone (buprenorphine)  If you're taking buprenorphine, you'll have a lot of visits at first. If your problem is getting worse, or you're  "using substances, we may schedule you for extra visits.   Cancelling visits  If you can't come to your visit, please call us right away at [phone]. If you don't cancel at least 24 hours (1 full day) before your visit, that's \"late cancellation.\"  Being late to visits  If you come late, you may not be seen. This will count as a \"no-show.\"  Please call the clinic if you're running late. This will help us plan, but it doesn't mean you'll be seen.   Being late is:  More than 15 minutes late for a return visit.  More than 30 minutes late for your first visit.  If you cancel late or don't show up 2 times within 6 months, we may transfer you to another clinic.   Getting help between visits  If you need help between visits, you can call us Monday to Friday from 8 a.m. to 4:30 p.m. at [phone]. You can also send us a message on [employer].  Medicine refills  If you miss or cancel a visit, you can still ask for a refill. But we can only refill your medicines if you've made a new appointment.  Please call your pharmacy for medicine refills. If you have a question about your refill, call us at [phone].  It takes up to 2 business days to refill your drugs. Let us know 2 to 3 days before you run out. Don't call more than 1 week before you run out. That's too early.   Please make sure we have your right phone number.  If we have a problem with your refill, we'll call you. If we call you, please call us back right away. If you don't, you may not get your medicines quickly.   Call your pharmacy to find out if your medicines are ready.   Keep your medicines in a safe place. Keep them away from pets and children. If your medicines are lost or stolen, we usually don't replace them. We recommend you file a police report if your medicines are stolen. Your insurance may not pay for early refills, even if you have a prescription.  Forms  Please give us at least 3 business days to fill out any forms. Bring the forms to your " visits if you can. We may refer you to other members of your care team to complete the forms.   Emergency care   Call 911 or go to the nearest emergency room if your life or someone else's life is in danger.  Call 988 anytime for the Suicide and Crisis Lifeline.  If you need care when we're closed, call your family doctor to see if they can help. You can also go to urgent care or an emergency room. [facility] emergency rooms may be able to give you buprenorphine or other medicine refills.  Thank you for choosing us for your care.  For informational purposes only. Not to replace the advice of your health care provider. Copyright   2023 [facility]. All rights reserved. [employer] 105981 - REV 05/23.

## 2024-09-03 ENCOUNTER — OFFICE VISIT (OUTPATIENT)
Dept: FAMILY MEDICINE | Facility: CLINIC | Age: 44
End: 2024-09-03
Payer: COMMERCIAL

## 2024-09-03 VITALS
RESPIRATION RATE: 16 BRPM | BODY MASS INDEX: 27.33 KG/M2 | DIASTOLIC BLOOD PRESSURE: 76 MMHG | WEIGHT: 195.2 LBS | HEIGHT: 71 IN | SYSTOLIC BLOOD PRESSURE: 120 MMHG | HEART RATE: 57 BPM | OXYGEN SATURATION: 100 % | TEMPERATURE: 98 F

## 2024-09-03 DIAGNOSIS — L30.8 OTHER ECZEMA: ICD-10-CM

## 2024-09-03 DIAGNOSIS — D32.0 BENIGN MENINGIOMA OF BRAIN (H): Primary | ICD-10-CM

## 2024-09-03 DIAGNOSIS — Z87.891 FORMER SMOKER: ICD-10-CM

## 2024-09-03 DIAGNOSIS — Z12.11 SCREEN FOR COLON CANCER: ICD-10-CM

## 2024-09-03 DIAGNOSIS — R91.1 NODULE OF RIGHT LUNG: ICD-10-CM

## 2024-09-03 DIAGNOSIS — D32.9 MENINGIOMA (H): Primary | ICD-10-CM

## 2024-09-03 PROCEDURE — 99214 OFFICE O/P EST MOD 30 MIN: CPT | Performed by: FAMILY MEDICINE

## 2024-09-03 RX ORDER — CLOBETASOL PROPIONATE 0.5 MG/G
CREAM TOPICAL 2 TIMES DAILY
Qty: 60 G | Refills: 0 | Status: SHIPPED | OUTPATIENT
Start: 2024-09-03

## 2024-09-03 NOTE — PROGRESS NOTES
"  Assessment & Plan     Benign meningioma of brain (H)  Recently was seen in the ER, had a repeat head CT scan which showed increase in size. Reviewed head CT scan from August, 24/2024.  Previously, it was 2 cm, now is up to 2.7 cm.  He has no other symptoms.  A referral to a neurosurgeon  - Adult Neurosurgery  Referral; Future    Other eczema  Use bid as needed  - clobetasol propionate (TEMOVATE) 0.05 % external cream; Apply topically 2 times daily.    Screen for colon cancer  Screening.  - Colonoscopy Screening  Referral; Future    Nodule of right lung  Former smoker    Reviewed his lung CT scan from April 3, 2024.  Which showed a few small pulmonary nodules measuring up to 5 mm.  Patient quit smoking 2 years ago.    Discussed with patient he may need to repeat his lung CT scan in a year's time.      BMI  Estimated body mass index is 27.03 kg/m  as calculated from the following:    Height as of this encounter: 1.81 m (5' 11.26\").    Weight as of this encounter: 88.5 kg (195 lb 3.2 oz).   Weight management plan: Discussed healthy diet and exercise guidelines      Work on weight loss  Regular exercise    Akiko Ramirez is a 44 year old, presenting for the following health issues:  Derm Problem and Results (CT head/)  Patient was seen in the ER, he had head CT scan,  Patient previous history of benign meningioma, he has no headache, no change in his vision.    Patient is a former smoker, his previous lung CT scan from) April of this year showed small 5 mm nodules.    Patient denies short of breath, no cough, no chest pain.        9/3/2024     3:06 PM   Additional Questions   Roomed by Kayleigh CARR MA     HPI     Discuss recent CT results.     Derm Problem  Onset/Duration: On/off for a year  Description  Location: Head/scalp  Character: bumps, red, itchy  Itching: mild  Intensity:  mild  Progression of Symptoms:  worsening  Accompanying signs and symptoms:   Fever: No  Body aches or joint pain: " "No  Sore throat symptoms: No  Recent cold symptoms: No  History:           Previous episodes of similar rash: yes  New exposures:  None  Recent travel: No  Exposure to similar rash: No  Precipitating or alleviating factors: Tried OTC medications but not working  Therapies tried and outcome: OTC medications not helping        Review of Systems  Constitutional, HEENT, cardiovascular, pulmonary, GI, , musculoskeletal, neuro, skin, endocrine and psych systems are negative, except as otherwise noted.      Objective    /76 (BP Location: Left arm, Patient Position: Chair, Cuff Size: Adult Regular)   Pulse 57   Temp 98  F (36.7  C) (Oral)   Resp 16   Ht 1.81 m (5' 11.26\")   Wt 88.5 kg (195 lb 3.2 oz)   SpO2 100%   BMI 27.03 kg/m    Body mass index is 27.03 kg/m .  Physical Exam   GENERAL: alert and no distress  SKIN: xerosis - head  PSYCH: mentation appears normal, affect normal/bright    Orders Placed This Encounter   Procedures    Colonoscopy Screening  Referral    Adult Neurosurgery  Referral            Signed Electronically by: Charlee Hayes MD    "

## 2024-09-17 ENCOUNTER — TELEPHONE (OUTPATIENT)
Dept: NEUROSURGERY | Facility: CLINIC | Age: 44
End: 2024-09-17
Payer: COMMERCIAL

## 2024-09-17 NOTE — TELEPHONE ENCOUNTER
Left Voicemail (1st Attempt) for the patient to call back and schedule the following:    Appointment type: New tumor neurosurg - in person  Provider: Dr. Chua  Return date: Next available  Specialty phone number: [phone]  Additional appointment(s) needed: N/A  Additonal Notes: Meningioma/ Epic/Pacs

## 2024-09-20 ENCOUNTER — TELEPHONE (OUTPATIENT)
Dept: NEUROSURGERY | Facility: CLINIC | Age: 44
End: 2024-09-20
Payer: COMMERCIAL

## 2024-09-20 NOTE — TELEPHONE ENCOUNTER
Left Voicemail (2nd Attempt) for the patient to call back and schedule the following:    Appointment type: New tumor neurosurg - in person  Provider: Dr. Chua  Return date: Next available  Specialty phone number: [phone]  Additional appointment(s) needed: N/A  Additonal Notes: Meningioma/ Epic/Pacs

## 2024-10-02 ENCOUNTER — PATIENT OUTREACH (OUTPATIENT)
Dept: CARE COORDINATION | Facility: CLINIC | Age: 44
End: 2024-10-02
Payer: COMMERCIAL

## 2024-11-06 ENCOUNTER — OFFICE VISIT (OUTPATIENT)
Dept: ADDICTION MEDICINE | Facility: CLINIC | Age: 44
End: 2024-11-06
Payer: COMMERCIAL

## 2024-11-06 VITALS — SYSTOLIC BLOOD PRESSURE: 114 MMHG | HEART RATE: 83 BPM | DIASTOLIC BLOOD PRESSURE: 73 MMHG

## 2024-11-06 DIAGNOSIS — F32.5 MAJOR DEPRESSIVE DISORDER IN FULL REMISSION, UNSPECIFIED WHETHER RECURRENT (H): ICD-10-CM

## 2024-11-06 DIAGNOSIS — F17.200 TOBACCO DEPENDENCE: ICD-10-CM

## 2024-11-06 DIAGNOSIS — F11.20 OPIOID USE DISORDER, SEVERE, DEPENDENCE (H): Primary | ICD-10-CM

## 2024-11-06 PROCEDURE — G2211 COMPLEX E/M VISIT ADD ON: HCPCS | Performed by: FAMILY MEDICINE

## 2024-11-06 PROCEDURE — 80305 DRUG TEST PRSMV DIR OPT OBS: CPT | Performed by: FAMILY MEDICINE

## 2024-11-06 PROCEDURE — 99214 OFFICE O/P EST MOD 30 MIN: CPT | Performed by: FAMILY MEDICINE

## 2024-11-06 ASSESSMENT — PAIN SCALES - GENERAL: PAINLEVEL_OUTOF10: MODERATE PAIN (5)

## 2024-11-06 NOTE — PATIENT INSTRUCTIONS
Patient Education   Addiction Medicine  What to Expect  Here's what to expect from our Addiction Medicine program.  About Addiction Medicine  Addiction Medicine clinics help you with substance use problems. You set your own goals. We try to help you reach your goals. Your care plan can include:  Medicine  Creating a recovery plan  Helping you find local resources  Helping with treatment options  Clinic phone number and addresses  Clinic Phone: [phone]  Mental Health and Addiction Clinic  [facility]  [address], [address]  [place] [address]  [facility]  [address], [address]  [place] [address]  Walk-in services  We offer walk-in care for patients at the Recovery Clinic. This is only for patients with Opioid Use Disorder (OUD). Anyone with OUD is welcome. Our providers will refer you to the Recovery Clinic if you're struggling to keep up with your medicines or appointments.  Recovery Clinic ([facility])  [address], [address]  [place] [address]  Phone: [phone]  The Recovery Clinic is open for walk-ins Monday to Friday 9 a.m. to 11:30 a.m. and 12:30 p.m. to 3 p.m.  How it works  Come to your visits every time. The treatment works better when you do.   You can have as many visits as you need. When you're better, we'll refer you back to being cared for by your family doctor.   If you need it, we'll send you to doctors, psychiatrists, therapists, and other providers. We focus on treating addiction. We don't treat other problems, like managing other medicines or non-addiction issues.  About visits  Urine drug testing  We'll often test your pee (urine) for drugs. This is the only way we can know for sure whether or not you're using drugs. It helps us treat you without judgement.   Suboxone (buprenorphine)  If you're taking buprenorphine, you'll have a lot of visits at first. If your problem is getting worse, or you're  "using substances, we may schedule you for extra visits.   Cancelling visits  If you can't come to your visit, please call us right away at [phone]. If you don't cancel at least 24 hours (1 full day) before your visit, that's \"late cancellation.\"  Being late to visits  If you come late, you may not be seen. This will count as a \"no-show.\"  Please call the clinic if you're running late. This will help us plan, but it doesn't mean you'll be seen.   Being late is:  More than 15 minutes late for a return visit.  More than 30 minutes late for your first visit.  If you cancel late or don't show up 2 times within 6 months, we may transfer you to another clinic.   Getting help between visits  If you need help between visits, you can call us Monday to Friday from 8 a.m. to 4:30 p.m. at [phone]. You can also send us a message on [employer].  Medicine refills  If you miss or cancel a visit, you can still ask for a refill. But we can only refill your medicines if you've made a new appointment.  Please call your pharmacy for medicine refills. If you have a question about your refill, call us at [phone].  It takes up to 2 business days to refill your drugs. Let us know 2 to 3 days before you run out. Don't call more than 1 week before you run out. That's too early.   Please make sure we have your right phone number.  If we have a problem with your refill, we'll call you. If we call you, please call us back right away. If you don't, you may not get your medicines quickly.   Call your pharmacy to find out if your medicines are ready.   Keep your medicines in a safe place. Keep them away from pets and children. If your medicines are lost or stolen, we usually don't replace them. We recommend you file a police report if your medicines are stolen. Your insurance may not pay for early refills, even if you have a prescription.  Forms  Please give us at least 3 business days to fill out any forms. Bring the forms to your " visits if you can. We may refer you to other members of your care team to complete the forms.   Emergency care   Call 911 or go to the nearest emergency room if your life or someone else's life is in danger.  Call 988 anytime for the Suicide and Crisis Lifeline.  If you need care when we're closed, call your family doctor to see if they can help. You can also go to urgent care or an emergency room. [facility] emergency rooms may be able to give you buprenorphine or other medicine refills.  Thank you for choosing us for your care.  For informational purposes only. Not to replace the advice of your health care provider. Copyright   2023 [facility]. All rights reserved. [employer] [id number] - REV 05/23.

## 2024-11-06 NOTE — PROGRESS NOTES
[facility]    A/P                                                    ASSESSMENT/PLAN  Diagnoses and all orders for this visit:  Opioid use disorder, severe, dependence (H)  -     Drugs of Abuse Screen Urine (POC CUPS) POCT  Major depressive disorder in full remission, unspecified whether recurrent (H)  Tobacco dependence    No orders of the defined types were placed in this encounter.          Nov 6, 2024  - Remains stable on current dose of Subutex, no changes today  - Pain stable, no significant changes. Using PRN dose rarely  - Mood stable. Finding it easier to let things go, which is freeing  - Tobacco-free but continues to use his vape and nicotine lozenges, not wanting to stop vaping at this time  - Consistent use of cannabis but no concerns for consequences of use at this time  - Continues to attend meetings regularly, finding good support from his sober support network       Continued Complex Management  The longitudinal plan of care for Opioid Use Disorder (OUD) was addressed during this visit. Due to the added complexity in care, I will continue to support James in the subsequent management and with ongoing continuity of care.      Last encounter A/P  Aug 29, 2024  - Continue subutex, adding PRN dose every other day, 1/2 tablet, to help manage chronic pain. Discussed avoidance of the automatic reflex to take medication in response to pain, and James has does well engaging with physical therapy and activity to round out his treatment   - Not depressed but not happy with life. Looking for new goals  - No change in tobacco/nicotine. F/up at next visit      PDMP Review         Value Time User    State PDMP site checked  Yes 8/29/2024  3:59 PM Rober Mauricio MD              RTC  Return in about 3 months (around 2/6/2025) for in person.      Counseled the patient on the importance of having a recovery program in addition to medication to manage recovery.  Components include  avoiding isolating, having willingness to change, avoiding triggers and managing cravings. Encouraged having some type of sober network and practicing honesty with trusted support person(s). Encouraged other services such as counseling, 12 step or other self-help organizations.      Opioid warning reviewed.  Risk of overdose following a period of abstinence due to decrease tolerance was discussed including risk of death.  Strongly recommended abstain from alcohol, benzodiazepines, THC, opioids and other drugs of abuse.  Increased risk of return to opioid use after use of these substances discussed.  Increased risk of overdose/death with use of other substances particularly benzodiazepines/alcohol reviewed.        SUBJECTIVE                                                    Edgar Williamson is a 44 year old male who presents to clinic today for follow up    Visit performed In Person, face-to-face        Recent HPI Details:  HPI Jun 12, 2024  - Staying active when he can, but says he is looking for more discipline  - Starts to feel pain acting up and this motivates him to get moving more  - THC gummies help him with pain and sleep  - Continues to vape, using nicotine lozenges occasionally, especially at work.   - No cravings, no use. 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 These are inconsistent but prevent sleep, which then worsens mood and outlook      TODAY'S VISIT  HPI Nov 6, 2024  - Work stressors stable currently  -  No cravings  - Using cannabis at the end of the work day, most often when he has increased pain, and sometimes at bedtime  - Otherwise no substance use  - Back pain consistent, not improving despite yoga and stretching  - Not using additional dose of subutex as prescribed  - Quit 2 years cigarettes. Lozenges are helpful during the day, and vaping otherwise.  - Sees his son less often than he used to. Fills his weekend time with AA meetings.      OBJECTIVE  PHYSICAL EXAM:  /73 (BP Location: Right arm, Patient Position: Sitting, Cuff Size: Adult Regular)   Pulse 83     GENERAL: healthy, alert and no distress  EYES: PERRLA. Sclerae injected. Eyelids heavy  RESP: No respiratory distress  MENTAL STATUS EXAM  Appearance/Behavior: No appearant distress  Speech: Normal  Mood/Affect: normal affect  Insight: Adequate      PHQ-9 Score:       3/20/2024     8:51 AM 7/16/2024     4:40 PM 8/29/2024     3:10 PM   PHQ   PHQ-9 Total Score 12 3 1   Q9: Thoughts of better off dead/self-harm past 2 weeks Not at all  Not at all  Not at all        Patient-reported       JULIETH-7 Score:      1/28/2022    10:55 AM 3/12/2024     2:00 PM 7/16/2024     4:40 PM   JULIETH-7 SCORE   Total Score 0 (minimal anxiety) 1 (minimal anxiety) 2 (minimal anxiety)   Total Score 0 1 2       LABS (may not contain today's labs)                                                      Today's lab data  Results for orders placed or performed in visit on 11/06/24   Drugs of Abuse Screen Urine (POC CUPS) POCT     Status: Abnormal   Result Value Ref Range    POCT Kit Lot Number [id number]     POCT Kit Expiration Date 2025-05-15     Temperature Urine POCT 94 F 90 F, 92 F, 94 F, 96 F, 98 F, 100 F    Specific [place] POCT 1.025 1.005, 1.015, 1.025    pH Qual Urine POCT 5 pH 4 pH, 5 pH, 7 pH, 9 pH    Creatinine Qual Urine POCT 100 mg/dL 20 mg/dL, 50 mg/dL, 100 mg/dL, 200 mg/dL    Internal QC Qual Urine POCT Valid Valid    Amphetamine Qual Urine POCT Negative Negative     Barbiturate Qual Urine POCT Negative Negative    Buprenorphine Qual Urine POCT Screen Positive (A) Negative    Benzodiazepine Qual Urine POCT Negative Negative    Cocaine Qual Urine POCT Negative Negative    Methamphetamine Qual Urine POCT Negative Negative    MDMA Qual Urine POCT Negative Negative    Methadone Qual Urine POCT Negative Negative    Opiate Qual Urine POCT Negative Negative    Oxycodone Qual Urine POCT Negative Negative    Phencyclidine Qual Urine POCT Negative Negative    THC Qual Urine POCT Screen Positive (A) Negative           HISTORY                                                    Problem list reviewed & adjusted, as indicated.  Patient Active Problem List   Diagnosis    CARDIOVASCULAR SCREENING; LDL GOAL LESS THAN 160    Moderate major depression (H)    Generalized anxiety disorder    Benign meningioma of brain (H)    Primary osteoarthritis of lumbar spine    Strain of lumbar region, initial encounter    Trichiasis of right lower eyelid    Stasis dermatitis of both legs    Seasonal allergies    Poisoning by benzodiazepines, undetermined, initial encounter    Nodule of right lung    Lower urinary tract infectious disease    Leukocytosis    Kidney stone    Eyelid laceration, right, initial encounter    Alcohol abuse    Controlled substance agreement signed    History of hepatitis C virus infection    Opioid use disorder, severe, dependence (H) with chronic pain    Tobacco dependence    High risk medication use    F11.2 - Continuous opioid dependence (H)         MEDICATION LIST (prior to visit)  Current Outpatient Medications   Medication Sig Dispense Refill    buprenorphine (SUBUTEX) 8 MG SUBL sublingual tablet Place 1 tablet (8 mg) under the tongue 2 times daily. May also place 0.5 tablets (4 mg) every 48 hours as needed for breakthrough pain. 67 tablet 2    gabapentin (NEURONTIN) 600 MG tablet 2 tab ( 1200 mg ) at bedtime, one tab in AM and one tab at noon ( dose increased _ 360 tablet 3     ibuprofen (ADVIL/MOTRIN) 800 MG tablet Take 1 tablet (800 mg) by mouth every 8 hours as needed for moderate pain 360 tablet 3    Lidocaine (LIDOCARE) 4 % Patch Place 1 patch onto the skin every 24 hours To prevent lidocaine toxicity, patient should be patch free for 12 hrs daily. 30 patch 4    nicotine (NICORETTE) 4 MG lozenge PLACE 1 LOZENGE(4 MG) BY MOUTH INSIDE CHEEK EVERY HOUR AS NEEDED FOR SMOKING CESSATION 144 lozenge 11    zolpidem (AMBIEN) 5 MG tablet Take 1 tablet (5 mg) by mouth nightly as needed for sleep 30 tablet 1    clobetasol propionate (TEMOVATE) 0.05 % external cream Apply topically 2 times daily. 60 g 0     No current facility-administered medications for this visit.       MEDICATION LIST (after visit)  Current Outpatient Medications   Medication Sig Dispense Refill    buprenorphine (SUBUTEX) 8 MG SUBL sublingual tablet Place 1 tablet (8 mg) under the tongue 2 times daily. May also place 0.5 tablets (4 mg) every 48 hours as needed for breakthrough pain. 67 tablet 2    gabapentin (NEURONTIN) 600 MG tablet 2 tab ( 1200 mg ) at bedtime, one tab in AM and one tab at noon ( dose increased _ 360 tablet 3    ibuprofen (ADVIL/MOTRIN) 800 MG tablet Take 1 tablet (800 mg) by mouth every 8 hours as needed for moderate pain 360 tablet 3    Lidocaine (LIDOCARE) 4 % Patch Place 1 patch onto the skin every 24 hours To prevent lidocaine toxicity, patient should be patch free for 12 hrs daily. 30 patch 4    nicotine (NICORETTE) 4 MG lozenge PLACE 1 LOZENGE(4 MG) BY MOUTH INSIDE CHEEK EVERY HOUR AS NEEDED FOR SMOKING CESSATION 144 lozenge 11    zolpidem (AMBIEN) 5 MG tablet Take 1 tablet (5 mg) by mouth nightly as needed for sleep 30 tablet 1    clobetasol propionate (TEMOVATE) 0.05 % external cream Apply topically 2 times daily. 60 g 0     No current facility-administered medications for this visit.         Allergies   Allergen Reactions    Diphenhydramine Other (See Comments)     Restless Legs/Feet  Other  reaction(s): Restless Legs/Feet  Restless legs  Other reaction(s): Restless Legs/Feet      Methadone Other (See Comments)     Had terrible itching and redness and was pulled off methadone    Mirtazapine Other (See Comments)     Restless Legs/Feet  Other reaction(s): Restless Legs/Feet  Restless legs.  Other reaction(s): Restless Legs/Feet      Seasonal Allergies     Trazodone Other (See Comments) and Unknown     Behavioral Disturbances  Other reaction(s): Behavioral Disturbances  Behavorial disturbance.  - restless legs         Additional MDM Details:  none    Rober Mauricio MD  [facility]  [phone]

## 2024-11-06 NOTE — PROGRESS NOTES
[facility] - Addiction Medicine       Rooming information:    Point of care urine drug screen positive for:  Lab Results   Component Value Date    BUP Screen Positive (A) 11/06/2024    BZO Negative 11/06/2024    BAR Negative 11/06/2024    DANE Negative 11/06/2024    MAMP Negative 11/06/2024    AMP Negative 11/06/2024    MDMA Negative 11/06/2024    MTD Negative 11/06/2024    YFV069 Negative 11/06/2024    OXY Negative 11/06/2024    PCP Negative 11/06/2024    THC Screen Positive (A) 11/06/2024    TEMP 94 F 11/06/2024    SGPOCT 1.025 11/06/2024       *POC urine drug screen does not screen for Fentanyl    PHQ-9 Scores:       3/20/2024     8:51 AM 7/16/2024     4:40 PM 8/29/2024     3:10 PM   PHQ   PHQ-9 Total Score 12 3 1   Q9: Thoughts of better off dead/self-harm past 2 weeks Not at all  Not at all  Not at all        Patient-reported     JULIETH-7 Scores:      1/28/2022    10:55 AM 3/12/2024     2:00 PM 7/16/2024     4:40 PM   JULIETH-7 SCORE   Total Score 0 (minimal anxiety) 1 (minimal anxiety) 2 (minimal anxiety)   Total Score 0 1 2       Any other recent substance use:     THC gummies   NICOTINE-Yes: Vaping and nicotine lozenges   If using nicotine, ready to quit? No    Side effects related to medications pt would like to discuss with provider (constipation, dry mouth, HA, GI upset, sedation?) No     Narcan currently available: Yes    Primary care provider: Charlee Hayes MD     Mental health provider: No (follow up on MH referral if needed)    Any housing, insurance deficits?: None    Contact information up to date? yes    3rd Party Involvement not today (please obtain JARET if pt would like to include)    Oracio Fisher MA  November 6, 2024  3:33 PM

## 2024-11-13 RX ORDER — BUPRENORPHINE 8 MG/1
TABLET SUBLINGUAL
Qty: 67 TABLET | Refills: 2 | Status: SHIPPED | OUTPATIENT
Start: 2024-11-13

## 2024-11-16 DIAGNOSIS — L30.8 OTHER ECZEMA: ICD-10-CM

## 2024-11-18 RX ORDER — CLOBETASOL PROPIONATE 0.5 MG/G
CREAM TOPICAL 2 TIMES DAILY
Qty: 60 G | Refills: 0 | Status: SHIPPED | OUTPATIENT
Start: 2024-11-18

## 2024-11-21 ENCOUNTER — VIRTUAL VISIT (OUTPATIENT)
Dept: FAMILY MEDICINE | Facility: CLINIC | Age: 44
End: 2024-11-21
Payer: COMMERCIAL

## 2024-11-21 DIAGNOSIS — F51.01 PRIMARY INSOMNIA: ICD-10-CM

## 2024-11-21 DIAGNOSIS — D32.0 BENIGN MENINGIOMA OF BRAIN (H): Primary | ICD-10-CM

## 2024-11-21 DIAGNOSIS — L73.0 ACNE NUCHAE KELOIDALIS: ICD-10-CM

## 2024-11-21 RX ORDER — ZOLPIDEM TARTRATE 5 MG/1
5 TABLET ORAL
Qty: 30 TABLET | Refills: 1 | Status: SHIPPED | OUTPATIENT
Start: 2024-11-21

## 2024-11-21 RX ORDER — DOXYCYCLINE 100 MG/1
100 CAPSULE ORAL 2 TIMES DAILY
Qty: 20 CAPSULE | Refills: 0 | Status: SHIPPED | OUTPATIENT
Start: 2024-11-21 | End: 2024-12-01

## 2024-11-21 NOTE — PROGRESS NOTES
James is a 44 year old who is being evaluated via a billable video visit.    How would you like to obtain your AVS? MyChart  If the video visit is dropped, the invitation should be resent by: Other e-mail:    Will anyone else be joining your video visit? No      Assessment & Plan     Benign meningioma of brain (H)  Patient last head CT scan from August of this year showed increased of the size of his meningioma up to 2.7 cm.  He reports for the past few months he has been noticing more pressure in his right eye, his right eye is more twitching.    Previously, he was referred to a neurosurgeon, he did not follow-up and never set up an appointment.  Discussed with the patient to call the neurosurgeon office and make an appointment    Primary insomnia  Use as needed  - zolpidem (AMBIEN) 5 MG tablet; Take 1 tablet (5 mg) by mouth nightly as needed for sleep.    Acne nuchae keloidalis  Continue to use topical Clobetasol as needed.  - doxycycline hyclate (VIBRAMYCIN) 100 MG capsule; Take 1 capsule (100 mg) by mouth 2 times daily for 10 days.      Subjective   James is a 44 year old, presenting for the following health issues:  No chief complaint on file.        11/21/2024     5:37 PM   Additional Questions   Roomed by Patient completed check in via [employer].     History of Present Illness       Reason for visit:  Set up appointment for eye specialist    He eats 0-1 servings of fruits and vegetables daily.He consumes 1 sweetened beverage(s) daily.He exercises with enough effort to increase his heart rate 10 to 19 minutes per day.  He exercises with enough effort to increase his heart rate 3 or less days per week.   He is taking medications regularly.     Discuss eye problem.         Review of Systems  Constitutional, HEENT, cardiovascular, pulmonary, GI, , musculoskeletal, neuro, skin, endocrine and psych systems are negative, except as otherwise noted.      Objective           Vitals:  No vitals were obtained today due to  virtual visit.    Physical Exam   GENERAL: alert and no distress  EYES: Eyes grossly normal to inspection.  No discharge or erythema, or obvious scleral/conjunctival abnormalities.  RESP: No audible wheeze, cough, or visible cyanosis.    SKIN: Visible skin clear. No significant rash, abnormal pigmentation or lesions.  NEURO: Cranial nerves grossly intact.  Mentation and speech appropriate for age.  PSYCH: Appropriate affect, tone, and pace of words    No orders of the defined types were placed in this encounter.         Video-Visit Details    Type of service:  Video Visit   Originating Location (pt. Location): Home    Distant Location (provider location):  On-site  Platform used for Video Visit: Jasmine  Signed Electronically by: Charlee Hayes MD

## 2024-12-05 ENCOUNTER — HOSPITAL ENCOUNTER (OUTPATIENT)
Dept: MRI IMAGING | Facility: CLINIC | Age: 44
Discharge: HOME | End: 2024-12-05
Attending: PHYSICIAN ASSISTANT
Payer: COMMERCIAL

## 2024-12-05 DIAGNOSIS — D32.9 MENINGIOMA (H): ICD-10-CM

## 2024-12-05 PROCEDURE — A9585 GADOBUTROL INJECTION: HCPCS | Performed by: PHYSICIAN ASSISTANT

## 2024-12-05 PROCEDURE — 70553 MRI BRAIN STEM W/O & W/DYE: CPT

## 2024-12-05 PROCEDURE — 255N000002 HC RX 255 OP 636: Performed by: PHYSICIAN ASSISTANT

## 2024-12-05 RX ORDER — GADOBUTROL 604.72 MG/ML
8 INJECTION INTRAVENOUS ONCE
Status: COMPLETED | OUTPATIENT
Start: 2024-12-05 | End: 2024-12-05

## 2024-12-05 RX ADMIN — GADOBUTROL 8 ML: 604.72 INJECTION INTRAVENOUS at 17:56

## 2024-12-18 ENCOUNTER — TELEPHONE (OUTPATIENT)
Dept: NEUROSURGERY | Facility: CLINIC | Age: 44
End: 2024-12-18

## 2024-12-18 NOTE — TELEPHONE ENCOUNTER
L/m for patient to schedule a new tumor neurosurg in person appointment w/ Dr. Chua. 4th attempt to schedule an appointment. Per Heidi Lopez via task. -KB

## 2025-01-27 NOTE — PROGRESS NOTES
.[facility]  Department of Neurosurgery    Name: Edgar Williamson  MRN: [id number]  : 1980  Referring provider: Charlee Hayes     Reason for visit: Enlarging right anterior clinoid process meningioma, new patient    Dear Dr. Hayes,    It was a pleasure to see Mr. Williamson in the [facility] today. I have reviewed the referral notes and imaging and have summarized our visit here. As you recall, Gael Williamson is a pleasant 44 year-old right handed gentleman with a PMH of substance use disorder (alcohol, opioids, benzodiazepines, methamphetamines, and heroin) with a likely incidentally discovered right anterior clinoid process meningioma. Briefly, the meningioma was identified when Mr. Williamson was admitted to [facility] for a seizure, and an MRI was ordered. The seizure was attributed to Wellbutrin, and the meningioma was an incidental finding. He has not had any more seizures and he is not on any anti-epileptics. He endorses new right blepharospasm, but denies any changes in vision, weakness or numbness, changes in energy level, or headache.    Review of Systems:   Pertinent items are noted in HPI or as in patient entered ROS below, remainder of complete ROS is negative.     Medications:   Buprenorphine, gabapentin    Allergies:   Diphenhydramine, methadone, mirtazapine, trazadone    Past Medical History:  Anxiety, alcohol use disorder, major depressive disorder, lumbar osteoarthritis, chronic pain    Past Surgical History:  Tooth extraction    Family History:     Social History:   He is a . He has a 15 year old son. From [place] originally. Sober for 5 years from polysubstance use.     Physical Exam:   General: No acute distress.   Head: No signs of trauma.    Eyes: Conjunctivae are normal.  Mouth/Throat: Oropharynx moist.  Neck: Normal range of motion.    Resp: No respiratory distress.   MSK: Moves all  extremities.  No obvious deformity.  Neuro: The patient is fully oriented and quite pleasant. Speech normal. Visual acuity was 20/20-1 on the right and 20/20-2 on the left, uncorrected. Visual fields are full, bilaterally. Extraocular movements are intact without nystagmus. Facial sensation is intact in V1, V2, V3 distributions. Facial nerve function is normal, rated as a House Brackmann 1, without synkinesis.  Palate is symmetric. Shoulders are full strength. Tongue is midline. There is no pronator drift. Full strength in all extremities. Sensation intact throughout. No dysmetria with finger-nose-finger testing. Gait has is normal-based and patient is able to tandem walk.  Psych: Normal mood and affect. Behavior is normal.      Imaging:  MRI from 12/5/24 reveals a homogenously enhancing mass centered over the right anterior clinoid process that is most consistent with a meningioma. It abuts the right optic nerve and chiasm without significant medial displacement. There is slight extension into the lateral aspect of the right optic canal. The lesion encases the right supraclinoid ICA, the right M1 segment of the MCA, and the right pcomm. It displaces the right A1 segment of the FLYNN medially. The lesion also invades the right cavernous sinus. At its maximal dimensions, the lesion is 31 mm x 28.3 mm x 34 mm (AP X TR x CC). This is increased from the maximal dimensions in 2016 which were 18 mm x 20 mm x 20 mm (AP X TR x CC), per report.     Assessment:  Enlarging right anterior clinoid process meningioma    Plan:  We reviewed the patient's history, imaging, natural history and expected outcomes of conservative management and intervention. Options include observation, surgical resction, and radiation. We discussed that since the lesion was has demonstrated significant growth over the last several years, we would recommend treatment to prevent likely inevitable symptomatic growth. Given his young age and the growing size  of the tumor, we would recommend surgical resection of the lesion with a right frontotemporal craniotomy rather than radiation.   We discussed the risks including bleeding, infection, neurologic injury, stroke, CSF leak, loss or changes in smell and taste, weakness, numbness, coma, death.  We discussed the hospital stay and aspects of the postoperative recovery period. He expressed some understandable frustration about the financial concerns regarding time off work and will need to think about this and plan accordingly before proceeding.  Should he wish to proceed, he would need preoperative anesthesia clearance, CT/CTA with fiducials on morning of surgery, and a preoperative Neuro-ophthalmology evaluation   We will reach out later today or tomorrow to give him the contact information for our financial team should he wish to seek assistance on the financial piece of this  I encouraged Mr. Williamson to contact with any questions or concerns or if we may be of assistance in any way.      It was a pleasure to participate in the care of your patient. Please feel free to contact me at any point if I can be of any assistance for Mr. Williamson.    Sincerely,  Bharat Chua MD       35 minutes spent on the date of the encounter doing chart review, review of outside records, review of test results, interpretation of tests, patient visit, documentation and discussion with other provider(s)

## 2025-01-29 ENCOUNTER — OFFICE VISIT (OUTPATIENT)
Dept: NEUROSURGERY | Facility: CLINIC | Age: 45
End: 2025-01-29
Attending: FAMILY MEDICINE
Payer: COMMERCIAL

## 2025-01-29 VITALS
DIASTOLIC BLOOD PRESSURE: 75 MMHG | WEIGHT: 199 LBS | BODY MASS INDEX: 26.95 KG/M2 | HEIGHT: 72 IN | HEART RATE: 58 BPM | SYSTOLIC BLOOD PRESSURE: 125 MMHG | OXYGEN SATURATION: 96 %

## 2025-01-29 DIAGNOSIS — D32.9 MENINGIOMA (H): Primary | ICD-10-CM

## 2025-01-29 DIAGNOSIS — D32.0 BENIGN MENINGIOMA OF BRAIN (H): ICD-10-CM

## 2025-01-29 PROCEDURE — 99203 OFFICE O/P NEW LOW 30 MIN: CPT | Performed by: NEUROLOGICAL SURGERY

## 2025-01-29 NOTE — LETTER
2025       RE: Edgar Williamson  [address]  [facility] [address]     Dear Colleague,    Thank you for referring your patient, Edgar Williamson, to the [facility] NEUROS[facility]GERY [facility]INIC [place] [facility]. Please see a copy of my visit note below.    .[facility]  Department of Neurosurgery    Name: Edgar Williamson  MRN: [id number]  : 1980  Referring provider: Charlee Hayes     Reason for visit: Enlarging right anterior clinoid process meningioma, new patient    Dear Dr. Hayes,    It was a pleasure to see Mr. Williamson in the [facility] today. I have reviewed the referral notes and imaging and have summarized our visit here. As you recall, Gael Williamson is a pleasant 44 year-old right handed gentleman with a PMH of substance use disorder (alcohol, opioids, benzodiazepines, methamphetamines, and heroin) with a likely incidentally discovered right anterior clinoid process meningioma. Briefly, the meningioma was identified when Mr. Williamson was admitted to [facility] for a seizure, and an MRI was ordered. The seizure was attributed to Wellbutrin, and the meningioma was an incidental finding. He has not had any more seizures and he is not on any anti-epileptics. He endorses new right blepharospasm, but denies any changes in vision, weakness or numbness, changes in energy level, or headache.    Review of Systems:   Pertinent items are noted in HPI or as in patient entered ROS below, remainder of complete ROS is negative.     Medications:   Buprenorphine, gabapentin    Allergies:   Diphenhydramine, methadone, mirtazapine, trazadone    Past Medical History:  Anxiety, alcohol use disorder, major depressive disorder, lumbar osteoarthritis, chronic pain    Past Surgical History:  Tooth extraction    Family History:     Social History:   He is a .  He has a 15 year old son. From [place] originally. Sober for 5 years from polysubstance use.     Physical Exam:   General: No acute distress.   Head: No signs of trauma.    Eyes: Conjunctivae are normal.  Mouth/Throat: Oropharynx moist.  Neck: Normal range of motion.    Resp: No respiratory distress.   MSK: Moves all extremities.  No obvious deformity.  Neuro: The patient is fully oriented and quite pleasant. Speech normal. Visual acuity was 20/20-1 on the right and 20/20-2 on the left, uncorrected. Visual fields are full, bilaterally. Extraocular movements are intact without nystagmus. Facial sensation is intact in V1, V2, V3 distributions. Facial nerve function is normal, rated as a House Brackmann 1, without synkinesis.  Palate is symmetric. Shoulders are full strength. Tongue is midline. There is no pronator drift. Full strength in all extremities. Sensation intact throughout. No dysmetria with finger-nose-finger testing. Gait has is normal-based and patient is able to tandem walk.  Psych: Normal mood and affect. Behavior is normal.      Imaging:  MRI from 12/5/24 reveals a homogenously enhancing mass centered over the right anterior clinoid process that is most consistent with a meningioma. It abuts the right optic nerve and chiasm without significant medial displacement. There is slight extension into the lateral aspect of the right optic canal. The lesion encases the right supraclinoid ICA, the right M1 segment of the MCA, and the right pcomm. It displaces the right A1 segment of the FLYNN medially. The lesion also invades the right cavernous sinus. At its maximal dimensions, the lesion is 31 mm x 28.3 mm x 34 mm (AP X TR x CC). This is increased from the maximal dimensions in 2016 which were 18 mm x 20 mm x 20 mm (AP X TR x CC), per report.     Assessment:  Enlarging right anterior clinoid process meningioma    Plan:  We reviewed the patient's history, imaging, natural history and expected outcomes of  conservative management and intervention. Options include observation, surgical resction, and radiation. We discussed that since the lesion was has demonstrated significant growth over the last several years, we would recommend treatment to prevent likely inevitable symptomatic growth. Given his young age and the growing size of the tumor, we would recommend surgical resection of the lesion with a right frontotemporal craniotomy rather than radiation.   We discussed the risks including bleeding, infection, neurologic injury, stroke, CSF leak, loss or changes in smell and taste, weakness, numbness, coma, death.  We discussed the hospital stay and aspects of the postoperative recovery period. He expressed some understandable frustration about the financial concerns regarding time off work and will need to think about this and plan accordingly before proceeding.  Should he wish to proceed, he would need preoperative anesthesia clearance, CT/CTA with fiducials on morning of surgery, and a preoperative Neuro-ophthalmology evaluation   We will reach out later today or tomorrow to give him the contact information for our financial team should he wish to seek assistance on the financial piece of this  I encouraged Mr. Williamson to contact with any questions or concerns or if we may be of assistance in any way.      It was a pleasure to participate in the care of your patient. Please feel free to contact me at any point if I can be of any assistance for Mr. Williamson.    Sincerely,  Bharat Chua MD       35 minutes spent on the date of the encounter doing chart review, review of outside records, review of test results, interpretation of tests, patient visit, documentation and discussion with other provider(s)         Again, thank you for allowing me to participate in the care of your patient.      Sincerely,    Bharat Chua MD

## 2025-01-29 NOTE — NURSING NOTE
Chief Complaint   Patient presents with    Consult     New tumor neurosurg     Donovan Echavarria

## 2025-01-29 NOTE — PATIENT INSTRUCTIONS
You were seen today with Dr. Bharat Chua.    Next steps:  Neuro-ophthalmology referral to see Dr. Baker or Louis  We will call you in about a week to check in.       How to Contact Us  Send a [employer] message to your provider.   Call the clinic - your call will be routed appropriately.   Neurosurgery Clinic: [phone]  To speak directly to an RN Care Coordinator:  Smiley RN: [phone]  Jaylin RN: [phone]    Note: We do our best to check voicemail frequently throughout the day and make every effort to return calls within 1-2 business days. For urgent matters, please use the general clinic phone numbers listed above.     Financial assistance: [phone]  [email or web address]

## 2025-01-29 NOTE — LETTER
[facility]  [facility] NEUROSURGERY CLINIC [address]  [address]RD FLO[address]  [facility] [address]  Phone: [phone]  Fax: [phone]          2025    RE:   Edgar Williamson  [address]  [place] [address]      Dear Colleague,    Thank you for referring your patient, Edgar Williamson, to the [facility]. Please see a copy of my visit note below.      .[facility]  Department of Neurosurgery    Name: Edgar Williamson  MRN: [id number]  : 1980  Referring provider: Charlee Hayes     Reason for visit: Enlarging right anterior clinoid process meningioma, new patient    Dear Dr. Hayes,    It was a pleasure to see Mr. Williamson in the [facility] today. I have reviewed the referral notes and imaging and have summarized our visit here. As you recall, Gael Williamson is a pleasant 44 year-old right handed gentleman with a PMH of substance use disorder (alcohol, opioids, benzodiazepines, methamphetamines, and heroin) with a likely incidentally discovered right anterior clinoid process meningioma. Briefly, the meningioma was identified when Mr. Williamson was admitted to [facility] for a seizure, and an MRI was ordered. The seizure was attributed to Wellbutrin, and the meningioma was an incidental finding. He has not had any more seizures and he is not on any anti-epileptics. He endorses new right blepharospasm, but denies any changes in vision, weakness or numbness, changes in energy level, or headache.    Review of Systems:   Pertinent items are noted in HPI or as in patient entered ROS below, remainder of complete ROS is negative.     Medications:   Buprenorphine, gabapentin    Allergies:   Diphenhydramine, methadone, mirtazapine, trazadone    Past Medical History:  Anxiety, alcohol use disorder, major depressive disorder,  lumbar osteoarthritis, chronic pain    Past Surgical History:  Tooth extraction    Family History:     Social History:   He is a . He has a 15 year old son. From [place] originally. Sober for 5 years from polysubstance use.     Physical Exam:   General: No acute distress.   Head: No signs of trauma.    Eyes: Conjunctivae are normal.  Mouth/Throat: Oropharynx moist.  Neck: Normal range of motion.    Resp: No respiratory distress.   MSK: Moves all extremities.  No obvious deformity.  Neuro: The patient is fully oriented and quite pleasant. Speech normal. Visual acuity was 20/20-1 on the right and 20/20-2 on the left, uncorrected. Visual fields are full, bilaterally. Extraocular movements are intact without nystagmus. Facial sensation is intact in V1, V2, V3 distributions. Facial nerve function is normal, rated as a House Brackmann 1, without synkinesis.  Palate is symmetric. Shoulders are full strength. Tongue is midline. There is no pronator drift. Full strength in all extremities. Sensation intact throughout. No dysmetria with finger-nose-finger testing. Gait has is normal-based and patient is able to tandem walk.  Psych: Normal mood and affect. Behavior is normal.      Imaging:  MRI from 12/5/24 reveals a homogenously enhancing mass centered over the right anterior clinoid process that is most consistent with a meningioma. It abuts the right optic nerve and chiasm without significant medial displacement. There is slight extension into the lateral aspect of the right optic canal. The lesion encases the right supraclinoid ICA, the right M1 segment of the MCA, and the right pcomm. It displaces the right A1 segment of the FLYNN medially. The lesion also invades the right cavernous sinus. At its maximal dimensions, the lesion is 31 mm x 28.3 mm x 34 mm (AP X TR x CC). This is increased from the maximal dimensions in 2016 which were 18 mm x 20 mm x 20 mm (AP X TR x CC), per report.     Assessment:  Enlarging  right anterior clinoid process meningioma    Plan:  We reviewed the patient's history, imaging, natural history and expected outcomes of conservative management and intervention. Options include observation, surgical resction, and radiation. We discussed that since the lesion was has demonstrated significant growth over the last several years, we would recommend treatment to prevent likely inevitable symptomatic growth. Given his young age and the growing size of the tumor, we would recommend surgical resection of the lesion with a right frontotemporal craniotomy rather than radiation.   We discussed the risks including bleeding, infection, neurologic injury, stroke, CSF leak, loss or changes in smell and taste, weakness, numbness, coma, death.  We discussed the hospital stay and aspects of the postoperative recovery period. He expressed some understandable frustration about the financial concerns regarding time off work and will need to think about this and plan accordingly before proceeding.  Should he wish to proceed, he would need preoperative anesthesia clearance, CT/CTA with fiducials on morning of surgery, and a preoperative Neuro-ophthalmology evaluation   We will reach out later today or tomorrow to give him the contact information for our financial team should he wish to seek assistance on the financial piece of this  I encouraged Mr. Williamson to contact with any questions or concerns or if we may be of assistance in any way.      It was a pleasure to participate in the care of your patient. Please feel free to contact me at any point if I can be of any assistance for Mr. Williamson.    Sincerely,  Bharat Chua MD       35 minutes spent on the date of the encounter doing chart review, review of outside records, review of test results, interpretation of tests, patient visit, documentation and discussion with other provider(s)         Again, thank you for allowing me to participate in the care of your  patient.      Sincerely,    Bharat Chua MD

## 2025-02-25 ENCOUNTER — MYC MEDICAL ADVICE (OUTPATIENT)
Dept: ADDICTION MEDICINE | Facility: CLINIC | Age: 45
End: 2025-02-25
Payer: COMMERCIAL

## 2025-02-25 DIAGNOSIS — F11.20 OPIOID USE DISORDER, SEVERE, DEPENDENCE (H): ICD-10-CM

## 2025-03-06 RX ORDER — BUPRENORPHINE 8 MG/1
TABLET SUBLINGUAL
Qty: 67 TABLET | Refills: 0 | Status: SHIPPED | OUTPATIENT
Start: 2025-03-06 | End: 2025-03-06

## 2025-03-06 RX ORDER — BUPRENORPHINE 8 MG/1
TABLET SUBLINGUAL
Qty: 67 TABLET | Refills: 2 | Status: SHIPPED | OUTPATIENT
Start: 2025-03-06

## 2025-05-14 ENCOUNTER — TELEPHONE (OUTPATIENT)
Dept: FAMILY MEDICINE | Facility: CLINIC | Age: 45
End: 2025-05-14
Payer: COMMERCIAL

## 2025-05-14 NOTE — TELEPHONE ENCOUNTER
Patient Quality Outreach    Patient is due for the following:   Physical Preventive Adult Physical      Topic Date Due    Hepatitis B Vaccine (3 of 3 - 19+ 3-dose series) 09/12/2013    Hepatitis A Vaccine (2 of 2 - Risk 2-dose series) 02/02/2020    COVID-19 Vaccine (4 - 2024-25 season) 09/01/2024     Chronic Opioid Use -  Treatment Agreement (CSA), Urine Drug Screen, JULIETH-7, and PHQ-9    Action(s) Taken:   Schedule a Adult Preventative    Type of outreach:    Sent [employer] message.    Questions for provider review:    None         Destiny Enamorado CMA  Chart routed to Care Team.

## 2025-06-07 ENCOUNTER — MYC REFILL (OUTPATIENT)
Dept: ADDICTION MEDICINE | Facility: CLINIC | Age: 45
End: 2025-06-07
Payer: COMMERCIAL

## 2025-06-07 ENCOUNTER — MYC REFILL (OUTPATIENT)
Dept: FAMILY MEDICINE | Facility: CLINIC | Age: 45
End: 2025-06-07
Payer: COMMERCIAL

## 2025-06-07 DIAGNOSIS — F11.20 OPIOID USE DISORDER, SEVERE, DEPENDENCE (H): ICD-10-CM

## 2025-06-07 DIAGNOSIS — F51.01 PRIMARY INSOMNIA: ICD-10-CM

## 2025-06-09 RX ORDER — BUPRENORPHINE 8 MG/1
TABLET SUBLINGUAL
Qty: 67 TABLET | Refills: 1 | Status: SHIPPED | OUTPATIENT
Start: 2025-06-09

## 2025-06-09 RX ORDER — ZOLPIDEM TARTRATE 5 MG/1
5 TABLET ORAL
Qty: 30 TABLET | Refills: 0 | Status: SHIPPED | OUTPATIENT
Start: 2025-06-09

## 2025-06-09 NOTE — TELEPHONE ENCOUNTER
Date of Last Office Visit: 11/6/2024  Date of Next Office Visit: None; routing for A to assist pt with scheduling.    No shows since last visit: No  More than one patient-initiated cancellation (with reschedule) since last seen in clinic? No    []Medication refilled per  Medication Refill in Ambulatory Care  policy.  []Scope of Practice: refill request processed by LPN/MA  [x]Medication unable to be refilled by RN due to criteria not met as indicated below:    [x]Eligibility: has not had a provider visit within last 6 months   [x]Supervision: no future appointment; < 7 days before next appointment   []Compliance: no shows; cancellations; lapse in therapy   []Verification: order discrepancy; may need modification...   [] > 30-day supply request   []Advanced refill request: > 7 days before refill date   [x]Controlled medication   []Medication not included in policy   []Review: new med; med adjusted <= 30 days; safety alert; requires lab monitoring...   []Other:      Medication(s) requested:     -  Subutex 8 mg SL tab  Date last ordered: 3/6/2025  Qty: 67  Refills: 2  Appropriate for refill? Provider to review.        Any Controlled Substance(s)? Yes   [place]  checked? N/A      Requested medication(s) verified as identical to current order? No: Pending 1 month Rx    Any lapse in adherence to medication(s) greater than 5 days? No      Additional action taken? routed encounter to provider for review. [employer] message sent to patient requesting he call [phone] to schedule urgent appt once he has insurance.      Last visit treatment plan:   ASSESSMENT/PLAN  Diagnoses and all orders for this visit:  Opioid use disorder, severe, dependence (H)  -     Drugs of Abuse Screen Urine (POC CUPS) POCT  Major depressive disorder in full remission, unspecified whether recurrent (H)  Tobacco dependence     No orders of the defined types were placed in this encounter.              Nov 6, 2024  - Remains stable on current dose of  Subutex, no changes today  - Pain stable, no significant changes. Using PRN dose rarely  - Mood stable. Finding it easier to let things go, which is freeing  - Tobacco-free but continues to use his vape and nicotine lozenges, not wanting to stop vaping at this time  - Consistent use of cannabis but no concerns for consequences of use at this time  - Continues to attend meetings regularly, finding good support from his sober support network        RTC  Return in about 3 months (around 2/6/2025) for in person.    Any medication(s) require lab monitoring? Yes   ADDICTION MED   Last POC UDS Results:   Lab Results   Component Value Date    BUP Screen Positive (A) 11/06/2024    BZO Negative 11/06/2024    BAR Negative 11/06/2024    DANE Negative 11/06/2024    MAMP Negative 11/06/2024    AMP Negative 11/06/2024    MDMA Negative 11/06/2024    MTD Negative 11/06/2024    XKC858 Negative 11/06/2024    OXY Negative 11/06/2024    PCP Negative 11/06/2024    THC Screen Positive (A) 11/06/2024    TEMP 94 F 11/06/2024    SGPOCT 1.025 11/06/2024         Last Drug Confirmation Results:   Creatinine Urine mg/dL   Date Value Ref Range Status   10/08/2021 225 mg/dL Final     Comment:     The reference range has not been established for creatinine in random urines. The results should be integrated into the clinical context for interpretation.     Gabapentin (Neurontin)   Date Value Ref Range Status   10/08/2021 Present (A) Absent Final     Comment:     Sources of gabapentin are prescription medications.       Last Drugs of Abuse Screening:   Cannabinoids (91-[id number]-9-carboxy-9-THC)   Date Value Ref Range Status   11/30/2022 Detected (A) Not Detected, Indeterminate Final     Comment:     Cutoff for a positive cannabinoid is greater than 50 ng/ml.  This is an unconfirmed screening result to be used for medical purposes only.      Phencyclidine   Date Value Ref Range Status   11/30/2022 Not Detected Not Detected, Indeterminate Final     Comment:      Cutoff for a negative PCP is 25 ng/mL or less.     Cocaine (Benzoylecgonine)   Date Value Ref Range Status   11/30/2022 Not Detected Not Detected, Indeterminate Final     Comment:     Cutoff for a negative cocaine is 150 ng/ml or less.     Methamphetamine (d-Methamphetamine)   Date Value Ref Range Status   11/30/2022 Not Detected Not Detected, Indeterminate Final     Comment:     Cutoff for a negative methamphetamine is 500 ng/ml or less.     Opiates (Morphine)   Date Value Ref Range Status   11/30/2022 Not Detected Not Detected, Indeterminate Final     Comment:     Cutoff for a negative opiate is 100 ng/ml or less.     Amphetamine (d-Amphetamine)   Date Value Ref Range Status   11/30/2022 Not Detected Not Detected, Indeterminate Final     Comment:     Cutoff for a negative amphetamine is 500 ng/mL or less.     Benzodiazepines (Nordiazepam)   Date Value Ref Range Status   11/30/2022 Not Detected Not Detected, Indeterminate Final     Comment:     Cutoff for a negative benzodiazepine is 150 ng/ml or less.     Tricyclic Antidepressants (Desipramine)   Date Value Ref Range Status   11/30/2022 Not Detected Not Detected, Indeterminate Final     Comment:     Cutoff for a negative tricyclic antidepressant is 300 ng/ml or less.     Methadone   Date Value Ref Range Status   11/30/2022 Not Detected Not Detected, Indeterminate Final     Comment:     Cutoff for a negative methadone is 200 ng/ml or less.     Barbiturates (Butalbital)   Date Value Ref Range Status   11/30/2022 Not Detected Not Detected, Indeterminate Final     Comment:     Cutoff for a negative barbituate is 200 ng/ml or less.     Oxycodone   Date Value Ref Range Status   11/30/2022 Not Detected Not Detected, Indeterminate Final     Comment:     Cutoff for a negative oxycodone is 100 ng/mL or less.     Propoxyphene (Norpropoxyphene)   Date Value Ref Range Status   11/30/2022 Not Detected Not Detected, Indeterminate Final     Comment:     Cutoff for a negative  "propoxyphene is 300 ng/ml or less.     Buprenorphine   Date Value Ref Range Status   11/30/2022 Detected (A) Not Detected, Indeterminate Final     Comment:     Cutoff for a positive buprenorphine is greater than 10 ng/ml.  This is an unconfirmed screening result to be used for medical purposes only.         Last Fentanyl Qualitative Urine: No results found for: \"UFENT\"     Last Ethyl Alcohol Level: No results found for: \"ETOH\"    Digna Lackey RN on 6/9/2025 at 12:46 PM   "

## 2025-06-09 NOTE — CONFIDENTIAL NOTE
Recurrent Rx refills for subutex, 8mg tablets, which have been a stable part of his management plan for many years without return to use. Will continue to provide this continuity until he can see me after his insurance restarts on July 15. After this date, will ensure he is seen for an appt prior to any additional refills.    Rober Mauricio MD

## 2025-06-15 ENCOUNTER — HEALTH MAINTENANCE LETTER (OUTPATIENT)
Age: 45
End: 2025-06-15

## 2025-07-08 DIAGNOSIS — M47.816 PRIMARY OSTEOARTHRITIS OF LUMBAR SPINE: ICD-10-CM

## 2025-07-09 RX ORDER — GABAPENTIN 600 MG/1
TABLET ORAL
Qty: 360 TABLET | Refills: 3 | Status: SHIPPED | OUTPATIENT
Start: 2025-07-09

## 2025-08-11 ENCOUNTER — TELEPHONE (OUTPATIENT)
Dept: FAMILY MEDICINE | Facility: CLINIC | Age: 45
End: 2025-08-11
Payer: COMMERCIAL